# Patient Record
Sex: MALE | Race: WHITE | NOT HISPANIC OR LATINO | Employment: OTHER | URBAN - METROPOLITAN AREA
[De-identification: names, ages, dates, MRNs, and addresses within clinical notes are randomized per-mention and may not be internally consistent; named-entity substitution may affect disease eponyms.]

---

## 2020-08-07 ENCOUNTER — LAB REQUISITION (OUTPATIENT)
Dept: LAB | Facility: HOSPITAL | Age: 70
End: 2020-08-07

## 2020-08-07 DIAGNOSIS — Z00.00 ENCOUNTER FOR GENERAL ADULT MEDICAL EXAMINATION WITHOUT ABNORMAL FINDINGS: ICD-10-CM

## 2020-08-07 LAB
ANION GAP SERPL CALCULATED.3IONS-SCNC: 12 MMOL/L (ref 5–15)
BUN SERPL-MCNC: 10 MG/DL (ref 8–23)
BUN SERPL-MCNC: NORMAL MG/DL
BUN/CREAT SERPL: NORMAL
CALCIUM SPEC-SCNC: 8.7 MG/DL (ref 8.6–10.5)
CHLORIDE SERPL-SCNC: 103 MMOL/L (ref 98–107)
CO2 SERPL-SCNC: 23 MMOL/L (ref 22–29)
CREAT SERPL-MCNC: 0.95 MG/DL (ref 0.76–1.27)
DEPRECATED RDW RBC AUTO: 45.1 FL (ref 37–54)
ERYTHROCYTE [DISTWIDTH] IN BLOOD BY AUTOMATED COUNT: 14.2 % (ref 12.3–15.4)
GFR SERPL CREATININE-BSD FRML MDRD: 78 ML/MIN/1.73
GLUCOSE SERPL-MCNC: 86 MG/DL (ref 65–99)
HCT VFR BLD AUTO: 35 % (ref 37.5–51)
HGB BLD-MCNC: 11.6 G/DL (ref 13–17.7)
MAGNESIUM SERPL-MCNC: 1.9 MG/DL (ref 1.6–2.4)
MCH RBC QN AUTO: 30.5 PG (ref 26.6–33)
MCHC RBC AUTO-ENTMCNC: 33.1 G/DL (ref 31.5–35.7)
MCV RBC AUTO: 92 FL (ref 79–97)
PHOSPHATE SERPL-MCNC: 3 MG/DL (ref 2.5–4.5)
PLATELET # BLD AUTO: 243 10*3/MM3 (ref 140–450)
PMV BLD AUTO: 9.6 FL (ref 6–12)
POTASSIUM SERPL-SCNC: 3.7 MMOL/L (ref 3.5–5.2)
RBC # BLD AUTO: 3.8 10*6/MM3 (ref 4.14–5.8)
SODIUM SERPL-SCNC: 138 MMOL/L (ref 136–145)
WBC # BLD AUTO: 6.7 10*3/MM3 (ref 3.4–10.8)

## 2020-08-07 PROCEDURE — 83735 ASSAY OF MAGNESIUM: CPT

## 2020-08-07 PROCEDURE — 84100 ASSAY OF PHOSPHORUS: CPT

## 2020-08-07 PROCEDURE — 80048 BASIC METABOLIC PNL TOTAL CA: CPT

## 2020-08-07 PROCEDURE — 85027 COMPLETE CBC AUTOMATED: CPT

## 2020-08-10 ENCOUNTER — LAB REQUISITION (OUTPATIENT)
Dept: LAB | Facility: HOSPITAL | Age: 70
End: 2020-08-10

## 2020-08-10 DIAGNOSIS — Z00.00 ENCOUNTER FOR GENERAL ADULT MEDICAL EXAMINATION WITHOUT ABNORMAL FINDINGS: ICD-10-CM

## 2020-08-10 LAB
ANION GAP SERPL CALCULATED.3IONS-SCNC: 10 MMOL/L (ref 5–15)
BUN SERPL-MCNC: 10 MG/DL (ref 8–23)
BUN SERPL-MCNC: NORMAL MG/DL
BUN/CREAT SERPL: NORMAL
CALCIUM SPEC-SCNC: 9.5 MG/DL (ref 8.6–10.5)
CHLORIDE SERPL-SCNC: 103 MMOL/L (ref 98–107)
CK SERPL-CCNC: 91 U/L (ref 20–200)
CO2 SERPL-SCNC: 25 MMOL/L (ref 22–29)
CREAT SERPL-MCNC: 1.04 MG/DL (ref 0.76–1.27)
DEPRECATED RDW RBC AUTO: 46.4 FL (ref 37–54)
ERYTHROCYTE [DISTWIDTH] IN BLOOD BY AUTOMATED COUNT: 14.4 % (ref 12.3–15.4)
GFR SERPL CREATININE-BSD FRML MDRD: 70 ML/MIN/1.73
GLUCOSE SERPL-MCNC: 81 MG/DL (ref 65–99)
HCT VFR BLD AUTO: 33.8 % (ref 37.5–51)
HGB BLD-MCNC: 11.4 G/DL (ref 13–17.7)
MCH RBC QN AUTO: 30.7 PG (ref 26.6–33)
MCHC RBC AUTO-ENTMCNC: 33.6 G/DL (ref 31.5–35.7)
MCV RBC AUTO: 91.5 FL (ref 79–97)
PLATELET # BLD AUTO: 257 10*3/MM3 (ref 140–450)
PMV BLD AUTO: 9.4 FL (ref 6–12)
POTASSIUM SERPL-SCNC: 4.3 MMOL/L (ref 3.5–5.2)
RBC # BLD AUTO: 3.7 10*6/MM3 (ref 4.14–5.8)
SODIUM SERPL-SCNC: 138 MMOL/L (ref 136–145)
WBC # BLD AUTO: 7.1 10*3/MM3 (ref 3.4–10.8)

## 2020-08-10 PROCEDURE — 80048 BASIC METABOLIC PNL TOTAL CA: CPT | Performed by: PHYSICAL MEDICINE & REHABILITATION

## 2020-08-10 PROCEDURE — 85027 COMPLETE CBC AUTOMATED: CPT | Performed by: PHYSICAL MEDICINE & REHABILITATION

## 2020-08-10 PROCEDURE — 82550 ASSAY OF CK (CPK): CPT | Performed by: PHYSICAL MEDICINE & REHABILITATION

## 2020-08-14 ENCOUNTER — LAB REQUISITION (OUTPATIENT)
Dept: LAB | Facility: HOSPITAL | Age: 70
End: 2020-08-14

## 2020-08-14 ENCOUNTER — HOSPITAL ENCOUNTER (OUTPATIENT)
Dept: GENERAL RADIOLOGY | Facility: HOSPITAL | Age: 70
Discharge: HOME | End: 2020-08-14

## 2020-08-14 DIAGNOSIS — Z00.00 ENCOUNTER FOR GENERAL ADULT MEDICAL EXAMINATION WITHOUT ABNORMAL FINDINGS: ICD-10-CM

## 2020-08-14 DIAGNOSIS — U07.1 COVID-19 VIRUS INFECTION: ICD-10-CM

## 2020-08-14 DIAGNOSIS — M62.82 RHABDOMYOLYSIS: ICD-10-CM

## 2020-08-14 LAB
ANION GAP SERPL CALCULATED.3IONS-SCNC: 12 MMOL/L (ref 5–15)
BACTERIA UR QL AUTO: ABNORMAL /HPF
BASOPHILS # BLD AUTO: 0.1 10*3/MM3 (ref 0–0.2)
BASOPHILS NFR BLD AUTO: 0.7 % (ref 0–1.5)
BILIRUB UR QL STRIP: NEGATIVE
BUN SERPL-MCNC: 13 MG/DL (ref 8–23)
BUN SERPL-MCNC: ABNORMAL MG/DL
BUN/CREAT SERPL: ABNORMAL
CALCIUM SPEC-SCNC: 9.3 MG/DL (ref 8.6–10.5)
CHLORIDE SERPL-SCNC: 101 MMOL/L (ref 98–107)
CLARITY UR: CLEAR
CO2 SERPL-SCNC: 21 MMOL/L (ref 22–29)
COLOR UR: YELLOW
CREAT SERPL-MCNC: 1.27 MG/DL (ref 0.76–1.27)
DEPRECATED RDW RBC AUTO: 46.4 FL (ref 37–54)
EOSINOPHIL # BLD AUTO: 0 10*3/MM3 (ref 0–0.4)
EOSINOPHIL NFR BLD AUTO: 0.2 % (ref 0.3–6.2)
ERYTHROCYTE [DISTWIDTH] IN BLOOD BY AUTOMATED COUNT: 14.6 % (ref 12.3–15.4)
GFR SERPL CREATININE-BSD FRML MDRD: 56 ML/MIN/1.73
GLUCOSE SERPL-MCNC: 83 MG/DL (ref 65–99)
GLUCOSE UR STRIP-MCNC: NEGATIVE MG/DL
HCT VFR BLD AUTO: 31.9 % (ref 37.5–51)
HGB BLD-MCNC: 10.9 G/DL (ref 13–17.7)
HGB UR QL STRIP.AUTO: ABNORMAL
HYALINE CASTS UR QL AUTO: ABNORMAL /LPF
KETONES UR QL STRIP: ABNORMAL
LEUKOCYTE ESTERASE UR QL STRIP.AUTO: ABNORMAL
LYMPHOCYTES # BLD AUTO: 2 10*3/MM3 (ref 0.7–3.1)
LYMPHOCYTES NFR BLD AUTO: 23.2 % (ref 19.6–45.3)
MCH RBC QN AUTO: 31.3 PG (ref 26.6–33)
MCHC RBC AUTO-ENTMCNC: 34.2 G/DL (ref 31.5–35.7)
MCV RBC AUTO: 91.7 FL (ref 79–97)
MONOCYTES # BLD AUTO: 0.9 10*3/MM3 (ref 0.1–0.9)
MONOCYTES NFR BLD AUTO: 10.6 % (ref 5–12)
NEUTROPHILS NFR BLD AUTO: 5.7 10*3/MM3 (ref 1.7–7)
NEUTROPHILS NFR BLD AUTO: 65.3 % (ref 42.7–76)
NITRITE UR QL STRIP: POSITIVE
NRBC BLD AUTO-RTO: 0.1 /100 WBC (ref 0–0.2)
PH UR STRIP.AUTO: 5.5 [PH] (ref 5–8)
PLATELET # BLD AUTO: 256 10*3/MM3 (ref 140–450)
PMV BLD AUTO: 9.6 FL (ref 6–12)
POTASSIUM SERPL-SCNC: 3.9 MMOL/L (ref 3.5–5.2)
PROT UR QL STRIP: ABNORMAL
RBC # BLD AUTO: 3.48 10*6/MM3 (ref 4.14–5.8)
RBC # UR: ABNORMAL /HPF
REF LAB TEST METHOD: ABNORMAL
SARS-COV-2 RNA PNL SPEC NAA+PROBE: DETECTED
SODIUM SERPL-SCNC: 134 MMOL/L (ref 136–145)
SP GR UR STRIP: 1.02 (ref 1–1.03)
SQUAMOUS #/AREA URNS HPF: ABNORMAL /HPF
UROBILINOGEN UR QL STRIP: ABNORMAL
WBC # BLD AUTO: 8.7 10*3/MM3 (ref 3.4–10.8)
WBC UR QL AUTO: ABNORMAL /HPF

## 2020-08-14 PROCEDURE — 85025 COMPLETE CBC W/AUTO DIFF WBC: CPT | Performed by: PHYSICAL MEDICINE & REHABILITATION

## 2020-08-14 PROCEDURE — 80048 BASIC METABOLIC PNL TOTAL CA: CPT | Performed by: PHYSICAL MEDICINE & REHABILITATION

## 2020-08-14 PROCEDURE — 71046 X-RAY EXAM CHEST 2 VIEWS: CPT

## 2020-08-14 PROCEDURE — 87635 SARS-COV-2 COVID-19 AMP PRB: CPT | Performed by: PHYSICAL MEDICINE & REHABILITATION

## 2020-08-14 PROCEDURE — 87086 URINE CULTURE/COLONY COUNT: CPT | Performed by: PHYSICAL MEDICINE & REHABILITATION

## 2020-08-14 PROCEDURE — 87147 CULTURE TYPE IMMUNOLOGIC: CPT | Performed by: PHYSICAL MEDICINE & REHABILITATION

## 2020-08-14 PROCEDURE — 81001 URINALYSIS AUTO W/SCOPE: CPT | Performed by: PHYSICAL MEDICINE & REHABILITATION

## 2020-08-14 PROCEDURE — 87186 SC STD MICRODIL/AGAR DIL: CPT | Performed by: PHYSICAL MEDICINE & REHABILITATION

## 2020-08-14 PROCEDURE — U0004 COV-19 TEST NON-CDC HGH THRU: HCPCS | Performed by: PHYSICAL MEDICINE & REHABILITATION

## 2020-08-14 PROCEDURE — U0002 COVID-19 LAB TEST NON-CDC: HCPCS | Performed by: PHYSICAL MEDICINE & REHABILITATION

## 2020-08-15 ENCOUNTER — LAB REQUISITION (OUTPATIENT)
Dept: LAB | Facility: HOSPITAL | Age: 70
End: 2020-08-15

## 2020-08-15 DIAGNOSIS — M62.82 RHABDOMYOLYSIS: ICD-10-CM

## 2020-08-15 DIAGNOSIS — Z00.00 ENCOUNTER FOR GENERAL ADULT MEDICAL EXAMINATION WITHOUT ABNORMAL FINDINGS: ICD-10-CM

## 2020-08-15 DIAGNOSIS — I69.354 HEMIPLEGIA AND HEMIPARESIS FOLLOWING CEREBRAL INFARCTION AFFECTING LEFT NON-DOMINANT SIDE (HCC): ICD-10-CM

## 2020-08-15 DIAGNOSIS — E78.5 HYPERLIPIDEMIA, UNSPECIFIED: ICD-10-CM

## 2020-08-15 DIAGNOSIS — I10 ESSENTIAL (PRIMARY) HYPERTENSION: ICD-10-CM

## 2020-08-15 LAB
ANION GAP SERPL CALCULATED.3IONS-SCNC: 13 MMOL/L (ref 5–15)
BUN SERPL-MCNC: 13 MG/DL (ref 8–23)
BUN SERPL-MCNC: ABNORMAL MG/DL
BUN/CREAT SERPL: ABNORMAL
CALCIUM SPEC-SCNC: 9.2 MG/DL (ref 8.6–10.5)
CHLORIDE SERPL-SCNC: 104 MMOL/L (ref 98–107)
CO2 SERPL-SCNC: 21 MMOL/L (ref 22–29)
CREAT SERPL-MCNC: 1.06 MG/DL (ref 0.76–1.27)
DEPRECATED RDW RBC AUTO: 47.7 FL (ref 37–54)
ERYTHROCYTE [DISTWIDTH] IN BLOOD BY AUTOMATED COUNT: 14.7 % (ref 12.3–15.4)
GFR SERPL CREATININE-BSD FRML MDRD: 69 ML/MIN/1.73
GLUCOSE SERPL-MCNC: 83 MG/DL (ref 65–99)
HCT VFR BLD AUTO: 33.3 % (ref 37.5–51)
HGB BLD-MCNC: 11.1 G/DL (ref 13–17.7)
MCH RBC QN AUTO: 30.8 PG (ref 26.6–33)
MCHC RBC AUTO-ENTMCNC: 33.4 G/DL (ref 31.5–35.7)
MCV RBC AUTO: 92.1 FL (ref 79–97)
PLATELET # BLD AUTO: 261 10*3/MM3 (ref 140–450)
PMV BLD AUTO: 9.3 FL (ref 6–12)
POTASSIUM SERPL-SCNC: 4.2 MMOL/L (ref 3.5–5.2)
RBC # BLD AUTO: 3.61 10*6/MM3 (ref 4.14–5.8)
REF LAB TEST METHOD: NORMAL
SARS-COV-2 RNA RESP QL NAA+PROBE: NOT DETECTED
SODIUM SERPL-SCNC: 138 MMOL/L (ref 136–145)
WBC # BLD AUTO: 5.9 10*3/MM3 (ref 3.4–10.8)

## 2020-08-15 PROCEDURE — U0003 INFECTIOUS AGENT DETECTION BY NUCLEIC ACID (DNA OR RNA); SEVERE ACUTE RESPIRATORY SYNDROME CORONAVIRUS 2 (SARS-COV-2) (CORONAVIRUS DISEASE [COVID-19]), AMPLIFIED PROBE TECHNIQUE, MAKING USE OF HIGH THROUGHPUT TECHNOLOGIES AS DESCRIBED BY CMS-2020-01-R: HCPCS | Performed by: PHYSICAL MEDICINE & REHABILITATION

## 2020-08-15 PROCEDURE — 85027 COMPLETE CBC AUTOMATED: CPT | Performed by: PHYSICAL MEDICINE & REHABILITATION

## 2020-08-15 PROCEDURE — 80048 BASIC METABOLIC PNL TOTAL CA: CPT | Performed by: PHYSICAL MEDICINE & REHABILITATION

## 2020-08-17 ENCOUNTER — LAB REQUISITION (OUTPATIENT)
Dept: LAB | Facility: HOSPITAL | Age: 70
End: 2020-08-17

## 2020-08-17 DIAGNOSIS — Z00.00 ENCOUNTER FOR GENERAL ADULT MEDICAL EXAMINATION WITHOUT ABNORMAL FINDINGS: ICD-10-CM

## 2020-08-17 LAB
ANION GAP SERPL CALCULATED.3IONS-SCNC: 12 MMOL/L (ref 5–15)
BACTERIA SPEC AEROBE CULT: ABNORMAL
BUN SERPL-MCNC: 11 MG/DL (ref 8–23)
BUN SERPL-MCNC: NORMAL MG/DL
BUN/CREAT SERPL: NORMAL
CALCIUM SPEC-SCNC: 9.8 MG/DL (ref 8.6–10.5)
CHLORIDE SERPL-SCNC: 101 MMOL/L (ref 98–107)
CO2 SERPL-SCNC: 23 MMOL/L (ref 22–29)
CREAT SERPL-MCNC: 0.99 MG/DL (ref 0.76–1.27)
DEPRECATED RDW RBC AUTO: 46.8 FL (ref 37–54)
ERYTHROCYTE [DISTWIDTH] IN BLOOD BY AUTOMATED COUNT: 14.5 % (ref 12.3–15.4)
GFR SERPL CREATININE-BSD FRML MDRD: 75 ML/MIN/1.73
GLUCOSE SERPL-MCNC: 76 MG/DL (ref 65–99)
HCT VFR BLD AUTO: 37.4 % (ref 37.5–51)
HGB BLD-MCNC: 12.6 G/DL (ref 13–17.7)
MCH RBC QN AUTO: 31.5 PG (ref 26.6–33)
MCHC RBC AUTO-ENTMCNC: 33.8 G/DL (ref 31.5–35.7)
MCV RBC AUTO: 93.1 FL (ref 79–97)
PLATELET # BLD AUTO: 311 10*3/MM3 (ref 140–450)
PMV BLD AUTO: 9.7 FL (ref 6–12)
POTASSIUM SERPL-SCNC: 4.5 MMOL/L (ref 3.5–5.2)
RBC # BLD AUTO: 4.02 10*6/MM3 (ref 4.14–5.8)
SODIUM SERPL-SCNC: 136 MMOL/L (ref 136–145)
WBC # BLD AUTO: 4.8 10*3/MM3 (ref 3.4–10.8)

## 2020-08-17 PROCEDURE — 85027 COMPLETE CBC AUTOMATED: CPT | Performed by: PHYSICAL MEDICINE & REHABILITATION

## 2020-08-17 PROCEDURE — 80048 BASIC METABOLIC PNL TOTAL CA: CPT | Performed by: PHYSICAL MEDICINE & REHABILITATION

## 2020-08-18 LAB — SARS-COV-2 RNA RESP QL NAA+PROBE: NOT DETECTED

## 2020-08-21 ENCOUNTER — LAB REQUISITION (OUTPATIENT)
Dept: LAB | Facility: HOSPITAL | Age: 70
End: 2020-08-21

## 2020-08-21 DIAGNOSIS — Z00.00 ENCOUNTER FOR GENERAL ADULT MEDICAL EXAMINATION WITHOUT ABNORMAL FINDINGS: ICD-10-CM

## 2020-08-21 LAB
ANION GAP SERPL CALCULATED.3IONS-SCNC: 11 MMOL/L (ref 5–15)
BASOPHILS # BLD AUTO: 0 10*3/MM3 (ref 0–0.2)
BASOPHILS NFR BLD AUTO: 0.3 % (ref 0–1.5)
BUN SERPL-MCNC: 23 MG/DL (ref 8–23)
BUN SERPL-MCNC: ABNORMAL MG/DL
BUN/CREAT SERPL: ABNORMAL
CALCIUM SPEC-SCNC: 9.2 MG/DL (ref 8.6–10.5)
CHLORIDE SERPL-SCNC: 104 MMOL/L (ref 98–107)
CO2 SERPL-SCNC: 21 MMOL/L (ref 22–29)
CREAT SERPL-MCNC: 1.23 MG/DL (ref 0.76–1.27)
DEPRECATED RDW RBC AUTO: 46.4 FL (ref 37–54)
EOSINOPHIL # BLD AUTO: 0 10*3/MM3 (ref 0–0.4)
EOSINOPHIL NFR BLD AUTO: 0.3 % (ref 0.3–6.2)
ERYTHROCYTE [DISTWIDTH] IN BLOOD BY AUTOMATED COUNT: 14.5 % (ref 12.3–15.4)
GFR SERPL CREATININE-BSD FRML MDRD: 58 ML/MIN/1.73
GLUCOSE SERPL-MCNC: 87 MG/DL (ref 65–99)
HCT VFR BLD AUTO: 33.5 % (ref 37.5–51)
HGB BLD-MCNC: 11.3 G/DL (ref 13–17.7)
LYMPHOCYTES # BLD AUTO: 1.4 10*3/MM3 (ref 0.7–3.1)
LYMPHOCYTES NFR BLD AUTO: 30 % (ref 19.6–45.3)
MCH RBC QN AUTO: 31 PG (ref 26.6–33)
MCHC RBC AUTO-ENTMCNC: 33.7 G/DL (ref 31.5–35.7)
MCV RBC AUTO: 91.7 FL (ref 79–97)
MONOCYTES # BLD AUTO: 0.6 10*3/MM3 (ref 0.1–0.9)
MONOCYTES NFR BLD AUTO: 12.7 % (ref 5–12)
NEUTROPHILS NFR BLD AUTO: 2.6 10*3/MM3 (ref 1.7–7)
NEUTROPHILS NFR BLD AUTO: 56.7 % (ref 42.7–76)
NRBC BLD AUTO-RTO: 0.2 /100 WBC (ref 0–0.2)
PLATELET # BLD AUTO: 260 10*3/MM3 (ref 140–450)
PMV BLD AUTO: 9.5 FL (ref 6–12)
POTASSIUM SERPL-SCNC: 4.6 MMOL/L (ref 3.5–5.2)
RBC # BLD AUTO: 3.65 10*6/MM3 (ref 4.14–5.8)
SODIUM SERPL-SCNC: 136 MMOL/L (ref 136–145)
WBC # BLD AUTO: 4.7 10*3/MM3 (ref 3.4–10.8)

## 2020-08-21 PROCEDURE — 85025 COMPLETE CBC W/AUTO DIFF WBC: CPT

## 2020-08-21 PROCEDURE — 80048 BASIC METABOLIC PNL TOTAL CA: CPT

## 2020-08-23 ENCOUNTER — APPOINTMENT (OUTPATIENT)
Dept: GENERAL RADIOLOGY | Facility: HOSPITAL | Age: 70
End: 2020-08-23

## 2020-08-23 ENCOUNTER — HOSPITAL ENCOUNTER (INPATIENT)
Facility: HOSPITAL | Age: 70
LOS: 18 days | Discharge: ANOTHER HOSPITAL | End: 2020-09-11
Attending: EMERGENCY MEDICINE | Admitting: INTERNAL MEDICINE

## 2020-08-23 DIAGNOSIS — R53.1 WEAKNESS: ICD-10-CM

## 2020-08-23 DIAGNOSIS — R41.0 DELIRIUM: Primary | ICD-10-CM

## 2020-08-23 DIAGNOSIS — J12.82 PNEUMONIA DUE TO COVID-19 VIRUS: ICD-10-CM

## 2020-08-23 DIAGNOSIS — U07.1 PNEUMONIA DUE TO COVID-19 VIRUS: ICD-10-CM

## 2020-08-23 LAB
ALBUMIN SERPL-MCNC: 3.3 G/DL (ref 3.5–5.2)
ALBUMIN/GLOB SERPL: 0.8 G/DL
ALP SERPL-CCNC: 93 U/L (ref 39–117)
ALT SERPL W P-5'-P-CCNC: 45 U/L (ref 1–41)
ANION GAP SERPL CALCULATED.3IONS-SCNC: 17 MMOL/L (ref 5–15)
AST SERPL-CCNC: 52 U/L (ref 1–40)
BACTERIA UR QL AUTO: ABNORMAL /HPF
BASOPHILS # BLD AUTO: 0 10*3/MM3 (ref 0–0.2)
BASOPHILS NFR BLD AUTO: 0.3 % (ref 0–1.5)
BILIRUB SERPL-MCNC: 0.4 MG/DL (ref 0–1.2)
BILIRUB UR QL STRIP: NEGATIVE
BUN SERPL-MCNC: 27 MG/DL (ref 8–23)
BUN SERPL-MCNC: ABNORMAL MG/DL
BUN/CREAT SERPL: ABNORMAL
CALCIUM SPEC-SCNC: 8.9 MG/DL (ref 8.6–10.5)
CHLORIDE SERPL-SCNC: 103 MMOL/L (ref 98–107)
CLARITY UR: CLEAR
CO2 SERPL-SCNC: 21 MMOL/L (ref 22–29)
COLOR UR: YELLOW
CREAT SERPL-MCNC: 1.26 MG/DL (ref 0.76–1.27)
D-LACTATE SERPL-SCNC: 1.1 MMOL/L (ref 0.5–2)
DEPRECATED RDW RBC AUTO: 46.4 FL (ref 37–54)
EOSINOPHIL # BLD AUTO: 0 10*3/MM3 (ref 0–0.4)
EOSINOPHIL NFR BLD AUTO: 0 % (ref 0.3–6.2)
ERYTHROCYTE [DISTWIDTH] IN BLOOD BY AUTOMATED COUNT: 14.6 % (ref 12.3–15.4)
GFR SERPL CREATININE-BSD FRML MDRD: 56 ML/MIN/1.73
GLOBULIN UR ELPH-MCNC: 3.9 GM/DL
GLUCOSE SERPL-MCNC: 102 MG/DL (ref 65–99)
GLUCOSE UR STRIP-MCNC: NEGATIVE MG/DL
GRAN CASTS URNS QL MICRO: ABNORMAL /LPF
HCT VFR BLD AUTO: 34.9 % (ref 37.5–51)
HGB BLD-MCNC: 11.8 G/DL (ref 13–17.7)
HGB UR QL STRIP.AUTO: NEGATIVE
HYALINE CASTS UR QL AUTO: ABNORMAL /LPF
KETONES UR QL STRIP: ABNORMAL
LEUKOCYTE ESTERASE UR QL STRIP.AUTO: ABNORMAL
LYMPHOCYTES # BLD AUTO: 1.2 10*3/MM3 (ref 0.7–3.1)
LYMPHOCYTES NFR BLD AUTO: 19.6 % (ref 19.6–45.3)
MCH RBC QN AUTO: 31 PG (ref 26.6–33)
MCHC RBC AUTO-ENTMCNC: 33.7 G/DL (ref 31.5–35.7)
MCV RBC AUTO: 92.2 FL (ref 79–97)
MONOCYTES # BLD AUTO: 0.6 10*3/MM3 (ref 0.1–0.9)
MONOCYTES NFR BLD AUTO: 10.1 % (ref 5–12)
NEUTROPHILS NFR BLD AUTO: 4.4 10*3/MM3 (ref 1.7–7)
NEUTROPHILS NFR BLD AUTO: 70 % (ref 42.7–76)
NITRITE UR QL STRIP: NEGATIVE
NRBC BLD AUTO-RTO: 0.1 /100 WBC (ref 0–0.2)
PH UR STRIP.AUTO: 5.5 [PH] (ref 5–8)
PLATELET # BLD AUTO: 268 10*3/MM3 (ref 140–450)
PMV BLD AUTO: 8.6 FL (ref 6–12)
POTASSIUM SERPL-SCNC: 4.6 MMOL/L (ref 3.5–5.2)
PROT SERPL-MCNC: 7.2 G/DL (ref 6–8.5)
PROT UR QL STRIP: ABNORMAL
RBC # BLD AUTO: 3.79 10*6/MM3 (ref 4.14–5.8)
RBC # UR: ABNORMAL /HPF
REF LAB TEST METHOD: ABNORMAL
SODIUM SERPL-SCNC: 141 MMOL/L (ref 136–145)
SP GR UR STRIP: 1.02 (ref 1–1.03)
SQUAMOUS #/AREA URNS HPF: ABNORMAL /HPF
TROPONIN T SERPL-MCNC: 0.04 NG/ML (ref 0–0.03)
UROBILINOGEN UR QL STRIP: ABNORMAL
WBC # BLD AUTO: 6.3 10*3/MM3 (ref 3.4–10.8)
WBC UR QL AUTO: ABNORMAL /HPF

## 2020-08-23 PROCEDURE — 87086 URINE CULTURE/COLONY COUNT: CPT | Performed by: EMERGENCY MEDICINE

## 2020-08-23 PROCEDURE — P9612 CATHETERIZE FOR URINE SPEC: HCPCS

## 2020-08-23 PROCEDURE — 87088 URINE BACTERIA CULTURE: CPT | Performed by: EMERGENCY MEDICINE

## 2020-08-23 PROCEDURE — 0202U NFCT DS 22 TRGT SARS-COV-2: CPT | Performed by: INTERNAL MEDICINE

## 2020-08-23 PROCEDURE — 85025 COMPLETE CBC W/AUTO DIFF WBC: CPT | Performed by: EMERGENCY MEDICINE

## 2020-08-23 PROCEDURE — 84484 ASSAY OF TROPONIN QUANT: CPT | Performed by: EMERGENCY MEDICINE

## 2020-08-23 PROCEDURE — 99285 EMERGENCY DEPT VISIT HI MDM: CPT

## 2020-08-23 PROCEDURE — 93005 ELECTROCARDIOGRAM TRACING: CPT | Performed by: EMERGENCY MEDICINE

## 2020-08-23 PROCEDURE — 71045 X-RAY EXAM CHEST 1 VIEW: CPT

## 2020-08-23 PROCEDURE — 25010000002 CEFEPIME PER 500 MG: Performed by: NURSE PRACTITIONER

## 2020-08-23 PROCEDURE — 87147 CULTURE TYPE IMMUNOLOGIC: CPT | Performed by: EMERGENCY MEDICINE

## 2020-08-23 PROCEDURE — 25010000002 HYDROCORTISONE SODIUM SUCCINATE 100 MG RECONSTITUTED SOLUTION: Performed by: EMERGENCY MEDICINE

## 2020-08-23 PROCEDURE — 81001 URINALYSIS AUTO W/SCOPE: CPT | Performed by: EMERGENCY MEDICINE

## 2020-08-23 PROCEDURE — 25010000002 CEFEPIME PER 500 MG: Performed by: EMERGENCY MEDICINE

## 2020-08-23 PROCEDURE — 80053 COMPREHEN METABOLIC PANEL: CPT | Performed by: EMERGENCY MEDICINE

## 2020-08-23 PROCEDURE — 25010000002 ENOXAPARIN PER 10 MG: Performed by: NURSE PRACTITIONER

## 2020-08-23 PROCEDURE — 87040 BLOOD CULTURE FOR BACTERIA: CPT | Performed by: EMERGENCY MEDICINE

## 2020-08-23 PROCEDURE — 99220 PR INITIAL OBSERVATION CARE/DAY 70 MINUTES: CPT | Performed by: INTERNAL MEDICINE

## 2020-08-23 PROCEDURE — G0378 HOSPITAL OBSERVATION PER HR: HCPCS

## 2020-08-23 PROCEDURE — 83605 ASSAY OF LACTIC ACID: CPT

## 2020-08-23 RX ORDER — MULTIPLE VITAMINS W/ MINERALS TAB 9MG-400MCG
1 TAB ORAL DAILY
COMMUNITY

## 2020-08-23 RX ORDER — TAMSULOSIN HYDROCHLORIDE 0.4 MG/1
1 CAPSULE ORAL DAILY
COMMUNITY

## 2020-08-23 RX ORDER — CETIRIZINE HYDROCHLORIDE 5 MG/1
5 TABLET ORAL DAILY
Status: ON HOLD | COMMUNITY
End: 2022-01-01

## 2020-08-23 RX ORDER — ONDANSETRON 2 MG/ML
4 INJECTION INTRAMUSCULAR; INTRAVENOUS EVERY 6 HOURS PRN
Status: DISCONTINUED | OUTPATIENT
Start: 2020-08-23 | End: 2020-09-11 | Stop reason: HOSPADM

## 2020-08-23 RX ORDER — ACETAMINOPHEN 650 MG/1
650 SUPPOSITORY RECTAL EVERY 4 HOURS PRN
Status: DISCONTINUED | OUTPATIENT
Start: 2020-08-23 | End: 2020-09-11 | Stop reason: HOSPADM

## 2020-08-23 RX ORDER — ROSUVASTATIN CALCIUM 10 MG/1
20 TABLET, COATED ORAL DAILY
Status: DISCONTINUED | OUTPATIENT
Start: 2020-08-23 | End: 2020-09-11 | Stop reason: HOSPADM

## 2020-08-23 RX ORDER — NYSTATIN 100000 U/G
100000 CREAM TOPICAL 2 TIMES DAILY
Status: ON HOLD | COMMUNITY
End: 2022-01-01

## 2020-08-23 RX ORDER — BENZONATATE 100 MG/1
100 CAPSULE ORAL 3 TIMES DAILY PRN
Status: DISCONTINUED | OUTPATIENT
Start: 2020-08-23 | End: 2020-09-11 | Stop reason: HOSPADM

## 2020-08-23 RX ORDER — SODIUM CHLORIDE 0.9 % (FLUSH) 0.9 %
10 SYRINGE (ML) INJECTION AS NEEDED
Status: DISCONTINUED | OUTPATIENT
Start: 2020-08-23 | End: 2020-09-11 | Stop reason: HOSPADM

## 2020-08-23 RX ORDER — SODIUM CHLORIDE 0.9 % (FLUSH) 0.9 %
10 SYRINGE (ML) INJECTION EVERY 12 HOURS SCHEDULED
Status: DISCONTINUED | OUTPATIENT
Start: 2020-08-23 | End: 2020-09-11 | Stop reason: HOSPADM

## 2020-08-23 RX ORDER — SODIUM CHLORIDE 9 MG/ML
100 INJECTION, SOLUTION INTRAVENOUS CONTINUOUS
Status: DISCONTINUED | OUTPATIENT
Start: 2020-08-23 | End: 2020-09-07

## 2020-08-23 RX ORDER — TAMSULOSIN HYDROCHLORIDE 0.4 MG/1
0.8 CAPSULE ORAL DAILY
Status: DISCONTINUED | OUTPATIENT
Start: 2020-08-23 | End: 2020-09-11 | Stop reason: HOSPADM

## 2020-08-23 RX ORDER — LEVETIRACETAM 500 MG/1
500 TABLET ORAL 2 TIMES DAILY
Status: DISCONTINUED | OUTPATIENT
Start: 2020-08-23 | End: 2020-09-11 | Stop reason: HOSPADM

## 2020-08-23 RX ORDER — CETIRIZINE HYDROCHLORIDE 10 MG/1
5 TABLET ORAL DAILY
Status: DISCONTINUED | OUTPATIENT
Start: 2020-08-23 | End: 2020-09-11 | Stop reason: HOSPADM

## 2020-08-23 RX ORDER — LISINOPRIL 40 MG/1
40 TABLET ORAL DAILY
Status: ON HOLD | COMMUNITY
End: 2022-01-01

## 2020-08-23 RX ORDER — UREA 10 %
LOTION (ML) TOPICAL
COMMUNITY
End: 2020-09-11 | Stop reason: HOSPADM

## 2020-08-23 RX ORDER — ACETAMINOPHEN 325 MG/1
650 TABLET ORAL EVERY 4 HOURS PRN
Status: DISCONTINUED | OUTPATIENT
Start: 2020-08-23 | End: 2020-09-11 | Stop reason: HOSPADM

## 2020-08-23 RX ORDER — CALCIUM CARBONATE 200(500)MG
2 TABLET,CHEWABLE ORAL 3 TIMES DAILY PRN
COMMUNITY

## 2020-08-23 RX ORDER — FLUTICASONE PROPIONATE 50 MCG
2 SPRAY, SUSPENSION (ML) NASAL DAILY
Status: ON HOLD | COMMUNITY
End: 2022-01-01

## 2020-08-23 RX ORDER — FINASTERIDE 5 MG/1
5 TABLET, FILM COATED ORAL DAILY
COMMUNITY

## 2020-08-23 RX ORDER — ACETAMINOPHEN 325 MG/1
650 TABLET ORAL EVERY 4 HOURS PRN
COMMUNITY

## 2020-08-23 RX ORDER — ZINC OXIDE 20 %
1 OINTMENT (GRAM) TOPICAL 3 TIMES DAILY PRN
COMMUNITY

## 2020-08-23 RX ORDER — POTASSIUM CHLORIDE 20 MEQ/1
40 TABLET, EXTENDED RELEASE ORAL EVERY MORNING
COMMUNITY

## 2020-08-23 RX ORDER — CLOPIDOGREL BISULFATE 75 MG/1
75 TABLET ORAL DAILY
COMMUNITY

## 2020-08-23 RX ORDER — HYDRALAZINE HYDROCHLORIDE 50 MG/1
50 TABLET, FILM COATED ORAL 2 TIMES DAILY
COMMUNITY

## 2020-08-23 RX ORDER — ROSUVASTATIN CALCIUM 20 MG/1
20 TABLET, COATED ORAL DAILY
Status: ON HOLD | COMMUNITY
End: 2022-01-01

## 2020-08-23 RX ORDER — FINASTERIDE 5 MG/1
5 TABLET, FILM COATED ORAL DAILY
Status: DISCONTINUED | OUTPATIENT
Start: 2020-08-23 | End: 2020-09-11 | Stop reason: HOSPADM

## 2020-08-23 RX ORDER — HYDRALAZINE HYDROCHLORIDE 25 MG/1
50 TABLET, FILM COATED ORAL 2 TIMES DAILY
Status: DISCONTINUED | OUTPATIENT
Start: 2020-08-23 | End: 2020-09-11 | Stop reason: HOSPADM

## 2020-08-23 RX ORDER — TRAZODONE HYDROCHLORIDE 50 MG/1
50 TABLET ORAL NIGHTLY
Status: ON HOLD | COMMUNITY
End: 2022-01-01

## 2020-08-23 RX ORDER — AZITHROMYCIN 250 MG/1
500 TABLET, FILM COATED ORAL
Status: COMPLETED | OUTPATIENT
Start: 2020-08-23 | End: 2020-08-25

## 2020-08-23 RX ORDER — DIAPER,BRIEF,INFANT-TODD,DISP
EACH MISCELLANEOUS 2 TIMES DAILY
Status: ON HOLD | COMMUNITY
End: 2022-01-01

## 2020-08-23 RX ORDER — LEVETIRACETAM 500 MG/1
500 TABLET ORAL 2 TIMES DAILY
COMMUNITY

## 2020-08-23 RX ORDER — ACETAMINOPHEN 160 MG/5ML
650 SOLUTION ORAL EVERY 4 HOURS PRN
Status: DISCONTINUED | OUTPATIENT
Start: 2020-08-23 | End: 2020-09-11 | Stop reason: HOSPADM

## 2020-08-23 RX ORDER — OXYBUTYNIN CHLORIDE 5 MG/1
15 TABLET ORAL DAILY
Status: ON HOLD | COMMUNITY
End: 2022-01-01

## 2020-08-23 RX ORDER — BACLOFEN 20 MG/1
20 TABLET ORAL 2 TIMES DAILY
Status: ON HOLD | COMMUNITY
End: 2022-01-01

## 2020-08-23 RX ORDER — TRAZODONE HYDROCHLORIDE 50 MG/1
50 TABLET ORAL NIGHTLY
Status: DISCONTINUED | OUTPATIENT
Start: 2020-08-23 | End: 2020-09-11 | Stop reason: HOSPADM

## 2020-08-23 RX ORDER — AMLODIPINE BESYLATE 5 MG/1
10 TABLET ORAL DAILY
Status: DISCONTINUED | OUTPATIENT
Start: 2020-08-23 | End: 2020-09-11 | Stop reason: HOSPADM

## 2020-08-23 RX ORDER — SALIVA STIMULANT COMB. NO.7
GEL (GRAM) MUCOUS MEMBRANE 4 TIMES DAILY PRN
Status: ON HOLD | COMMUNITY
End: 2022-01-01

## 2020-08-23 RX ORDER — ACETAMINOPHEN 500 MG
1000 TABLET ORAL ONCE
Status: COMPLETED | OUTPATIENT
Start: 2020-08-23 | End: 2020-08-23

## 2020-08-23 RX ORDER — ECHINACEA PURPUREA EXTRACT 125 MG
2 TABLET ORAL AS NEEDED
Status: ON HOLD | COMMUNITY
End: 2022-01-01

## 2020-08-23 RX ORDER — ONDANSETRON 4 MG/1
4 TABLET, FILM COATED ORAL EVERY 6 HOURS PRN
Status: DISCONTINUED | OUTPATIENT
Start: 2020-08-23 | End: 2020-09-11 | Stop reason: HOSPADM

## 2020-08-23 RX ORDER — AMLODIPINE BESYLATE 10 MG/1
10 TABLET ORAL DAILY
COMMUNITY

## 2020-08-23 RX ORDER — GUAIFENESIN 600 MG/1
600 TABLET, EXTENDED RELEASE ORAL 2 TIMES DAILY
COMMUNITY

## 2020-08-23 RX ORDER — CLOPIDOGREL BISULFATE 75 MG/1
75 TABLET ORAL DAILY
Status: DISCONTINUED | OUTPATIENT
Start: 2020-08-23 | End: 2020-09-11 | Stop reason: HOSPADM

## 2020-08-23 RX ORDER — CALCIUM CARBONATE 200(500)MG
2 TABLET,CHEWABLE ORAL 3 TIMES DAILY PRN
Status: DISCONTINUED | OUTPATIENT
Start: 2020-08-23 | End: 2020-09-11 | Stop reason: HOSPADM

## 2020-08-23 RX ADMIN — TAMSULOSIN HYDROCHLORIDE 0.8 MG: 0.4 CAPSULE ORAL at 16:08

## 2020-08-23 RX ADMIN — CETIRIZINE HYDROCHLORIDE 5 MG: 10 TABLET, FILM COATED ORAL at 16:09

## 2020-08-23 RX ADMIN — TRAZODONE HYDROCHLORIDE 50 MG: 50 TABLET ORAL at 19:32

## 2020-08-23 RX ADMIN — Medication 10 ML: at 11:41

## 2020-08-23 RX ADMIN — CEFEPIME HYDROCHLORIDE 2 G: 2 INJECTION, POWDER, FOR SOLUTION INTRAVENOUS at 19:29

## 2020-08-23 RX ADMIN — AMLODIPINE BESYLATE 10 MG: 5 TABLET ORAL at 16:08

## 2020-08-23 RX ADMIN — ACETAMINOPHEN 1000 MG: 500 TABLET, FILM COATED ORAL at 04:07

## 2020-08-23 RX ADMIN — HYDROCORTISONE SODIUM SUCCINATE 100 MG: 100 INJECTION, POWDER, FOR SOLUTION INTRAMUSCULAR; INTRAVENOUS at 04:00

## 2020-08-23 RX ADMIN — FINASTERIDE 5 MG: 5 TABLET, FILM COATED ORAL at 16:09

## 2020-08-23 RX ADMIN — HYDRALAZINE HYDROCHLORIDE 50 MG: 25 TABLET, FILM COATED ORAL at 19:32

## 2020-08-23 RX ADMIN — CEFEPIME HYDROCHLORIDE 2 G: 2 INJECTION, POWDER, FOR SOLUTION INTRAVENOUS at 04:06

## 2020-08-23 RX ADMIN — CLOPIDOGREL BISULFATE 75 MG: 75 TABLET ORAL at 16:08

## 2020-08-23 RX ADMIN — ENOXAPARIN SODIUM 40 MG: 40 INJECTION SUBCUTANEOUS at 16:07

## 2020-08-23 RX ADMIN — ROSUVASTATIN CALCIUM 20 MG: 10 TABLET, FILM COATED ORAL at 16:08

## 2020-08-23 RX ADMIN — Medication 10 ML: at 19:33

## 2020-08-23 RX ADMIN — CALCIUM CARBONATE (ANTACID) CHEW TAB 500 MG 2 TABLET: 500 CHEW TAB at 22:12

## 2020-08-23 RX ADMIN — CEFEPIME HYDROCHLORIDE 2 G: 2 INJECTION, POWDER, FOR SOLUTION INTRAVENOUS at 11:41

## 2020-08-23 RX ADMIN — SODIUM CHLORIDE 100 ML/HR: 900 INJECTION, SOLUTION INTRAVENOUS at 11:41

## 2020-08-23 RX ADMIN — BENZONATATE 100 MG: 100 CAPSULE ORAL at 22:12

## 2020-08-23 RX ADMIN — SODIUM CHLORIDE, SODIUM LACTATE, POTASSIUM CHLORIDE, AND CALCIUM CHLORIDE 2340 ML: 600; 310; 30; 20 INJECTION, SOLUTION INTRAVENOUS at 04:00

## 2020-08-23 RX ADMIN — AZITHROMYCIN MONOHYDRATE 500 MG: 250 TABLET ORAL at 04:07

## 2020-08-23 RX ADMIN — LEVETIRACETAM 500 MG: 500 TABLET ORAL at 19:32

## 2020-08-24 LAB
ALBUMIN SERPL-MCNC: 2.9 G/DL (ref 3.5–5.2)
ALBUMIN/GLOB SERPL: 0.9 G/DL
ALP SERPL-CCNC: 78 U/L (ref 39–117)
ALT SERPL W P-5'-P-CCNC: 46 U/L (ref 1–41)
ANION GAP SERPL CALCULATED.3IONS-SCNC: 10 MMOL/L (ref 5–15)
AST SERPL-CCNC: 52 U/L (ref 1–40)
B PARAPERT DNA SPEC QL NAA+PROBE: NOT DETECTED
B PERT DNA SPEC QL NAA+PROBE: NOT DETECTED
BACTERIA SPEC AEROBE CULT: ABNORMAL
BASOPHILS # BLD AUTO: 0 10*3/MM3 (ref 0–0.2)
BASOPHILS NFR BLD AUTO: 0.2 % (ref 0–1.5)
BILIRUB SERPL-MCNC: 0.3 MG/DL (ref 0–1.2)
BUN SERPL-MCNC: 27 MG/DL (ref 8–23)
BUN SERPL-MCNC: ABNORMAL MG/DL
BUN/CREAT SERPL: ABNORMAL
C PNEUM DNA NPH QL NAA+NON-PROBE: NOT DETECTED
CALCIUM SPEC-SCNC: 8.8 MG/DL (ref 8.6–10.5)
CHLORIDE SERPL-SCNC: 107 MMOL/L (ref 98–107)
CO2 SERPL-SCNC: 23 MMOL/L (ref 22–29)
CREAT SERPL-MCNC: 1.04 MG/DL (ref 0.76–1.27)
D DIMER PPP FEU-MCNC: 0.9 MG/L (FEU) (ref 0–0.59)
DEPRECATED RDW RBC AUTO: 46.8 FL (ref 37–54)
EOSINOPHIL # BLD AUTO: 0 10*3/MM3 (ref 0–0.4)
EOSINOPHIL NFR BLD AUTO: 0.2 % (ref 0.3–6.2)
ERYTHROCYTE [DISTWIDTH] IN BLOOD BY AUTOMATED COUNT: 14.5 % (ref 12.3–15.4)
FLUAV H1 2009 PAND RNA NPH QL NAA+PROBE: NOT DETECTED
FLUAV H1 HA GENE NPH QL NAA+PROBE: NOT DETECTED
FLUAV H3 RNA NPH QL NAA+PROBE: NOT DETECTED
FLUAV SUBTYP SPEC NAA+PROBE: NOT DETECTED
FLUBV RNA ISLT QL NAA+PROBE: NOT DETECTED
GFR SERPL CREATININE-BSD FRML MDRD: 70 ML/MIN/1.73
GLOBULIN UR ELPH-MCNC: 3.3 GM/DL
GLUCOSE SERPL-MCNC: 97 MG/DL (ref 65–99)
HADV DNA SPEC NAA+PROBE: NOT DETECTED
HCOV 229E RNA SPEC QL NAA+PROBE: NOT DETECTED
HCOV HKU1 RNA SPEC QL NAA+PROBE: NOT DETECTED
HCOV NL63 RNA SPEC QL NAA+PROBE: NOT DETECTED
HCOV OC43 RNA SPEC QL NAA+PROBE: NOT DETECTED
HCT VFR BLD AUTO: 30.6 % (ref 37.5–51)
HGB BLD-MCNC: 10.5 G/DL (ref 13–17.7)
HMPV RNA NPH QL NAA+NON-PROBE: NOT DETECTED
HPIV1 RNA SPEC QL NAA+PROBE: NOT DETECTED
HPIV2 RNA SPEC QL NAA+PROBE: NOT DETECTED
HPIV3 RNA NPH QL NAA+PROBE: NOT DETECTED
HPIV4 P GENE NPH QL NAA+PROBE: NOT DETECTED
LYMPHOCYTES # BLD AUTO: 1.2 10*3/MM3 (ref 0.7–3.1)
LYMPHOCYTES NFR BLD AUTO: 16.5 % (ref 19.6–45.3)
M PNEUMO IGG SER IA-ACNC: NOT DETECTED
MAGNESIUM SERPL-MCNC: 1.8 MG/DL (ref 1.6–2.4)
MCH RBC QN AUTO: 31.4 PG (ref 26.6–33)
MCHC RBC AUTO-ENTMCNC: 34.3 G/DL (ref 31.5–35.7)
MCV RBC AUTO: 91.6 FL (ref 79–97)
MONOCYTES # BLD AUTO: 0.5 10*3/MM3 (ref 0.1–0.9)
MONOCYTES NFR BLD AUTO: 7.4 % (ref 5–12)
NEUTROPHILS NFR BLD AUTO: 5.4 10*3/MM3 (ref 1.7–7)
NEUTROPHILS NFR BLD AUTO: 75.7 % (ref 42.7–76)
NRBC BLD AUTO-RTO: 0 /100 WBC (ref 0–0.2)
PLATELET # BLD AUTO: 237 10*3/MM3 (ref 140–450)
PMV BLD AUTO: 8.9 FL (ref 6–12)
POTASSIUM SERPL-SCNC: 3.4 MMOL/L (ref 3.5–5.2)
PROT SERPL-MCNC: 6.2 G/DL (ref 6–8.5)
RBC # BLD AUTO: 3.34 10*6/MM3 (ref 4.14–5.8)
RHINOVIRUS RNA SPEC NAA+PROBE: NOT DETECTED
RSV RNA NPH QL NAA+NON-PROBE: NOT DETECTED
SARS-COV-2 RNA NPH QL NAA+NON-PROBE: DETECTED
SODIUM SERPL-SCNC: 140 MMOL/L (ref 136–145)
TROPONIN T SERPL-MCNC: 0.03 NG/ML (ref 0–0.03)
TROPONIN T SERPL-MCNC: 0.04 NG/ML (ref 0–0.03)
WBC # BLD AUTO: 7.1 10*3/MM3 (ref 3.4–10.8)

## 2020-08-24 PROCEDURE — 25010000002 CEFEPIME PER 500 MG: Performed by: NURSE PRACTITIONER

## 2020-08-24 PROCEDURE — 99233 SBSQ HOSP IP/OBS HIGH 50: CPT | Performed by: INTERNAL MEDICINE

## 2020-08-24 PROCEDURE — 80053 COMPREHEN METABOLIC PANEL: CPT | Performed by: INTERNAL MEDICINE

## 2020-08-24 PROCEDURE — 25010000002 ENOXAPARIN PER 10 MG: Performed by: NURSE PRACTITIONER

## 2020-08-24 PROCEDURE — 85025 COMPLETE CBC W/AUTO DIFF WBC: CPT | Performed by: INTERNAL MEDICINE

## 2020-08-24 PROCEDURE — 83735 ASSAY OF MAGNESIUM: CPT | Performed by: NURSE PRACTITIONER

## 2020-08-24 PROCEDURE — 0202U NFCT DS 22 TRGT SARS-COV-2: CPT | Performed by: INTERNAL MEDICINE

## 2020-08-24 PROCEDURE — 97530 THERAPEUTIC ACTIVITIES: CPT

## 2020-08-24 PROCEDURE — 25010000002 PIPERACILLIN SOD-TAZOBACTAM PER 1 G: Performed by: INTERNAL MEDICINE

## 2020-08-24 PROCEDURE — 84484 ASSAY OF TROPONIN QUANT: CPT | Performed by: INTERNAL MEDICINE

## 2020-08-24 PROCEDURE — 85379 FIBRIN DEGRADATION QUANT: CPT | Performed by: INTERNAL MEDICINE

## 2020-08-24 PROCEDURE — 99222 1ST HOSP IP/OBS MODERATE 55: CPT | Performed by: INTERNAL MEDICINE

## 2020-08-24 PROCEDURE — 97163 PT EVAL HIGH COMPLEX 45 MIN: CPT

## 2020-08-24 RX ORDER — CHOLECALCIFEROL (VITAMIN D3) 125 MCG
5 CAPSULE ORAL NIGHTLY PRN
Status: DISCONTINUED | OUTPATIENT
Start: 2020-08-24 | End: 2020-09-11 | Stop reason: HOSPADM

## 2020-08-24 RX ORDER — POTASSIUM CHLORIDE 20 MEQ/1
40 TABLET, EXTENDED RELEASE ORAL AS NEEDED
Status: DISCONTINUED | OUTPATIENT
Start: 2020-08-24 | End: 2020-09-11 | Stop reason: HOSPADM

## 2020-08-24 RX ORDER — POTASSIUM CHLORIDE 1.5 G/1.77G
40 POWDER, FOR SOLUTION ORAL AS NEEDED
Status: DISCONTINUED | OUTPATIENT
Start: 2020-08-24 | End: 2020-09-11 | Stop reason: HOSPADM

## 2020-08-24 RX ORDER — DEXAMETHASONE 6 MG/1
6 TABLET ORAL
Status: COMPLETED | OUTPATIENT
Start: 2020-08-24 | End: 2020-09-02

## 2020-08-24 RX ADMIN — DEXAMETHASONE 6 MG: 6 TABLET ORAL at 11:26

## 2020-08-24 RX ADMIN — FINASTERIDE 5 MG: 5 TABLET, FILM COATED ORAL at 08:29

## 2020-08-24 RX ADMIN — LEVETIRACETAM 500 MG: 500 TABLET ORAL at 08:29

## 2020-08-24 RX ADMIN — ROSUVASTATIN CALCIUM 20 MG: 10 TABLET, FILM COATED ORAL at 08:28

## 2020-08-24 RX ADMIN — AZITHROMYCIN MONOHYDRATE 500 MG: 250 TABLET ORAL at 08:29

## 2020-08-24 RX ADMIN — TRAZODONE HYDROCHLORIDE 50 MG: 50 TABLET ORAL at 20:15

## 2020-08-24 RX ADMIN — HYDRALAZINE HYDROCHLORIDE 50 MG: 25 TABLET, FILM COATED ORAL at 08:28

## 2020-08-24 RX ADMIN — CETIRIZINE HYDROCHLORIDE 5 MG: 10 TABLET, FILM COATED ORAL at 08:28

## 2020-08-24 RX ADMIN — Medication 5 MG: at 20:15

## 2020-08-24 RX ADMIN — CLOPIDOGREL BISULFATE 75 MG: 75 TABLET ORAL at 08:28

## 2020-08-24 RX ADMIN — Medication 10 ML: at 20:16

## 2020-08-24 RX ADMIN — LEVETIRACETAM 500 MG: 500 TABLET ORAL at 20:15

## 2020-08-24 RX ADMIN — HYDRALAZINE HYDROCHLORIDE 50 MG: 25 TABLET, FILM COATED ORAL at 20:15

## 2020-08-24 RX ADMIN — ACETAMINOPHEN 650 MG: 325 TABLET, FILM COATED ORAL at 03:09

## 2020-08-24 RX ADMIN — AMLODIPINE BESYLATE 10 MG: 5 TABLET ORAL at 08:27

## 2020-08-24 RX ADMIN — ENOXAPARIN SODIUM 40 MG: 40 INJECTION SUBCUTANEOUS at 17:32

## 2020-08-24 RX ADMIN — PIPERACILLIN AND TAZOBACTAM 3.38 G: 3; .375 INJECTION, POWDER, FOR SOLUTION INTRAVENOUS at 11:26

## 2020-08-24 RX ADMIN — PIPERACILLIN AND TAZOBACTAM 3.38 G: 3; .375 INJECTION, POWDER, FOR SOLUTION INTRAVENOUS at 17:32

## 2020-08-24 RX ADMIN — Medication 10 ML: at 08:27

## 2020-08-24 RX ADMIN — TAMSULOSIN HYDROCHLORIDE 0.8 MG: 0.4 CAPSULE ORAL at 08:28

## 2020-08-24 RX ADMIN — CEFEPIME HYDROCHLORIDE 2 G: 2 INJECTION, POWDER, FOR SOLUTION INTRAVENOUS at 03:09

## 2020-08-25 ENCOUNTER — APPOINTMENT (OUTPATIENT)
Dept: GENERAL RADIOLOGY | Facility: HOSPITAL | Age: 70
End: 2020-08-25

## 2020-08-25 LAB
ANION GAP SERPL CALCULATED.3IONS-SCNC: 12 MMOL/L (ref 5–15)
B PARAPERT DNA SPEC QL NAA+PROBE: NOT DETECTED
B PERT DNA SPEC QL NAA+PROBE: NOT DETECTED
BASOPHILS # BLD AUTO: 0 10*3/MM3 (ref 0–0.2)
BASOPHILS NFR BLD AUTO: 0.1 % (ref 0–1.5)
BUN SERPL-MCNC: 25 MG/DL (ref 8–23)
BUN SERPL-MCNC: ABNORMAL MG/DL
BUN/CREAT SERPL: ABNORMAL
C PNEUM DNA NPH QL NAA+NON-PROBE: NOT DETECTED
CALCIUM SPEC-SCNC: 8.5 MG/DL (ref 8.6–10.5)
CHLORIDE SERPL-SCNC: 106 MMOL/L (ref 98–107)
CO2 SERPL-SCNC: 21 MMOL/L (ref 22–29)
CREAT SERPL-MCNC: 1.04 MG/DL (ref 0.76–1.27)
D DIMER PPP FEU-MCNC: 0.73 MG/L (FEU) (ref 0–0.59)
DEPRECATED RDW RBC AUTO: 45.5 FL (ref 37–54)
EOSINOPHIL # BLD AUTO: 0 10*3/MM3 (ref 0–0.4)
EOSINOPHIL NFR BLD AUTO: 0 % (ref 0.3–6.2)
ERYTHROCYTE [DISTWIDTH] IN BLOOD BY AUTOMATED COUNT: 14.3 % (ref 12.3–15.4)
FLUAV H1 2009 PAND RNA NPH QL NAA+PROBE: NOT DETECTED
FLUAV H1 HA GENE NPH QL NAA+PROBE: NOT DETECTED
FLUAV H3 RNA NPH QL NAA+PROBE: NOT DETECTED
FLUAV SUBTYP SPEC NAA+PROBE: NOT DETECTED
FLUBV RNA ISLT QL NAA+PROBE: NOT DETECTED
GFR SERPL CREATININE-BSD FRML MDRD: 70 ML/MIN/1.73
GLUCOSE SERPL-MCNC: 136 MG/DL (ref 65–99)
HADV DNA SPEC NAA+PROBE: NOT DETECTED
HCOV 229E RNA SPEC QL NAA+PROBE: NOT DETECTED
HCOV HKU1 RNA SPEC QL NAA+PROBE: NOT DETECTED
HCOV NL63 RNA SPEC QL NAA+PROBE: NOT DETECTED
HCOV OC43 RNA SPEC QL NAA+PROBE: NOT DETECTED
HCT VFR BLD AUTO: 29.3 % (ref 37.5–51)
HGB BLD-MCNC: 9.9 G/DL (ref 13–17.7)
HMPV RNA NPH QL NAA+NON-PROBE: NOT DETECTED
HPIV1 RNA SPEC QL NAA+PROBE: NOT DETECTED
HPIV2 RNA SPEC QL NAA+PROBE: NOT DETECTED
HPIV3 RNA NPH QL NAA+PROBE: NOT DETECTED
HPIV4 P GENE NPH QL NAA+PROBE: NOT DETECTED
LYMPHOCYTES # BLD AUTO: 0.8 10*3/MM3 (ref 0.7–3.1)
LYMPHOCYTES NFR BLD AUTO: 17.2 % (ref 19.6–45.3)
M PNEUMO IGG SER IA-ACNC: NOT DETECTED
MCH RBC QN AUTO: 30.9 PG (ref 26.6–33)
MCHC RBC AUTO-ENTMCNC: 33.8 G/DL (ref 31.5–35.7)
MCV RBC AUTO: 91.5 FL (ref 79–97)
MONOCYTES # BLD AUTO: 0.4 10*3/MM3 (ref 0.1–0.9)
MONOCYTES NFR BLD AUTO: 8 % (ref 5–12)
NEUTROPHILS NFR BLD AUTO: 3.4 10*3/MM3 (ref 1.7–7)
NEUTROPHILS NFR BLD AUTO: 74.7 % (ref 42.7–76)
NRBC BLD AUTO-RTO: 0 /100 WBC (ref 0–0.2)
PLATELET # BLD AUTO: 231 10*3/MM3 (ref 140–450)
PMV BLD AUTO: 9.2 FL (ref 6–12)
POTASSIUM SERPL-SCNC: 3.9 MMOL/L (ref 3.5–5.2)
RBC # BLD AUTO: 3.2 10*6/MM3 (ref 4.14–5.8)
RHINOVIRUS RNA SPEC NAA+PROBE: NOT DETECTED
RSV RNA NPH QL NAA+NON-PROBE: NOT DETECTED
SARS-COV-2 RNA NPH QL NAA+NON-PROBE: DETECTED
SODIUM SERPL-SCNC: 139 MMOL/L (ref 136–145)
WBC # BLD AUTO: 4.5 10*3/MM3 (ref 3.4–10.8)

## 2020-08-25 PROCEDURE — 99232 SBSQ HOSP IP/OBS MODERATE 35: CPT | Performed by: NURSE PRACTITIONER

## 2020-08-25 PROCEDURE — 97166 OT EVAL MOD COMPLEX 45 MIN: CPT

## 2020-08-25 PROCEDURE — 99232 SBSQ HOSP IP/OBS MODERATE 35: CPT | Performed by: INTERNAL MEDICINE

## 2020-08-25 PROCEDURE — 25010000002 PIPERACILLIN SOD-TAZOBACTAM PER 1 G: Performed by: INTERNAL MEDICINE

## 2020-08-25 PROCEDURE — 97535 SELF CARE MNGMENT TRAINING: CPT

## 2020-08-25 PROCEDURE — 97530 THERAPEUTIC ACTIVITIES: CPT

## 2020-08-25 PROCEDURE — 97112 NEUROMUSCULAR REEDUCATION: CPT

## 2020-08-25 PROCEDURE — 71045 X-RAY EXAM CHEST 1 VIEW: CPT

## 2020-08-25 PROCEDURE — 85025 COMPLETE CBC W/AUTO DIFF WBC: CPT | Performed by: INTERNAL MEDICINE

## 2020-08-25 PROCEDURE — 85379 FIBRIN DEGRADATION QUANT: CPT | Performed by: INTERNAL MEDICINE

## 2020-08-25 PROCEDURE — 25010000002 ENOXAPARIN PER 10 MG: Performed by: NURSE PRACTITIONER

## 2020-08-25 PROCEDURE — 80048 BASIC METABOLIC PNL TOTAL CA: CPT | Performed by: INTERNAL MEDICINE

## 2020-08-25 RX ADMIN — CETIRIZINE HYDROCHLORIDE 5 MG: 10 TABLET, FILM COATED ORAL at 08:06

## 2020-08-25 RX ADMIN — LEVETIRACETAM 500 MG: 500 TABLET ORAL at 08:06

## 2020-08-25 RX ADMIN — TRAZODONE HYDROCHLORIDE 50 MG: 50 TABLET ORAL at 20:04

## 2020-08-25 RX ADMIN — CLOPIDOGREL BISULFATE 75 MG: 75 TABLET ORAL at 08:06

## 2020-08-25 RX ADMIN — PIPERACILLIN AND TAZOBACTAM 3.38 G: 3; .375 INJECTION, POWDER, FOR SOLUTION INTRAVENOUS at 16:16

## 2020-08-25 RX ADMIN — PIPERACILLIN AND TAZOBACTAM 3.38 G: 3; .375 INJECTION, POWDER, FOR SOLUTION INTRAVENOUS at 08:06

## 2020-08-25 RX ADMIN — TAMSULOSIN HYDROCHLORIDE 0.8 MG: 0.4 CAPSULE ORAL at 08:06

## 2020-08-25 RX ADMIN — Medication 10 ML: at 08:06

## 2020-08-25 RX ADMIN — ENOXAPARIN SODIUM 40 MG: 40 INJECTION SUBCUTANEOUS at 16:16

## 2020-08-25 RX ADMIN — AZITHROMYCIN MONOHYDRATE 500 MG: 250 TABLET ORAL at 08:06

## 2020-08-25 RX ADMIN — LEVETIRACETAM 500 MG: 500 TABLET ORAL at 20:04

## 2020-08-25 RX ADMIN — DEXAMETHASONE 6 MG: 6 TABLET ORAL at 08:06

## 2020-08-25 RX ADMIN — PIPERACILLIN AND TAZOBACTAM 3.38 G: 3; .375 INJECTION, POWDER, FOR SOLUTION INTRAVENOUS at 00:27

## 2020-08-25 RX ADMIN — AMLODIPINE BESYLATE 10 MG: 5 TABLET ORAL at 08:06

## 2020-08-25 RX ADMIN — SODIUM CHLORIDE 100 ML/HR: 900 INJECTION, SOLUTION INTRAVENOUS at 08:08

## 2020-08-25 RX ADMIN — CALCIUM CARBONATE (ANTACID) CHEW TAB 500 MG 2 TABLET: 500 CHEW TAB at 16:16

## 2020-08-25 RX ADMIN — HYDRALAZINE HYDROCHLORIDE 50 MG: 25 TABLET, FILM COATED ORAL at 08:06

## 2020-08-25 RX ADMIN — Medication 10 ML: at 20:04

## 2020-08-25 RX ADMIN — Medication 5 MG: at 20:04

## 2020-08-25 RX ADMIN — BENZONATATE 100 MG: 100 CAPSULE ORAL at 17:27

## 2020-08-25 RX ADMIN — FINASTERIDE 5 MG: 5 TABLET, FILM COATED ORAL at 08:06

## 2020-08-25 RX ADMIN — ROSUVASTATIN CALCIUM 20 MG: 10 TABLET, FILM COATED ORAL at 08:06

## 2020-08-25 RX ADMIN — HYDRALAZINE HYDROCHLORIDE 50 MG: 25 TABLET, FILM COATED ORAL at 20:04

## 2020-08-26 LAB
ANION GAP SERPL CALCULATED.3IONS-SCNC: 12 MMOL/L (ref 5–15)
BASOPHILS # BLD AUTO: 0 10*3/MM3 (ref 0–0.2)
BASOPHILS NFR BLD AUTO: 0.1 % (ref 0–1.5)
BUN SERPL-MCNC: 26 MG/DL (ref 8–23)
BUN SERPL-MCNC: ABNORMAL MG/DL
BUN/CREAT SERPL: ABNORMAL
CALCIUM SPEC-SCNC: 8.5 MG/DL (ref 8.6–10.5)
CHLORIDE SERPL-SCNC: 108 MMOL/L (ref 98–107)
CO2 SERPL-SCNC: 18 MMOL/L (ref 22–29)
CREAT SERPL-MCNC: 0.95 MG/DL (ref 0.76–1.27)
DEPRECATED RDW RBC AUTO: 45.9 FL (ref 37–54)
EOSINOPHIL # BLD AUTO: 0 10*3/MM3 (ref 0–0.4)
EOSINOPHIL NFR BLD AUTO: 0 % (ref 0.3–6.2)
ERYTHROCYTE [DISTWIDTH] IN BLOOD BY AUTOMATED COUNT: 14.3 % (ref 12.3–15.4)
GFR SERPL CREATININE-BSD FRML MDRD: 78 ML/MIN/1.73
GLUCOSE SERPL-MCNC: 105 MG/DL (ref 65–99)
HCT VFR BLD AUTO: 30.2 % (ref 37.5–51)
HGB BLD-MCNC: 10.3 G/DL (ref 13–17.7)
LYMPHOCYTES # BLD AUTO: 1.4 10*3/MM3 (ref 0.7–3.1)
LYMPHOCYTES NFR BLD AUTO: 16.1 % (ref 19.6–45.3)
MCH RBC QN AUTO: 31.1 PG (ref 26.6–33)
MCHC RBC AUTO-ENTMCNC: 34 G/DL (ref 31.5–35.7)
MCV RBC AUTO: 91.5 FL (ref 79–97)
MONOCYTES # BLD AUTO: 0.7 10*3/MM3 (ref 0.1–0.9)
MONOCYTES NFR BLD AUTO: 7.8 % (ref 5–12)
NEUTROPHILS NFR BLD AUTO: 6.5 10*3/MM3 (ref 1.7–7)
NEUTROPHILS NFR BLD AUTO: 76 % (ref 42.7–76)
NRBC BLD AUTO-RTO: 0.1 /100 WBC (ref 0–0.2)
PLATELET # BLD AUTO: 248 10*3/MM3 (ref 140–450)
PMV BLD AUTO: 9.3 FL (ref 6–12)
POTASSIUM SERPL-SCNC: 3.9 MMOL/L (ref 3.5–5.2)
RBC # BLD AUTO: 3.31 10*6/MM3 (ref 4.14–5.8)
SODIUM SERPL-SCNC: 138 MMOL/L (ref 136–145)
WBC # BLD AUTO: 8.5 10*3/MM3 (ref 3.4–10.8)

## 2020-08-26 PROCEDURE — 80048 BASIC METABOLIC PNL TOTAL CA: CPT | Performed by: INTERNAL MEDICINE

## 2020-08-26 PROCEDURE — 25010000002 PIPERACILLIN SOD-TAZOBACTAM PER 1 G: Performed by: INTERNAL MEDICINE

## 2020-08-26 PROCEDURE — 93005 ELECTROCARDIOGRAM TRACING: CPT | Performed by: INTERNAL MEDICINE

## 2020-08-26 PROCEDURE — 99231 SBSQ HOSP IP/OBS SF/LOW 25: CPT | Performed by: INTERNAL MEDICINE

## 2020-08-26 PROCEDURE — 99232 SBSQ HOSP IP/OBS MODERATE 35: CPT | Performed by: INTERNAL MEDICINE

## 2020-08-26 PROCEDURE — 25010000002 ENOXAPARIN PER 10 MG: Performed by: NURSE PRACTITIONER

## 2020-08-26 PROCEDURE — 85025 COMPLETE CBC W/AUTO DIFF WBC: CPT | Performed by: INTERNAL MEDICINE

## 2020-08-26 RX ADMIN — ENOXAPARIN SODIUM 40 MG: 40 INJECTION SUBCUTANEOUS at 16:08

## 2020-08-26 RX ADMIN — DEXAMETHASONE 6 MG: 6 TABLET ORAL at 07:46

## 2020-08-26 RX ADMIN — Medication 10 ML: at 07:48

## 2020-08-26 RX ADMIN — SODIUM CHLORIDE 100 ML/HR: 900 INJECTION, SOLUTION INTRAVENOUS at 01:05

## 2020-08-26 RX ADMIN — LEVETIRACETAM 500 MG: 500 TABLET ORAL at 20:42

## 2020-08-26 RX ADMIN — CALCIUM CARBONATE (ANTACID) CHEW TAB 500 MG 2 TABLET: 500 CHEW TAB at 20:39

## 2020-08-26 RX ADMIN — HYDRALAZINE HYDROCHLORIDE 50 MG: 25 TABLET, FILM COATED ORAL at 07:46

## 2020-08-26 RX ADMIN — PIPERACILLIN AND TAZOBACTAM 3.38 G: 3; .375 INJECTION, POWDER, FOR SOLUTION INTRAVENOUS at 16:08

## 2020-08-26 RX ADMIN — PIPERACILLIN AND TAZOBACTAM 3.38 G: 3; .375 INJECTION, POWDER, FOR SOLUTION INTRAVENOUS at 07:48

## 2020-08-26 RX ADMIN — Medication 10 ML: at 20:41

## 2020-08-26 RX ADMIN — LEVETIRACETAM 500 MG: 500 TABLET ORAL at 07:47

## 2020-08-26 RX ADMIN — CETIRIZINE HYDROCHLORIDE 5 MG: 10 TABLET, FILM COATED ORAL at 07:47

## 2020-08-26 RX ADMIN — TAMSULOSIN HYDROCHLORIDE 0.8 MG: 0.4 CAPSULE ORAL at 07:47

## 2020-08-26 RX ADMIN — FINASTERIDE 5 MG: 5 TABLET, FILM COATED ORAL at 07:48

## 2020-08-26 RX ADMIN — TRAZODONE HYDROCHLORIDE 50 MG: 50 TABLET ORAL at 20:40

## 2020-08-26 RX ADMIN — PIPERACILLIN AND TAZOBACTAM 3.38 G: 3; .375 INJECTION, POWDER, FOR SOLUTION INTRAVENOUS at 01:00

## 2020-08-26 RX ADMIN — Medication 5 MG: at 20:42

## 2020-08-26 RX ADMIN — HYDRALAZINE HYDROCHLORIDE 50 MG: 25 TABLET, FILM COATED ORAL at 20:40

## 2020-08-26 RX ADMIN — CLOPIDOGREL BISULFATE 75 MG: 75 TABLET ORAL at 07:46

## 2020-08-26 RX ADMIN — ROSUVASTATIN CALCIUM 20 MG: 10 TABLET, FILM COATED ORAL at 07:46

## 2020-08-26 RX ADMIN — AMLODIPINE BESYLATE 10 MG: 5 TABLET ORAL at 07:47

## 2020-08-27 PROCEDURE — 99232 SBSQ HOSP IP/OBS MODERATE 35: CPT | Performed by: NURSE PRACTITIONER

## 2020-08-27 PROCEDURE — 25010000002 PIPERACILLIN SOD-TAZOBACTAM PER 1 G: Performed by: INTERNAL MEDICINE

## 2020-08-27 PROCEDURE — 97530 THERAPEUTIC ACTIVITIES: CPT

## 2020-08-27 PROCEDURE — 25010000002 ENOXAPARIN PER 10 MG: Performed by: NURSE PRACTITIONER

## 2020-08-27 PROCEDURE — 99232 SBSQ HOSP IP/OBS MODERATE 35: CPT | Performed by: INTERNAL MEDICINE

## 2020-08-27 RX ORDER — AMOXICILLIN AND CLAVULANATE POTASSIUM 875; 125 MG/1; MG/1
1 TABLET, FILM COATED ORAL 2 TIMES DAILY WITH MEALS
Status: COMPLETED | OUTPATIENT
Start: 2020-08-27 | End: 2020-08-29

## 2020-08-27 RX ADMIN — LEVETIRACETAM 500 MG: 500 TABLET ORAL at 09:10

## 2020-08-27 RX ADMIN — PIPERACILLIN AND TAZOBACTAM 3.38 G: 3; .375 INJECTION, POWDER, FOR SOLUTION INTRAVENOUS at 02:05

## 2020-08-27 RX ADMIN — BENZONATATE 100 MG: 100 CAPSULE ORAL at 09:11

## 2020-08-27 RX ADMIN — CALCIUM CARBONATE (ANTACID) CHEW TAB 500 MG 2 TABLET: 500 CHEW TAB at 11:57

## 2020-08-27 RX ADMIN — Medication 10 ML: at 09:11

## 2020-08-27 RX ADMIN — CETIRIZINE HYDROCHLORIDE 5 MG: 10 TABLET, FILM COATED ORAL at 09:10

## 2020-08-27 RX ADMIN — SODIUM CHLORIDE 100 ML/HR: 900 INJECTION, SOLUTION INTRAVENOUS at 02:05

## 2020-08-27 RX ADMIN — AMLODIPINE BESYLATE 10 MG: 5 TABLET ORAL at 09:10

## 2020-08-27 RX ADMIN — TAMSULOSIN HYDROCHLORIDE 0.8 MG: 0.4 CAPSULE ORAL at 09:10

## 2020-08-27 RX ADMIN — FINASTERIDE 5 MG: 5 TABLET, FILM COATED ORAL at 09:11

## 2020-08-27 RX ADMIN — CLOPIDOGREL BISULFATE 75 MG: 75 TABLET ORAL at 09:10

## 2020-08-27 RX ADMIN — TRAZODONE HYDROCHLORIDE 50 MG: 50 TABLET ORAL at 21:04

## 2020-08-27 RX ADMIN — Medication 10 ML: at 21:04

## 2020-08-27 RX ADMIN — DEXAMETHASONE 6 MG: 6 TABLET ORAL at 09:10

## 2020-08-27 RX ADMIN — HYDRALAZINE HYDROCHLORIDE 50 MG: 25 TABLET, FILM COATED ORAL at 09:10

## 2020-08-27 RX ADMIN — ENOXAPARIN SODIUM 40 MG: 40 INJECTION SUBCUTANEOUS at 17:10

## 2020-08-27 RX ADMIN — HYDRALAZINE HYDROCHLORIDE 50 MG: 25 TABLET, FILM COATED ORAL at 21:04

## 2020-08-27 RX ADMIN — LEVETIRACETAM 500 MG: 500 TABLET ORAL at 21:04

## 2020-08-27 RX ADMIN — ROSUVASTATIN CALCIUM 20 MG: 10 TABLET, FILM COATED ORAL at 21:04

## 2020-08-27 RX ADMIN — AMOXICILLIN AND CLAVULANATE POTASSIUM 1 TABLET: 875; 125 TABLET, FILM COATED ORAL at 09:10

## 2020-08-27 RX ADMIN — AMOXICILLIN AND CLAVULANATE POTASSIUM 1 TABLET: 875; 125 TABLET, FILM COATED ORAL at 17:10

## 2020-08-27 RX ADMIN — SODIUM CHLORIDE 100 ML/HR: 900 INJECTION, SOLUTION INTRAVENOUS at 17:16

## 2020-08-28 ENCOUNTER — APPOINTMENT (OUTPATIENT)
Dept: GENERAL RADIOLOGY | Facility: HOSPITAL | Age: 70
End: 2020-08-28

## 2020-08-28 LAB
BACTERIA SPEC AEROBE CULT: NORMAL
BACTERIA SPEC AEROBE CULT: NORMAL

## 2020-08-28 PROCEDURE — 97530 THERAPEUTIC ACTIVITIES: CPT

## 2020-08-28 PROCEDURE — 99233 SBSQ HOSP IP/OBS HIGH 50: CPT | Performed by: INTERNAL MEDICINE

## 2020-08-28 PROCEDURE — 99231 SBSQ HOSP IP/OBS SF/LOW 25: CPT | Performed by: INTERNAL MEDICINE

## 2020-08-28 PROCEDURE — 71045 X-RAY EXAM CHEST 1 VIEW: CPT

## 2020-08-28 PROCEDURE — 25010000002 ENOXAPARIN PER 10 MG: Performed by: NURSE PRACTITIONER

## 2020-08-28 PROCEDURE — 97110 THERAPEUTIC EXERCISES: CPT

## 2020-08-28 RX ORDER — AMOXICILLIN AND CLAVULANATE POTASSIUM 875; 125 MG/1; MG/1
1 TABLET, FILM COATED ORAL 2 TIMES DAILY WITH MEALS
Qty: 8 TABLET | Refills: 0 | Status: SHIPPED | OUTPATIENT
Start: 2020-08-28 | End: 2020-09-11 | Stop reason: HOSPADM

## 2020-08-28 RX ADMIN — Medication 10 ML: at 10:49

## 2020-08-28 RX ADMIN — AMOXICILLIN AND CLAVULANATE POTASSIUM 1 TABLET: 875; 125 TABLET, FILM COATED ORAL at 17:15

## 2020-08-28 RX ADMIN — BENZONATATE 100 MG: 100 CAPSULE ORAL at 10:47

## 2020-08-28 RX ADMIN — AMOXICILLIN AND CLAVULANATE POTASSIUM 1 TABLET: 875; 125 TABLET, FILM COATED ORAL at 10:47

## 2020-08-28 RX ADMIN — HYDRALAZINE HYDROCHLORIDE 50 MG: 25 TABLET, FILM COATED ORAL at 10:46

## 2020-08-28 RX ADMIN — ENOXAPARIN SODIUM 40 MG: 40 INJECTION SUBCUTANEOUS at 17:15

## 2020-08-28 RX ADMIN — ROSUVASTATIN CALCIUM 20 MG: 10 TABLET, FILM COATED ORAL at 20:40

## 2020-08-28 RX ADMIN — DEXAMETHASONE 6 MG: 6 TABLET ORAL at 10:47

## 2020-08-28 RX ADMIN — TAMSULOSIN HYDROCHLORIDE 0.8 MG: 0.4 CAPSULE ORAL at 10:47

## 2020-08-28 RX ADMIN — LEVETIRACETAM 500 MG: 500 TABLET ORAL at 10:47

## 2020-08-28 RX ADMIN — LEVETIRACETAM 500 MG: 500 TABLET ORAL at 20:42

## 2020-08-28 RX ADMIN — FINASTERIDE 5 MG: 5 TABLET, FILM COATED ORAL at 10:48

## 2020-08-28 RX ADMIN — CALCIUM CARBONATE (ANTACID) CHEW TAB 500 MG 2 TABLET: 500 CHEW TAB at 10:47

## 2020-08-28 RX ADMIN — CLOPIDOGREL BISULFATE 75 MG: 75 TABLET ORAL at 10:48

## 2020-08-28 RX ADMIN — CETIRIZINE HYDROCHLORIDE 5 MG: 10 TABLET, FILM COATED ORAL at 10:46

## 2020-08-28 RX ADMIN — AMLODIPINE BESYLATE 10 MG: 5 TABLET ORAL at 10:47

## 2020-08-28 RX ADMIN — HYDRALAZINE HYDROCHLORIDE 50 MG: 25 TABLET, FILM COATED ORAL at 20:40

## 2020-08-28 RX ADMIN — TRAZODONE HYDROCHLORIDE 50 MG: 50 TABLET ORAL at 20:42

## 2020-08-28 RX ADMIN — Medication 10 ML: at 20:42

## 2020-08-28 RX ADMIN — SODIUM CHLORIDE 100 ML/HR: 900 INJECTION, SOLUTION INTRAVENOUS at 03:24

## 2020-08-29 LAB
ANION GAP SERPL CALCULATED.3IONS-SCNC: 11 MMOL/L (ref 5–15)
BUN SERPL-MCNC: 15 MG/DL (ref 8–23)
BUN SERPL-MCNC: ABNORMAL MG/DL
BUN/CREAT SERPL: ABNORMAL
CALCIUM SPEC-SCNC: 8.3 MG/DL (ref 8.6–10.5)
CHLORIDE SERPL-SCNC: 106 MMOL/L (ref 98–107)
CO2 SERPL-SCNC: 21 MMOL/L (ref 22–29)
CREAT SERPL-MCNC: 0.82 MG/DL (ref 0.76–1.27)
DEPRECATED RDW RBC AUTO: 43.8 FL (ref 37–54)
ERYTHROCYTE [DISTWIDTH] IN BLOOD BY AUTOMATED COUNT: 13.9 % (ref 12.3–15.4)
GFR SERPL CREATININE-BSD FRML MDRD: 93 ML/MIN/1.73
GLUCOSE SERPL-MCNC: 98 MG/DL (ref 65–99)
HCT VFR BLD AUTO: 32.1 % (ref 37.5–51)
HGB BLD-MCNC: 11.2 G/DL (ref 13–17.7)
LYMPHOCYTES # BLD MANUAL: 1.15 10*3/MM3 (ref 0.7–3.1)
LYMPHOCYTES NFR BLD MANUAL: 18 % (ref 19.6–45.3)
LYMPHOCYTES NFR BLD MANUAL: 8 % (ref 5–12)
MCH RBC QN AUTO: 31.4 PG (ref 26.6–33)
MCHC RBC AUTO-ENTMCNC: 34.8 G/DL (ref 31.5–35.7)
MCV RBC AUTO: 90.2 FL (ref 79–97)
METAMYELOCYTES NFR BLD MANUAL: 1 % (ref 0–0)
MONOCYTES # BLD AUTO: 0.51 10*3/MM3 (ref 0.1–0.9)
NEUTROPHILS # BLD AUTO: 4.67 10*3/MM3 (ref 1.7–7)
NEUTROPHILS NFR BLD MANUAL: 73 % (ref 42.7–76)
PLAT MORPH BLD: NORMAL
PLATELET # BLD AUTO: 323 10*3/MM3 (ref 140–450)
PMV BLD AUTO: 8.8 FL (ref 6–12)
POTASSIUM SERPL-SCNC: 3.4 MMOL/L (ref 3.5–5.2)
POTASSIUM SERPL-SCNC: 3.9 MMOL/L (ref 3.5–5.2)
RBC # BLD AUTO: 3.56 10*6/MM3 (ref 4.14–5.8)
RBC MORPH BLD: NORMAL
SCAN SLIDE: NORMAL
SODIUM SERPL-SCNC: 138 MMOL/L (ref 136–145)
WBC # BLD AUTO: 6.4 10*3/MM3 (ref 3.4–10.8)
WBC MORPH BLD: NORMAL

## 2020-08-29 PROCEDURE — 85025 COMPLETE CBC W/AUTO DIFF WBC: CPT | Performed by: INTERNAL MEDICINE

## 2020-08-29 PROCEDURE — 85007 BL SMEAR W/DIFF WBC COUNT: CPT | Performed by: INTERNAL MEDICINE

## 2020-08-29 PROCEDURE — 25010000002 ENOXAPARIN PER 10 MG: Performed by: NURSE PRACTITIONER

## 2020-08-29 PROCEDURE — 99232 SBSQ HOSP IP/OBS MODERATE 35: CPT | Performed by: INTERNAL MEDICINE

## 2020-08-29 PROCEDURE — 84132 ASSAY OF SERUM POTASSIUM: CPT | Performed by: INTERNAL MEDICINE

## 2020-08-29 PROCEDURE — 80048 BASIC METABOLIC PNL TOTAL CA: CPT | Performed by: INTERNAL MEDICINE

## 2020-08-29 RX ADMIN — POTASSIUM CHLORIDE 40 MEQ: 1500 TABLET, EXTENDED RELEASE ORAL at 08:20

## 2020-08-29 RX ADMIN — CETIRIZINE HYDROCHLORIDE 5 MG: 10 TABLET, FILM COATED ORAL at 08:20

## 2020-08-29 RX ADMIN — AMOXICILLIN AND CLAVULANATE POTASSIUM 1 TABLET: 875; 125 TABLET, FILM COATED ORAL at 08:20

## 2020-08-29 RX ADMIN — AMLODIPINE BESYLATE 10 MG: 5 TABLET ORAL at 08:20

## 2020-08-29 RX ADMIN — TAMSULOSIN HYDROCHLORIDE 0.8 MG: 0.4 CAPSULE ORAL at 08:20

## 2020-08-29 RX ADMIN — CLOPIDOGREL BISULFATE 75 MG: 75 TABLET ORAL at 08:20

## 2020-08-29 RX ADMIN — ENOXAPARIN SODIUM 40 MG: 40 INJECTION SUBCUTANEOUS at 16:07

## 2020-08-29 RX ADMIN — DEXAMETHASONE 6 MG: 6 TABLET ORAL at 08:20

## 2020-08-29 RX ADMIN — LEVETIRACETAM 500 MG: 500 TABLET ORAL at 20:18

## 2020-08-29 RX ADMIN — TRAZODONE HYDROCHLORIDE 50 MG: 50 TABLET ORAL at 20:18

## 2020-08-29 RX ADMIN — SODIUM CHLORIDE 100 ML/HR: 900 INJECTION, SOLUTION INTRAVENOUS at 03:56

## 2020-08-29 RX ADMIN — ROSUVASTATIN CALCIUM 20 MG: 10 TABLET, FILM COATED ORAL at 20:18

## 2020-08-29 RX ADMIN — HYDRALAZINE HYDROCHLORIDE 50 MG: 25 TABLET, FILM COATED ORAL at 08:20

## 2020-08-29 RX ADMIN — Medication 10 ML: at 20:18

## 2020-08-29 RX ADMIN — LEVETIRACETAM 500 MG: 500 TABLET ORAL at 08:20

## 2020-08-29 RX ADMIN — HYDRALAZINE HYDROCHLORIDE 50 MG: 25 TABLET, FILM COATED ORAL at 20:18

## 2020-08-29 RX ADMIN — Medication 10 ML: at 08:20

## 2020-08-29 RX ADMIN — SODIUM CHLORIDE 100 ML/HR: 900 INJECTION, SOLUTION INTRAVENOUS at 20:21

## 2020-08-29 RX ADMIN — FINASTERIDE 5 MG: 5 TABLET, FILM COATED ORAL at 08:20

## 2020-08-30 PROCEDURE — 25010000002 ENOXAPARIN PER 10 MG: Performed by: NURSE PRACTITIONER

## 2020-08-30 RX ADMIN — AMLODIPINE BESYLATE 10 MG: 5 TABLET ORAL at 08:33

## 2020-08-30 RX ADMIN — Medication 10 ML: at 21:29

## 2020-08-30 RX ADMIN — ENOXAPARIN SODIUM 40 MG: 40 INJECTION SUBCUTANEOUS at 15:43

## 2020-08-30 RX ADMIN — Medication 5 MG: at 21:29

## 2020-08-30 RX ADMIN — CETIRIZINE HYDROCHLORIDE 5 MG: 10 TABLET, FILM COATED ORAL at 08:33

## 2020-08-30 RX ADMIN — DEXAMETHASONE 6 MG: 6 TABLET ORAL at 08:33

## 2020-08-30 RX ADMIN — Medication 10 ML: at 08:33

## 2020-08-30 RX ADMIN — ROSUVASTATIN CALCIUM 20 MG: 10 TABLET, FILM COATED ORAL at 21:29

## 2020-08-30 RX ADMIN — CLOPIDOGREL BISULFATE 75 MG: 75 TABLET ORAL at 08:33

## 2020-08-30 RX ADMIN — FINASTERIDE 5 MG: 5 TABLET, FILM COATED ORAL at 08:33

## 2020-08-30 RX ADMIN — LEVETIRACETAM 500 MG: 500 TABLET ORAL at 08:33

## 2020-08-30 RX ADMIN — HYDRALAZINE HYDROCHLORIDE 50 MG: 25 TABLET, FILM COATED ORAL at 21:29

## 2020-08-30 RX ADMIN — HYDRALAZINE HYDROCHLORIDE 50 MG: 25 TABLET, FILM COATED ORAL at 08:33

## 2020-08-30 RX ADMIN — SODIUM CHLORIDE 100 ML/HR: 900 INJECTION, SOLUTION INTRAVENOUS at 06:31

## 2020-08-30 RX ADMIN — LEVETIRACETAM 500 MG: 500 TABLET ORAL at 21:29

## 2020-08-30 RX ADMIN — TAMSULOSIN HYDROCHLORIDE 0.8 MG: 0.4 CAPSULE ORAL at 08:33

## 2020-08-30 RX ADMIN — TRAZODONE HYDROCHLORIDE 50 MG: 50 TABLET ORAL at 21:29

## 2020-08-31 PROCEDURE — 97112 NEUROMUSCULAR REEDUCATION: CPT

## 2020-08-31 PROCEDURE — 25010000002 ENOXAPARIN PER 10 MG: Performed by: NURSE PRACTITIONER

## 2020-08-31 PROCEDURE — 93010 ELECTROCARDIOGRAM REPORT: CPT | Performed by: INTERNAL MEDICINE

## 2020-08-31 PROCEDURE — 99232 SBSQ HOSP IP/OBS MODERATE 35: CPT | Performed by: HOSPITALIST

## 2020-08-31 PROCEDURE — 97530 THERAPEUTIC ACTIVITIES: CPT

## 2020-08-31 RX ADMIN — Medication 10 ML: at 21:46

## 2020-08-31 RX ADMIN — TRAZODONE HYDROCHLORIDE 50 MG: 50 TABLET ORAL at 21:45

## 2020-08-31 RX ADMIN — TAMSULOSIN HYDROCHLORIDE 0.8 MG: 0.4 CAPSULE ORAL at 08:44

## 2020-08-31 RX ADMIN — HYDRALAZINE HYDROCHLORIDE 50 MG: 25 TABLET, FILM COATED ORAL at 21:45

## 2020-08-31 RX ADMIN — LEVETIRACETAM 500 MG: 500 TABLET ORAL at 08:44

## 2020-08-31 RX ADMIN — CALCIUM CARBONATE (ANTACID) CHEW TAB 500 MG 2 TABLET: 500 CHEW TAB at 12:37

## 2020-08-31 RX ADMIN — CLOPIDOGREL BISULFATE 75 MG: 75 TABLET ORAL at 08:44

## 2020-08-31 RX ADMIN — FINASTERIDE 5 MG: 5 TABLET, FILM COATED ORAL at 08:45

## 2020-08-31 RX ADMIN — AMLODIPINE BESYLATE 10 MG: 5 TABLET ORAL at 08:44

## 2020-08-31 RX ADMIN — CETIRIZINE HYDROCHLORIDE 5 MG: 10 TABLET, FILM COATED ORAL at 08:45

## 2020-08-31 RX ADMIN — Medication 5 MG: at 21:45

## 2020-08-31 RX ADMIN — ENOXAPARIN SODIUM 40 MG: 40 INJECTION SUBCUTANEOUS at 17:33

## 2020-08-31 RX ADMIN — LEVETIRACETAM 500 MG: 500 TABLET ORAL at 21:45

## 2020-08-31 RX ADMIN — ROSUVASTATIN CALCIUM 20 MG: 10 TABLET, FILM COATED ORAL at 21:45

## 2020-08-31 RX ADMIN — DEXAMETHASONE 6 MG: 6 TABLET ORAL at 08:45

## 2020-08-31 RX ADMIN — HYDRALAZINE HYDROCHLORIDE 50 MG: 25 TABLET, FILM COATED ORAL at 08:45

## 2020-08-31 RX ADMIN — Medication 10 ML: at 08:45

## 2020-09-01 LAB

## 2020-09-01 PROCEDURE — 99231 SBSQ HOSP IP/OBS SF/LOW 25: CPT | Performed by: HOSPITALIST

## 2020-09-01 PROCEDURE — 97535 SELF CARE MNGMENT TRAINING: CPT

## 2020-09-01 PROCEDURE — 97530 THERAPEUTIC ACTIVITIES: CPT

## 2020-09-01 PROCEDURE — 0202U NFCT DS 22 TRGT SARS-COV-2: CPT | Performed by: HOSPITALIST

## 2020-09-01 PROCEDURE — 25010000002 ENOXAPARIN PER 10 MG: Performed by: NURSE PRACTITIONER

## 2020-09-01 RX ADMIN — LEVETIRACETAM 500 MG: 500 TABLET ORAL at 08:05

## 2020-09-01 RX ADMIN — TAMSULOSIN HYDROCHLORIDE 0.8 MG: 0.4 CAPSULE ORAL at 08:05

## 2020-09-01 RX ADMIN — FINASTERIDE 5 MG: 5 TABLET, FILM COATED ORAL at 08:05

## 2020-09-01 RX ADMIN — ENOXAPARIN SODIUM 40 MG: 40 INJECTION SUBCUTANEOUS at 15:53

## 2020-09-01 RX ADMIN — ROSUVASTATIN CALCIUM 20 MG: 10 TABLET, FILM COATED ORAL at 20:59

## 2020-09-01 RX ADMIN — DEXAMETHASONE 6 MG: 6 TABLET ORAL at 08:05

## 2020-09-01 RX ADMIN — HYDRALAZINE HYDROCHLORIDE 50 MG: 25 TABLET, FILM COATED ORAL at 20:59

## 2020-09-01 RX ADMIN — Medication 10 ML: at 20:59

## 2020-09-01 RX ADMIN — Medication 10 ML: at 08:06

## 2020-09-01 RX ADMIN — AMLODIPINE BESYLATE 10 MG: 5 TABLET ORAL at 08:05

## 2020-09-01 RX ADMIN — CLOPIDOGREL BISULFATE 75 MG: 75 TABLET ORAL at 08:05

## 2020-09-01 RX ADMIN — LEVETIRACETAM 500 MG: 500 TABLET ORAL at 20:59

## 2020-09-01 RX ADMIN — HYDRALAZINE HYDROCHLORIDE 50 MG: 25 TABLET, FILM COATED ORAL at 08:05

## 2020-09-01 RX ADMIN — TRAZODONE HYDROCHLORIDE 50 MG: 50 TABLET ORAL at 20:59

## 2020-09-01 RX ADMIN — CETIRIZINE HYDROCHLORIDE 5 MG: 10 TABLET, FILM COATED ORAL at 08:05

## 2020-09-01 RX ADMIN — Medication 5 MG: at 20:59

## 2020-09-01 NOTE — PLAN OF CARE
Problem: Patient Care Overview  Goal: Plan of Care Review  Outcome: Ongoing (interventions implemented as appropriate)  Flowsheets (Taken 9/1/2020 1100)  Outcome Summary: Patient is 69 yo male with PMHx including CVA being followed for delirium, PNA, COVID19. Patient appears irritated with lack of discharge, anasognostic regarding functional status, and is perserverative about his time in [place].  Pt demos some learned helplessness, but appears dependent on idea that his electric scooter will facilitate independence at home. Pt max A for attempted sit>stand, but completes squat-pivot with Mod-Max A. Mod A for LB ADLs. cont to recomment IP rehab at discharge. PPE: GLoves, N95, surgical mask, bonnet, gown, shoe covers, face shield

## 2020-09-01 NOTE — PROGRESS NOTES
Continued Stay Note  [facility]     Patient Name: Ivan Maradiaga  MRN: [id number]  Today's Date: 9/1/2020    Admit Date: 8/23/2020    Discharge Plan     Row Name 09/01/20 1535       Plan    Plan  [facility] informed.    Row Name 09/01/20 1506       Plan    Plan  Brenna agreed with DC. Daughter has now requested a second level of APPEAL or reconsideration of DC. Pending response from Brenna.    Plan Comments  Requested name of PCP from daughter, Maureen Donald. She will call back around 3 pm with PCP name. She must look through some documents.               Expected Discharge Date and Time     Expected Discharge Date Expected Discharge Time    Aug 28, 2020             Karuna Garza RN

## 2020-09-01 NOTE — PROGRESS NOTES
[facility] Medicine Services Daily Progress Note      Hospitalist Team  LOS 8 days      Patient Care Team:  Kelly Gill DO as PCP - General (Physical Medicine and Rehabilitation)    Patient Location: 203/1      Subjective   Subjective   No complaints or events overnight.  Chief Complaint / Subjective  Chief Complaint   Patient presents with   • Weakness - Generalized         Brief Synopsis of Hospital Course/HPI  70-year-old man with history of CAD, dementia, CVA with left-sided residual weakness ,? CHF and hypertension.  According to admission note and talking to his daughter--> he was recently treated at [facility] for STEMI and rhabdomyolysis in the setting of acute kidney injury after a mechanical fall.  He was subsequently discharged to rehab facility for continued physical therapy. Apparently, he was discharged from rehab on 8/22/2020.  He presented to [facility] ED with  AMS/encephalopathy.  He endorses  mild chest discomfort  shortness of breath with scant  productive cough.  No reported fever, chills, no night sweats, and no abdominal pain nausea or vomiting.  On arrival to [facility], he was screened and tested positive for COVID-19 and subsequently placed on isolation.  Chest x-ray revealed patchy peripheral airspace opacity concerning for bilateral multifocal pneumonia.      Review of record here at [facility] showed multiple testing here on 8/14 and 8/15 which were negative.  However, he had another testing done on 8/23/2020 which was positive.   Otherwise, he remained clinically stable and oxygenating well on room air. Laboratory showed static, nonzero slight evaded troponin and EKG showed sinus rhythm with RBBB.       He was evaluated by cardiologist and ID.  Patient does not require Remdisivir with stable oxygenation on RA.  He was  maintained on isolation and treated with IV antibiotic with Zosyn, and later switched to  Augmentin for COVID  "related pneumonia possible secondary bacterial infection.  The patient was felt to be stable for discharge however his daughter appealed to Medicare per nursing staff reporting that the patient's daughter wanted the patient's COVID test to be negative prior to discharge.     8/31/2020: The patient denies current complaints.  He is anxious to go home and is asking when his COVID test will be repeated.    9/1/2020: Patient denies current complaints except for feeling cold and asking for his blankets.    I spoke with the patient's daughter Maureen.  She is requesting repeat COVID testing so that if the patient is negative he will have more options for inpatient rehab since only one facility locally is taking COVID positive patients.  The COVID test was ordered resulted as positive.  Patient's daughter was notified.    Review of systems:  12 point review of systems was reviewed and was negative except as above.    Objective      Objective      Vital Signs  Temp:  [95.2 °F (35.1 °C)-97.6 °F (36.4 °C)] 97.2 °F (36.2 °C)  Heart Rate:  [58-73] 73  Resp:  [16-19] 17  BP: (145-162)/(78-85) 145/80  Oxygen Therapy  SpO2: 93 %  Pulse Oximetry Type: Continuous  Device (Oxygen Therapy): room air  Device (Oxygen Therapy): room air  Flow (L/min): 2  Flowsheet Rows      First Filed Value   Admission Height  182.9 cm (72\") Documented at 08/23/2020 0314   Admission Weight  78 kg (171 lb 15.3 oz) Documented at 08/23/2020 0314        Intake & Output (last 3 days)       08/29 0701 - 08/30 0700 08/30 0701 - 08/31 0700 08/31 0701 - 09/01 0700 09/01 0701 - 09/02 0700    P.O. 660 1200 480 240    I.V. (mL/kg) 1947 (22)       Total Intake(mL/kg) 2607 (29.5) 1200 (13.6) 480 (5.4) 240 (2.7)    Urine (mL/kg/hr) 1070 (0.5) 600 (0.3) 500 (0.2)     Stool 0       Total Output 1070 600 500     Net +1537 +600 -20 +240            Urine Unmeasured Occurrence  1 x      Stool Unmeasured Occurrence 1 x           Lines, Drains & Airways    Active LDAs     " Name:   Placement date:   Placement time:   Site:   Days:    Peripheral IV 08/27/20 1717 Right Antecubital   08/27/20 1717    Antecubital   1              Physical Exam  Well-developed well-nourished in no acute distress sitting up in bed awake and alert; mucous membranes moist; sclerae anicteric; lungs clear to auscultation bilaterally; CV regular rate and rhythm; abdomen soft nontender nondistended with active bowel sounds; extremities with no edema, cyanosis or calf tenderness; palpable pedal pulses bilaterally; no Mancini catheter.        Procedures:    Results Review:     I reviewed the patient's new clinical results.      Lab Results (last 24 hours)     Procedure Component Value Units Date/Time    Respiratory Panel PCR w/COVID-19(SARS-CoV-2) MAIRA/NANDINI/ANA/PAD/COR/MAD In-House, NP Swab in UTM/VTM, 3-4 HR TAT - Swab, Nasopharynx [[id number]]  (Abnormal) Collected:  09/01/20 1424    Specimen:  Swab from Nasopharynx Updated:  09/01/20 1558     ADENOVIRUS, PCR Not Detected     Coronavirus 229E Not Detected     Coronavirus HKU1 Not Detected     Coronavirus NL63 Not Detected     Coronavirus OC43 Not Detected     COVID19 Detected     Human Metapneumovirus Not Detected     Human Rhinovirus/Enterovirus Not Detected     Influenza A PCR Not Detected     Influenza A H1 Not Detected     Influenza A H1 2009 PCR Not Detected     Influenza A H3 Not Detected     Influenza B PCR Not Detected     Parainfluenza Virus 1 Not Detected     Parainfluenza Virus 2 Not Detected     Parainfluenza Virus 3 Not Detected     Parainfluenza Virus 4 Not Detected     RSV, PCR Not Detected     Bordetella pertussis pcr Not Detected     Bordetella parapertussis PCR Not Detected     Chlamydophila pneumoniae PCR Not Detected     Mycoplasma pneumo by PCR Not Detected    Narrative:       Fact sheet for providers: https://docs.iContact/wp-content/uploads/JEC3119-9841-UO1.1-EUA-Provider-Fact-Sheet-3.pdf    Fact sheet for patients:  https://docs.AdWhirl/wp-content/uploads/OUZ8662-9632-KQ2.1-EUA-Patient-Fact-Sheet-1.pdf        No results found for: HGBA1C            No results found for: LIPASE  No results found for: CHOL, CHLPL, TRIG, HDL, LDL, LDLDIRECT    No results found for: INTRAOP, PREDX, FINALDX, COMDX    Microbiology Results (last 10 days)     Procedure Component Value - Date/Time    Respiratory Panel PCR w/COVID-19(SARS-CoV-2) MAIRA/NANDINI/ANA/PAD/COR/MAD In-House, NP Swab in UTM/VTM, 3-4 HR TAT - Swab, Nasopharynx [[id number]]  (Abnormal) Collected:  09/01/20 1424    Lab Status:  Final result Specimen:  Swab from Nasopharynx Updated:  09/01/20 1558     ADENOVIRUS, PCR Not Detected     Coronavirus 229E Not Detected     Coronavirus HKU1 Not Detected     Coronavirus NL63 Not Detected     Coronavirus OC43 Not Detected     COVID19 Detected     Human Metapneumovirus Not Detected     Human Rhinovirus/Enterovirus Not Detected     Influenza A PCR Not Detected     Influenza A H1 Not Detected     Influenza A H1 2009 PCR Not Detected     Influenza A H3 Not Detected     Influenza B PCR Not Detected     Parainfluenza Virus 1 Not Detected     Parainfluenza Virus 2 Not Detected     Parainfluenza Virus 3 Not Detected     Parainfluenza Virus 4 Not Detected     RSV, PCR Not Detected     Bordetella pertussis pcr Not Detected     Bordetella parapertussis PCR Not Detected     Chlamydophila pneumoniae PCR Not Detected     Mycoplasma pneumo by PCR Not Detected    Narrative:       Fact sheet for providers: https://docs.AdWhirl/wp-content/uploads/MDM9479-7629-KW4.1-EUA-Provider-Fact-Sheet-3.pdf    Fact sheet for patients: https://docs.AdWhirl/wp-content/uploads/JCX1703-6665-PT6.1-EUA-Patient-Fact-Sheet-1.pdf    Respiratory Panel PCR w/COVID-19(SARS-CoV-2) MAIRA/NANDINI/ANA/PAD In-House, NP Swab in UTM/VTM, 3-4 HR TAT - Swab, Nasopharynx [[id number]]  (Abnormal) Collected:  08/24/20 2054    Lab Status:  Final result Specimen:  Swab from Nasopharynx  Updated:  08/24/20 2329     ADENOVIRUS, PCR Not Detected     Coronavirus 229E Not Detected     Coronavirus HKU1 Not Detected     Coronavirus NL63 Not Detected     Coronavirus OC43 Not Detected     COVID19 Detected     Human Metapneumovirus Not Detected     Human Rhinovirus/Enterovirus Not Detected     Influenza A PCR Not Detected     Influenza A H1 Not Detected     Influenza A H1 2009 PCR Not Detected     Influenza A H3 Not Detected     Influenza B PCR Not Detected     Parainfluenza Virus 1 Not Detected     Parainfluenza Virus 2 Not Detected     Parainfluenza Virus 3 Not Detected     Parainfluenza Virus 4 Not Detected     RSV, PCR Not Detected     Bordetella pertussis pcr Not Detected     Bordetella parapertussis PCR Not Detected     Chlamydophila pneumoniae PCR Not Detected     Mycoplasma pneumo by PCR Not Detected    Narrative:       Fact sheet for providers: https://[employer]/wp-content/uploads/WFU5333-9282-CW0.1-EUA-Provider-Fact-Sheet-3.pdf    Fact sheet for patients: https://"[employer].CORP80/wp-content/uploads/FRZ7645-3470-EW5.1-EUA-Patient-Fact-Sheet-1.pdf    COVID PRE-OP / PRE-PROCEDURE SCREENING ORDER (NO ISOLATION) - Swab, Nasopharynx [[id number]] Collected:  08/23/20 1256    Lab Status:  Edited Result - FINAL Specimen:  Swab from Nasopharynx Updated:  08/25/20 1334    Narrative:       The following orders were created for panel order COVID PRE-OP / PRE-PROCEDURE SCREENING ORDER (NO ISOLATION) - Swab, Nasopharynx.  Procedure                               Abnormality         Status                     ---------                               -----------         ------                     Respiratory Panel PCR w/...[[id number]]  Abnormal            Edited Result - FINAL        Please view results for these tests on the individual orders.    Respiratory Panel PCR w/COVID-19(SARS-CoV-2) MAIRA/NANDINI/ANA/PAD In-House, NP Swab in UTM/VTM, 3-4 HR TAT - Swab, Nasopharynx [[id number]]  (Abnormal) Collected:   08/23/20 1256    Lab Status:  Edited Result - FINAL Specimen:  Swab from Nasopharynx Updated:  08/25/20 1334     ADENOVIRUS, PCR Not Detected     Coronavirus 229E Not Detected     Coronavirus HKU1 Not Detected     Coronavirus NL63 Not Detected     Coronavirus OC43 Not Detected     COVID19 Detected     Human Metapneumovirus Not Detected     Human Rhinovirus/Enterovirus Not Detected     Influenza A PCR Not Detected     Influenza A H1 Not Detected     Influenza A H1 2009 PCR Not Detected     Influenza A H3 Not Detected     Influenza B PCR Not Detected     Parainfluenza Virus 1 Not Detected     Parainfluenza Virus 2 Not Detected     Parainfluenza Virus 3 Not Detected     Parainfluenza Virus 4 Not Detected     RSV, PCR Not Detected     Bordetella pertussis pcr Not Detected     Bordetella parapertussis PCR Not Detected     Chlamydophila pneumoniae PCR Not Detected     Mycoplasma pneumo by PCR Not Detected    Narrative:       Fact sheet for providers: https://docs.[employer]/wp-content/uploads/CYD7065-1728-HT8.1-EUA-Provider-Fact-Sheet-3.pdf    Fact sheet for patients: https://docs.[employer]/wp-content/uploads/TZM7756-1146-DY9.1-EUA-Patient-Fact-Sheet-1.pdf    Urine Culture - Urine, Urine, Catheter In/Out [[id number]]  (Abnormal) Collected:  08/23/20 0344    Lab Status:  Final result Specimen:  Urine, Catheter In/Out Updated:  08/24/20 1702     Urine Culture >100,000 CFU/mL Staphylococcus, coagulase negative     Comment:   Call if work-up needed.       Blood Culture - Blood, Arm, Right [[id number]] Collected:  08/23/20 0343    Lab Status:  Final result Specimen:  Blood from Arm, Right Updated:  08/28/20 0400     Blood Culture No growth at 5 days    Blood Culture - Blood, Arm, Left [[id number]] Collected:  08/23/20 0343    Lab Status:  Final result Specimen:  Blood from Arm, Left Updated:  08/28/20 0400     Blood Culture No growth at 5 days          ECG/EMG Results (most recent)     Procedure Component Value Units  Date/Time    ECG 12 Lead [[id number]] Collected:  08/23/20 0619     Updated:  08/23/20 0621    Narrative:       HEART RATE= 91  bpm  RR Interval= 660  ms  NM Interval= 163  ms  P Horizontal Axis= 24  deg  P Front Axis= 64  deg  QRSD Interval= 152  ms  QT Interval= 382  ms  QRS Axis= -83  deg  T Wave Axis= -32  deg  - ABNORMAL ECG -  Sinus rhythm  RBBB and LAFB  Probable lateral infarct, age indeterminate  Electronically Signed By:   Date and Time of Study: 2020-08-23 06:19:41    ECG 12 Lead [[id number]] Collected:  08/26/20 1325     Updated:  08/31/20 2032    Narrative:       HEART RATE= 98  bpm  RR Interval= 612  ms  NM Interval= 163  ms  P Horizontal Axis= 40  deg  P Front Axis= 60  deg  QRSD Interval= 154  ms  QT Interval= 396  ms  QRS Axis= 258  deg  T Wave Axis= -53  deg  - ABNORMAL ECG -  Sinus rhythm  Ventricular premature complex  Inferior infarct, old  When compared with ECG of 23-Aug-2020 6:19:41,  No significant change  Electronically Signed By: Jorge Chacon (ANA) 31-Aug-2020 20:08:03  Date and Time of Study: 2020-08-26 13:25:06                    Xr Chest 1 View    Result Date: 8/28/2020  The lungs consistent with multifocal pneumonia, without significant change from 08/25/2020.  Electronically Signed By-Dr. Alexus Mejias MD On:8/28/2020 11:03 AM This report was finalized on [id number] by Dr. Alexus Mejias MD.    Xr Chest 1 View    Result Date: 8/25/2020   1. Persistent multifocal areas of interstitial pneumonia is noted without significant change from prior exam.   Electronically Signed By-Chago Mckeon On:8/25/2020 8:03 AM This report was finalized on [id number] by  Chago Mckeon, .          Xrays, labs reviewed personally by physician.    Medication Review:   I have reviewed the patient's current medication list      Scheduled Meds    amLODIPine 10 mg Oral Daily   cetirizine 5 mg Oral Daily   clopidogrel 75 mg Oral Daily   dexamethasone 6 mg Oral Daily With Breakfast   enoxaparin 40  mg Subcutaneous Q24H   finasteride 5 mg Oral Daily   hydrALAZINE 50 mg Oral BID   levETIRAcetam 500 mg Oral BID   rosuvastatin 20 mg Oral Daily   sodium chloride 10 mL Intravenous Q12H   tamsulosin 0.8 mg Oral Daily   traZODone 50 mg Oral Nightly       Meds Infusions    sodium chloride 100 mL/hr Last Rate: 100 mL/hr (08/30/20 0631)       Meds PRN  •  acetaminophen **OR** acetaminophen **OR** acetaminophen  •  benzonatate  •  calcium carbonate  •  melatonin  •  ondansetron **OR** ondansetron  •  potassium chloride  •  potassium chloride  •  sodium chloride        Assessment/Plan   Assessment/Plan     Active Hospital Problems    Diagnosis  POA   • **Pneumonia due to COVID-19 virus [U07.1, J12.89]  Unknown   • Delirium [R41.0]  Yes   • Weakness [R53.1]  Yes      Resolved Hospital Problems   No resolved problems to display.       MEDICAL DECISION MAKING COMPLEXITY BY PROBLEM:   Acute COVID-19 pneumonia with possible secondary bacterial infection        -oxygenating well on room air          -treated with IV Zosyn and followed by oral antibiotic with Augmentin.      Coronary disease, recent non-ST elevation MI         -on Amlodipine/Plavix/hydralazine/Crestor             Essential hypertension, chronic and controlled         -Continue amlodipine /hydralazine      BPH       -Continue Proscar/Flomax     History of CVA with residual left-sided hemiparesis        -Continue Plavix/Crestor, and bedside range of motion as tolerated     Disposition:  He was discharged 8/29/2020, however, daughter contested discharge           VTE Prophylaxis -   Mechanical Order History:     None      Pharmalogical Order History:     Ordered     Dose Route Frequency Stop    08/23/20 0559  enoxaparin (LOVENOX) syringe 40 mg      40 mg SC Every 24 Hours --            Code Status -   Code Status and Medical Interventions:   Ordered at: 08/23/20 0559     Code Status:    CPR     Medical Interventions (Level of Support Prior to Arrest):    Full        Discharge Planning  Pending discharge disposition plans.      Electronically signed by Tessie Brenner MD, 09/01/20, 16:28.  [facility] Hospitalist Team

## 2020-09-01 NOTE — PLAN OF CARE
Patient resting abed, no complaints voiced at this time. He has used his call light multiple times throughout the evening, at times allowing cluster care. Patient is stating that he was ready to go home. I talked with him about rehab and he stated that did not sound to bad. Patient stated that he needed to talk with his daughter before he could make a decision.

## 2020-09-01 NOTE — PROGRESS NOTES
Continued Stay Note  [facility]     Patient Name: Ivan Maradiaga  MRN: [id number]  Today's Date: 9/1/2020    Admit Date: 8/23/2020    Discharge Plan     Row Name 09/01/20 1506       Plan    Plan  Brenna agreed with DC. Daughter has now requested a second level of APPEAL or reconsideration of DC. Pending response from Brenna.    Plan Comments  Requested name of PCP from daughter, Maureen Donald. She will call back around 3 pm with PCP name. She must look through some documents.               Expected Discharge Date and Time     Expected Discharge Date Expected Discharge Time    Aug 28, 2020             Karuna Garza RN

## 2020-09-01 NOTE — PROGRESS NOTES
Spoke with daughter a few times today regarding issues around discharge planning and placement as well as concerns about repeated chest xray and Covid test. She provided history of her fathers recent health issues and how and why Mr. Maradiaga was transferred to [facility].  She stated she is POA and I reminded her if patient is decisional we have to go by his wishes, however, if she or we believe he is not able to make decisions we can order a psych consult if necessary.  Based on our conversation it sounds like he can be non-compliant and she is working with him to help him make better decisions as he has been on the decline in health since recent stroke.    I mentioned the CM team is following appropriate protocol regarding placement decisions and [facility] cannot keep patient against his will, however, we need to have a safe discharge plan. She stated she does not like [facility] or other facilities due to their condition.   We discussed many facilities are not taking Covid+ patients again and leaves us with limited options in this area.      She has spoken with [facility] in [place] for home health to subsidize [facility] since she believes her father needs more 24/7 supervision then what home health typically provides.    Pt has DC orders in since 8/28 and daughter has appealed the discharge.  [place] upheld the first appeal and daughter has resubmitted for reconsideration.    I let her know we cannot hold her father against his will but we may need to find a compromise regarding placement so he can go to a suitable rehab facility.  If patient test comes back negative there will be more options as long as patient continues to participate with rehab and she understands that.    Cher Rg DNP, ANH, RN  Director of Care Coordination  [facility]

## 2020-09-01 NOTE — PROGRESS NOTES
PATIENT LOST DISCHARGE APPEAL BUT HAS FILED REQUEST FOR RECONSIDERATION OF THE DISCHARGE APPEAL WITH NIYA FOY.     NO ADDITIONAL RECORDS ARE NEEDED AT THIS TIME.  CASE REF # REMAINS [id number].     PATIENT CANNOT BE DISCHARGED WHILE THIS APPEAL IS ACTIVE UNLESS THE PATIENT/FAMILY CALL TO RESCIND THE APPEAL.     NIYA PHONE:  [phone]          Melisa Jones  Utilization Review Coordinator  [address]  [address]  Ph: [phone]  Fx: [phone]

## 2020-09-01 NOTE — THERAPY TREATMENT NOTE
"Acute Care - Occupational Therapy Treatment Note   [facility]     Patient Name: Ivan Maradiaga  : 1950  MRN: [id number]  Today's Date: 2020             Admit Date: 2020       ICD-10-CM ICD-9-CM   1. Delirium R41.0 780.09   2. Pneumonia due to COVID-19 virus U07.1 480.8    J12.89    3. Weakness R53.1 780.79     Patient Active Problem List   Diagnosis   • Delirium   • Weakness   • Pneumonia due to COVID-19 virus     Past Medical History:   Diagnosis Date   • CAD (coronary artery disease)    • CHF (congestive heart failure) (CMS/[facility])    • Dementia (CMS/[facility])    • Hypertension      History reviewed. No pertinent surgical history.    Therapy Treatment    Rehabilitation Treatment Summary     Row Name 20 1100             Treatment Time/Intention    Discipline  occupational therapist  -ES      Document Type  therapy note (daily note)  -ES      Mode of Treatment  --  -ES      Therapy Frequency (PT Clinical Impression)  3 times/wk  -ES      Therapy Frequency (OT Eval)  5 times/wk  -ES      Patient Effort  fair  -ES      Comment  \"I wish my daughter would help me get home.\"  -ES      Existing Precautions/Restrictions  oxygen therapy device and L/min;fall left side weakness from prior CVA  -ES      Recorded by [ES] Irene Lara OT 20 1119      Row Name 20 1100             Vital Signs    Pretreatment Heart Rate (beats/min)  100  -ES      Intratreatment Heart Rate (beats/min)  127  -ES      Posttreatment Heart Rate (beats/min)  94  -ES      Pre SpO2 (%)  97  -ES      O2 Delivery Pre Treatment  room air  -ES      Intra SpO2 (%)  96  -ES      O2 Delivery Intra Treatment  room air  -ES      Post SpO2 (%)  98  -ES      O2 Delivery Post Treatment  room air  -ES      Pre Patient Position  Supine  -ES      Intra Patient Position  Sitting  -ES      Post Patient Position  Sitting  -ES      Recorded by [ES] Irene Lara OT 20 1119      Row Name 20 1100             Cognitive " Assessment/Intervention    Additional Documentation  Cognitive Assessment/Intervention (Group)  -ES      Recorded by [ES] Irene Lara, OT 09/01/20 1119      Row Name 09/01/20 1100             Cognitive Assessment/Intervention- PT/OT    Affect/Mental Status (Cognitive)  agitated  -ES      Behavioral Issues (Cognitive)  overwhelmed easily  -ES      Orientation Status (Cognition)  oriented x 4  -ES      Follows Commands (Cognition)  follows one step commands;50-74% accuracy  -ES      Cognitive Function (Cognitive)  attention deficit;executive function deficit;safety deficit;memory deficit  -ES      Attention Deficit (Cognitive)  mild deficit  -ES      Executive Function Deficit (Cognition)  mild deficit;organization/sequencing;planning/decision making;problem solving/reasoning  -ES      Memory Deficit (Cognitive)  moderate deficit  -ES      Safety Deficit (Cognitive)  moderate deficit  -ES      Cognitive Assessment/Intervention Comment  Short Blessed Test: 6/28, difficulty wth delayed recall  -ES      Recorded by [ES] Irene Lara OT 09/01/20 1119      Row Name 09/01/20 1100             Safety Issues, Functional Mobility    Safety Issues Affecting Function (Mobility)  safety precaution awareness;safety precautions follow-through/compliance;impulsivity;insight into deficits/self awareness;judgment;at risk behavior observed;awareness of need for assistance  -ES      Impairments Affecting Function (Mobility)  balance;cognition;postural/trunk control;endurance/activity tolerance;strength  -ES      Recorded by [ES] Irene Lara, OT 09/01/20 1119      Row Name 09/01/20 1100             Bed Mobility Assessment/Treatment    Bed Mobility Assessment/Treatment  supine-sit;sit-supine  -ES      Scooting/Bridging [place] (Bed Mobility)  moderate assist (50% patient effort)  -ES      Supine-Sit [place] (Bed Mobility)  moderate assist (50% patient effort)  -ES      Sit-Supine [place] (Bed Mobility)   moderate assist (50% patient effort)  -ES      Assistive Device (Bed Mobility)  bed rails;draw sheet  -ES      Recorded by [ES] Irene Lara, OT 09/01/20 1119      Row Name 09/01/20 1100             Transfer Assessment/Treatment    Transfer Assessment/Treatment  bed-chair transfer;chair-bed transfer  -ES      Recorded by [ES] Irene Lara, OT 09/01/20 1119      Row Name 09/01/20 1100             Bed-Chair Transfer    Bed-Chair [place] (Transfers)  not tested  -ES      Recorded by [ES] Irene Lara, OT 09/01/20 1119      Row Name 09/01/20 1100             Chair-Bed Transfer    Chair-Bed [place] (Transfers)  minimum assist (75% patient effort) squat pivot  -ES      Recorded by [ES] Irene Lara, OT 09/01/20 1119      Row Name 09/01/20 1100             Sit-Stand Transfer    Sit-Stand [place] (Transfers)  moderate assist (50% patient effort)  -ES      Assistive Device (Sit-Stand Transfers)  cane, quad  -ES      Recorded by [ES] Irene Lara, OT 09/01/20 1119      Row Name 09/01/20 1100             Gait/Stairs Assessment/Training    [place] Level (Gait)  unable to assess  -ES      Recorded by [ES] Irene Lara, OT 09/01/20 1119      Row Name 09/01/20 1100             Upper Body Dressing Assessment/Training    Upper Body Dressing [place] Level  minimum assist (75% patient effort)  -ES      Recorded by [ES] Irene Lara, OT 09/01/20 1119      Row Name 09/01/20 1100             Lower Body Dressing Assessment/Training    Lower Body Dressing [place] Level  moderate assist (50% patient effort) weight shift   -ES      Recorded by [ES] Irene Lara, OT 09/01/20 1327      Row Name 09/01/20 1100             Self-Feeding Assessment/Training    [place] Level (Feeding)  set up  -ES      Recorded by [ES] Irene Lara, OT 09/01/20 1119      Row Name 09/01/20 1100             BADL Safety/Performance    Impairments, BADL Safety/Performance   balance;cognition  -ES      Recorded by [ES] Irene Lara, OT 09/01/20 1327      Row Name 09/01/20 1100             Motor Skills Assessment/Interventions    Additional Documentation  Neuromuscular Re-education (Group)  -ES      Recorded by [ES] Irene Lara, OT 09/01/20 1119      Row Name 09/01/20 1100             Balance    Balance  static sitting balance;static standing balance;dynamic sitting balance  -ES      Recorded by [ES] Irene Lara, OT 09/01/20 1119      Row Name 09/01/20 1100             Static Sitting Balance    Level of [place] (Unsupported Sitting, Static Balance)  standby assist  -ES      Sitting Position (Unsupported Sitting, Static Balance)  sitting on edge of bed  -ES      Time Able to Maintain Position (Unsupported Sitting, Static Balance)  more than 5 minutes  -ES      Recorded by [ES] Irene Lara, OT 09/01/20 1119      Row Name 09/01/20 1100             Dynamic Sitting Balance    Level of [place], Reaches Outside Midline (Sitting, Dynamic Balance)  contact guard assist;minimal assist, 75% patient effort  -ES      Sitting Position, Reaches Outside Midline (Sitting, Dynamic Balance)  sitting on edge of bed  -ES      Recorded by [ES] Irene Lara, OT 09/01/20 1119      Row Name 09/01/20 1100             Static Standing Balance    Level of [place] (Supported Standing, Static Balance)  maximal assist, 25 to 49% patient effort  -ES      Time Able to Maintain Position (Supported Standing, Static Balance)  15 to 30 seconds  -ES      Assistive Device Utilized (Supported Standing, Static Balance)  cane, quad  -ES      Recorded by [ES] Irene Lara, OT 09/01/20 1119      Row Name 09/01/20 1100             Dynamic Standing Balance    Level of [place], Reaches Outside Midline (Standing, Dynamic Balance)  unable to perform activity  -ES      Recorded by [ES] Irene Lara, OT 09/01/20 1327      Row Name 09/01/20 1100             Neuromuscular  Re-education    Comment, Neuromuscular Re-education  1 finger sublux in LUE shoulder  -ES      Recorded by [ES] Irene Lara, OT 09/01/20 1119      Row Name 09/01/20 1100             Positioning and Restraints    Pre-Treatment Position  in bed  -ES      Post Treatment Position  bed  -ES      Recorded by [ES] Irene Lara, OT 09/01/20 1119      Row Name 09/01/20 1100             Pain Scale: Numbers Pre/Post-Treatment    Pain Scale: Numbers, Pretreatment  --  -ES      Pain Scale: Numbers, Post-Treatment  --  -ES      Recorded by [ES] Irene Lara, OT 09/01/20 1119      Row Name 09/01/20 1100             Coping    Observed Emotional State  attention-seeking behavior;frustrated  -ES      Verbalized Emotional State  frustration  -ES      Recorded by [ES] Irene Lara, OT 09/01/20 1327      Row Name 09/01/20 1100             Plan of Care Review    Plan of Care Reviewed With  patient  -ES      Outcome Summary  Patient is 71 yo male with PMHx including CVA being followed for delirium, PNA, COVID19. Patient appears irritated with lack of discharge, anasognostic regarding functional status, and is perserverative about his time in [place].  Pt demos some learned helplessness, but appears dependent on idea that his electric scooter will facilitate independence at home. Pt max A for attempted sit>stand, but completes squat-pivot with Mod-Max A. Mod A for LB ADLs. cont to recomment IP rehab at discharge. PPE: GLoves, N95, surgical mask, bonnet, gown, shoe covers, face shield  -ES2      Recorded by [ES] Irene Lara, OT 09/01/20 1119  [ES2] Irene Lara, OT 09/01/20 1327      Row Name 09/01/20 1100             Outcome Summary/Treatment Plan (OT)    Anticipated Discharge Disposition (OT)  inpatient rehabilitation facility  -ES      Recorded by [ES] Irene Lara, OT 09/01/20 1119      Row Name 09/01/20 1100             Outcome Summary/Treatment Plan (PT)    Anticipated Discharge Disposition  (PT)  inpatient rehabilitation facility  -      Recorded by [SHAQ] JaneSangitaIrenefemi GARCIA OT 09/01/20 1119        User Key  (r) = Recorded By, (t) = Taken By, (c) = Cosigned By    Initials Name Effective Dates Discipline    SHAQ Irene Lara DESTINEE GARCIA 03/01/19 -  OT             Occupational Therapy Education                 Title: PT OT SLP Therapies (In Progress)     Topic: Occupational Therapy (Done)     Point: ADL training (Done)     Description:   Instruct learner(s) on proper safety adaptation and remediation techniques during self care or transfers.   Instruct in proper use of assistive devices.              Learning Progress Summary           Patient Acceptance, E,TB, VU,NR by ES at 9/1/2020 1327    Acceptance, E, VU by JOSE E at 8/29/2020 1025    Acceptance, E, VU by KS at 8/26/2020 2332    Acceptance, E,TB, VU by SS at 8/26/2020 0312    Acceptance, E,TB, VU by ES at 8/25/2020 1702                   Point: Home exercise program (Done)     Description:   Instruct learner(s) on appropriate technique for monitoring, assisting and/or progressing therapeutic exercises/activities.              Learning Progress Summary           Patient Acceptance, E, VU by SW at 8/29/2020 1025    Acceptance, E,D,TB, VU,NR by  at 8/28/2020 1553    Acceptance, E, VU by KS at 8/26/2020 2332    Acceptance, E,TB, VU by SS at 8/26/2020 0312                   Point: Precautions (Done)     Description:   Instruct learner(s) on prescribed precautions during self-care and functional transfers.              Learning Progress Summary           Patient Acceptance, E,TB, VU,NR by ES at 9/1/2020 1327    Acceptance, E, VU by SW at 8/29/2020 1025    Acceptance, E, VU by KS at 8/26/2020 2332    Acceptance, E,TB, VU by SS at 8/26/2020 0312    Acceptance, E,TB, VU by ES at 8/25/2020 1702                   Point: Body mechanics (Done)     Description:   Instruct learner(s) on proper positioning and spine alignment during self-care, functional mobility  activities and/or exercises.              Learning Progress Summary           Patient Acceptance, E,TB, VU,NR by ES at 9/1/2020 1327    Acceptance, E, VU by  at 8/29/2020 1025    Acceptance, E,D,TB, VU,NR by  at 8/28/2020 1553    Acceptance, E, VU by KS at 8/26/2020 2332    Acceptance, E,TB, VU by  at 8/26/2020 0312    Acceptance, E,TB, VU by  at 8/25/2020 1702                               User Key     Initials Effective Dates Name Provider Type Discipline     03/01/19 -  Jessica Ansari OT Occupational Therapist OT    ES 03/01/19 -  Irene Lara OT Occupational Therapist OT     06/24/19 -  Rita Jones LPN Licensed Nurse Nurse     06/24/19 -  Manuela Dorsey LPN Licensed Nurse Nurse    [place] 03/01/19 -  Rizwana Yuan RN Registered Nurse Nurse                OT Recommendation and Plan  Outcome Summary/Treatment Plan (OT)  Anticipated Discharge Disposition (OT): inpatient rehabilitation facility  Planned Therapy Interventions (OT Eval): activity tolerance training, BADL retraining, adaptive equipment training, IADL retraining, functional balance retraining, manual therapy/joint mobilization, occupation/activity based interventions, patient/caregiver education/training, prosthetic fitting/training, ROM/therapeutic exercise, strengthening exercise  Therapy Frequency (OT Eval): 5 times/wk  Plan of Care Review  Plan of Care Reviewed With: patient  Plan of Care Reviewed With: patient  Outcome Summary: Patient is 71 yo male with PMHx including CVA being followed for delirium, PNA, COVID19. Patient appears irritated with lack of discharge, anasognostic regarding functional status, and is perserverative about his time in [place].  Pt demos some learned helplessness, but appears dependent on idea that his electric scooter will facilitate independence at home. Pt max A for attempted sit>stand, but completes squat-pivot with Mod-Max A. Mod A for LB ADLs. cont to recomment IP rehab at  discharge. PPE: GLoves, N95, surgical mask, bonnet, gown, shoe covers, face shield       Time Calculation:   Time Calculation- OT     Row Name 09/01/20 1327             Time Calculation- OT    OT Start Time  1055  -ES      OT Stop Time  1124  -ES      OT Time Calculation (min)  29 min  -ES      Total Timed Code Minutes- OT  29 minute(s)  -ES      OT Received On  09/01/20  -ES      OT - Next Appointment  09/02/20  -        User Key  (r) = Recorded By, (t) = Taken By, (c) = Cosigned By    Initials Name Provider Type     Irene Lara OT Occupational Therapist        Therapy Charges for Today     Code Description Service Date Service Provider Modifiers Qty    [id number]  OT SELF CARE/MGMT/TRAIN EA 15 MIN 9/1/2020 Irene Lara OT GO 1    [id number]  OT THERAPEUTIC ACT EA 15 MIN 9/1/2020 Irene Lara, OT GO 1               Irene Lara OT  9/1/2020

## 2020-09-02 PROCEDURE — 25010000002 ENOXAPARIN PER 10 MG: Performed by: NURSE PRACTITIONER

## 2020-09-02 PROCEDURE — 97112 NEUROMUSCULAR REEDUCATION: CPT

## 2020-09-02 PROCEDURE — 99231 SBSQ HOSP IP/OBS SF/LOW 25: CPT | Performed by: HOSPITALIST

## 2020-09-02 PROCEDURE — 97530 THERAPEUTIC ACTIVITIES: CPT

## 2020-09-02 RX ADMIN — CETIRIZINE HYDROCHLORIDE 5 MG: 10 TABLET, FILM COATED ORAL at 10:48

## 2020-09-02 RX ADMIN — TRAZODONE HYDROCHLORIDE 50 MG: 50 TABLET ORAL at 20:41

## 2020-09-02 RX ADMIN — Medication 10 ML: at 10:48

## 2020-09-02 RX ADMIN — Medication 5 MG: at 20:41

## 2020-09-02 RX ADMIN — LEVETIRACETAM 500 MG: 500 TABLET ORAL at 10:48

## 2020-09-02 RX ADMIN — ENOXAPARIN SODIUM 40 MG: 40 INJECTION SUBCUTANEOUS at 16:46

## 2020-09-02 RX ADMIN — HYDRALAZINE HYDROCHLORIDE 50 MG: 25 TABLET, FILM COATED ORAL at 20:41

## 2020-09-02 RX ADMIN — Medication 10 ML: at 20:40

## 2020-09-02 RX ADMIN — TAMSULOSIN HYDROCHLORIDE 0.8 MG: 0.4 CAPSULE ORAL at 10:48

## 2020-09-02 RX ADMIN — ROSUVASTATIN CALCIUM 20 MG: 10 TABLET, FILM COATED ORAL at 20:40

## 2020-09-02 RX ADMIN — FINASTERIDE 5 MG: 5 TABLET, FILM COATED ORAL at 10:49

## 2020-09-02 RX ADMIN — CLOPIDOGREL BISULFATE 75 MG: 75 TABLET ORAL at 10:50

## 2020-09-02 RX ADMIN — DEXAMETHASONE 6 MG: 6 TABLET ORAL at 10:51

## 2020-09-02 RX ADMIN — HYDRALAZINE HYDROCHLORIDE 50 MG: 25 TABLET, FILM COATED ORAL at 10:50

## 2020-09-02 RX ADMIN — LEVETIRACETAM 500 MG: 500 TABLET ORAL at 20:40

## 2020-09-02 RX ADMIN — CALCIUM CARBONATE (ANTACID) CHEW TAB 500 MG 2 TABLET: 500 CHEW TAB at 16:46

## 2020-09-02 RX ADMIN — AMLODIPINE BESYLATE 10 MG: 5 TABLET ORAL at 10:50

## 2020-09-02 NOTE — PROGRESS NOTES
Nutrition Services    Patient Name:  Ivan Maradiaga  YOB: 1950  MRN: [id number]  Admit Date:  8/23/2020      Progress note:   Chart reviewed for LOS.    Patient reports a good appetite, doing well with his meals.  Noted patient is frequently eating 2 meals/day- patient reports this is how he eats at home.  Eats a full breakfast and dinner and multiple snacks PTA. Denies taking a supplement PTA and is not interested in one at this time.  Pending discharge is noted.    Pt reports a stable weight of 180lb- current weight noted at 195lb.  Pt reports a BM on 9/1.  No skin breakdown is noted.  Labs reviewed- no indication for Consistent Carb diet restriction, removed by THOR.    RD to follow per protocol.        Electronically signed by:  Dalila Ramesh RD  09/02/20 16:27

## 2020-09-02 NOTE — PLAN OF CARE
Patient resting abed, no complaints voiced. During the begining of the shift patient using his call light mulitple times, unable to cluster care. I spoke with patient regarding call light use and us needing time to be able to put everything on to enter his room. Patient continues to refuse pulse ox, he stated he has no trouble breathing.

## 2020-09-02 NOTE — THERAPY TREATMENT NOTE
Patient Name: Ivan Maradiaga  : 1950    MRN: [id number]                              Today's Date: 2020       Admit Date: 2020    Visit Dx:     ICD-10-CM ICD-9-CM   1. Delirium R41.0 780.09   2. Pneumonia due to COVID-19 virus U07.1 480.8    J12.89    3. Weakness R53.1 780.79     Patient Active Problem List   Diagnosis   • Delirium   • Weakness   • Pneumonia due to COVID-19 virus     Past Medical History:   Diagnosis Date   • CAD (coronary artery disease)    • CHF (congestive heart failure) (CMS/[facility])    • Dementia (CMS/[facility])    • Hypertension      History reviewed. No pertinent surgical history.  General Information     Row Name 20 1612          PT Evaluation Time/Intention    Document Type  therapy note (daily note)  -     Mode of Treatment  physical therapy  -     Row Name 20 1612          General Information    Patient Profile Reviewed?  yes  -     Existing Precautions/Restrictions  fall left UE flaccid, left LE weakness from prior CVA  -     Row Name 20 1612          Cognitive Assessment/Intervention- PT/OT    Orientation Status (Cognition)  oriented x 3  -     Row Name 20 1612          Safety Issues, Functional Mobility    Safety Issues Affecting Function (Mobility)  insight into deficits/self awareness;safety precaution awareness;awareness of need for assistance;judgment;problem solving  -     Impairments Affecting Function (Mobility)  endurance/activity tolerance;balance;cognition;strength;postural/trunk control  -       User Key  (r) = Recorded By, (t) = Taken By, (c) = Cosigned By    Initials Name Provider Type     Corinne He, PT Physical Therapist        Mobility     Row Name 20 1614          Bed Mobility Assessment/Treatment    Bed Mobility Assessment/Treatment  supine-sit;sit-supine  -     Supine-Sit [place] (Bed Mobility)  maximum assist (25% patient effort)  -     Sit-Supine [place] (Bed Mobility)  moderate assist (50%  patient effort)  -     Assistive Device (Bed Mobility)  bed rails;draw sheet  -     Row Name 09/02/20 1614          Sit-Stand Transfer    Sit-Stand [place] (Transfers)  moderate assist (50% patient effort)  -     Assistive Device (Sit-Stand Transfers)  cane, quad  -     Row Name 09/02/20 1614          Gait/Stairs Assessment/Training    [place] Level (Gait)  unable to assess  -       User Key  (r) = Recorded By, (t) = Taken By, (c) = Cosigned By    Initials Name Provider Type     Corinne He, PT Physical Therapist        Obj/Interventions     Row Name 09/02/20 1614          Static Sitting Balance    Level of [place] (Unsupported Sitting, Static Balance)  standby assist  -     Sitting Position (Unsupported Sitting, Static Balance)  sitting on edge of bed  -     Time Able to Maintain Position (Unsupported Sitting, Static Balance)  more than 5 minutes 7 minutes  -     Row Name 09/02/20 1614          Dynamic Sitting Balance    Level of [place], Reaches Outside Midline (Sitting, Dynamic Balance)  contact guard assist  -     Sitting Position, Reaches Outside Midline (Sitting, Dynamic Balance)  sitting on edge of bed  -     Row Name 09/02/20 1614          Static Standing Balance    Level of [place] (Supported Standing, Static Balance)  moderate assist, 50 to 74% patient effort  -     Time Able to Maintain Position (Supported Standing, Static Balance)  15 to 30 seconds  -     Row Name 09/02/20 1614          Dynamic Standing Balance    Level of [place], Reaches Outside Midline (Standing, Dynamic Balance)  unable to perform activity  -       User Key  (r) = Recorded By, (t) = Taken By, (c) = Cosigned By    Initials Name Provider Type     Corinne He, PT Physical Therapist        Goals/Plan     Row Name 09/02/20 1616          Bed Mobility Goal 1 (PT)    Progress/Outcomes (Bed Mobility Goal 1, PT)  progress slower than expected  -[facility] Name 09/02/20  1616          Transfer Goal 1 (PT)    Progress/Outcome (Transfer Goal 1, PT)  progress slower than expected  -HC     Row Name 09/02/20 1616          Gait Training Goal 1 (PT)    Progress/Outcome (Gait Training Goal 1, PT)  unable to make needed progress  -HC       User Key  (r) = Recorded By, (t) = Taken By, (c) = Cosigned By    Initials Name Provider Type     Corinne He, PT Physical Therapist        Clinical Impression     Row Name 09/02/20 1616          Pain Scale: Numbers Pre/Post-Treatment    Pain Scale: Numbers, Pretreatment  0/10 - no pain  -HC     Pain Scale: Numbers, Post-Treatment  0/10 - no pain  -HC     Row Name 09/02/20 1616          Physical Therapy Clinical Impression    Rehab Potential (PT Clinical Summary)  fair, will monitor progress closely  -     Row Name 09/02/20 1616          Vital Signs    Pretreatment Heart Rate (beats/min)  108  -HC     Intratreatment Heart Rate (beats/min)  141  -HC     Posttreatment Heart Rate (beats/min)  113  -     Row Name 09/02/20 1616          Positioning and Restraints    Pre-Treatment Position  in bed  -HC     Post Treatment Position  bed  -HC     In Bed  notified nsg;call light within reach;encouraged to call for assist  -HC       User Key  (r) = Recorded By, (t) = Taken By, (c) = Cosigned By    Initials Name Provider Type     Corinne He, PT Physical Therapist        Outcome Measures     Row Name 09/02/20 1618          How much help from another person do you currently need...    Turning from your back to your side while in flat bed without using bedrails?  2  -HC     Moving from lying on back to sitting on the side of a flat bed without bedrails?  2  -HC     Moving to and from a bed to a chair (including a wheelchair)?  1  -HC     Standing up from a chair using your arms (e.g., wheelchair, bedside chair)?  2  -HC     Climbing 3-5 steps with a railing?  1  -HC     To walk in hospital room?  1  -HC     AM-PAC 6 Clicks Score (PT)  9  -HC     Sarika  Name 09/02/20 1618          Modified [place] Scale    Modified [place] Scale  5 - Severe disability.  Bedridden, incontinent, and requiring constant nursing care and attention.  -     Row Name 09/02/20 1618          Functional Assessment    Outcome Measure Options  AM-PAC 6 Clicks Basic Mobility (PT);Modified [place]  -HC       User Key  (r) = Recorded By, (t) = Taken By, (c) = Cosigned By    Initials Name Provider Type    HC Corinne He, PT Physical Therapist        Physical Therapy Education                 Title: PT OT SLP Therapies (In Progress)     Topic: Physical Therapy (In Progress)     Point: Mobility training (In Progress)     Description:   Instruct learner(s) on safety and technique for assisting patient out of bed, chair or wheelchair.  Instruct in the proper use of assistive devices, such as walker, crutches, cane or brace.              Patient Friendly Description:   It's important to get you on your feet again, but we need to do so in a way that is safe for you. Falling has serious consequences, and your personal safety is the most important thing of all.        When it's time to get out of bed, one of us or a family member will sit next to you on the bed to give you support.     If your doctor or nurse tells you to use a walker, crutches, a cane, or a brace, be sure you use it every time you get out of bed, even if you think you don't need it.    Learning Progress Summary           Patient Acceptance, E, NR by  at 9/2/2020 1618    Acceptance, E, NR by HC at 8/31/2020 1602    Acceptance, E, VU by SW at 8/29/2020 1025    Acceptance, E, NR by HC at 8/28/2020 1614    Acceptance, E,TB, VU by EL at 8/27/2020 1551    Acceptance, E, VU by KS at 8/26/2020 2332    Acceptance, E,TB, VU by SS at 8/26/2020 0312    Acceptance, E,TB, VU by EL at 8/25/2020 1536    Acceptance, E,TB, VU,NR by CM at 8/24/2020 1138                               User Key     Initials Effective Dates Name Provider Type Discipline      04/17/20 -  Corinne He, PT Physical Therapist PT    CM 03/01/19 -  Mirna Bear, PT Physical Therapist PT    EL 06/23/20 -  Bradley Daniel PT Physical Therapist PT    SW 06/24/19 -  Rita Jones, LPN Licensed Nurse Nurse    SS 06/24/19 -  Manuela Dorsey LPN Licensed Nurse Nurse    KS 03/01/19 -  Rizwana Yuan RN Registered Nurse Nurse              PT Recommendation and Plan     Outcome Summary/Treatment Plan (PT)  Anticipated Discharge Disposition (PT): inpatient rehabilitation facility  Plan of Care Reviewed With: patient  Progress: no change  Outcome Summary: Pt continues to exhibit significant weakness requiring modA for bed mobility and transfers.  Pt willing to attempt two sit <-> stand transfers with modA using quad cane x15-20 seconds before needing seated rest break.  Pivot transfer not attempted due to weakness for pt safety.  Pt unable to shift weight to initiate step using quad cane.  Pt remains far from mobility baseline and not safe to return home alone due to level of assist needed at this time for mobility.  PT continues to recommend IP rehab to address deficits.  PPE donned: N95 mask, surgical mask, faceshield, gown, gloves, hair bouffant, shoe booties.     Time Calculation:   PT Charges     Row Name 09/02/20 1622             Time Calculation    Start Time  1411  -HC      Stop Time  1435  -      Time Calculation (min)  24 min  -      PT Received On  09/02/20  -      PT - Next Appointment  09/04/20  -         Time Calculation- PT    Total Timed Code Minutes- PT  24 minute(s)  -        User Key  (r) = Recorded By, (t) = Taken By, (c) = Cosigned By    Initials Name Provider Type     Corinne He, PT Physical Therapist        Therapy Charges for Today     Code Description Service Date Service Provider Modifiers Qty    [id number]  PT NEUROMUSC RE EDUCATION EA 15 MIN 9/2/2020 Corinne He, PT GP 1    [id number]  PT THERAPEUTIC ACT EA 15 MIN  9/2/2020 Corinne He, PT GP 1          PT G-Codes  Outcome Measure Options: AM-PAC 6 Clicks Basic Mobility (PT), Modified [place]  AM-PAC 6 Clicks Score (PT): 9  Modified [place] Scale: 5 - Severe disability.  Bedridden, incontinent, and requiring constant nursing care and attention.    Corinne He, PT  9/2/2020

## 2020-09-02 NOTE — PROGRESS NOTES
PATIENT'S RECONSIDERATION WITH [facility] IS STILL PENDING AT THIS TIME.    PATIENT CANNOT BE DISCHARGED WHILE THIS APPEAL IS ACTIVE UNLESS THE PATIENT/FAMILY CALL TO RESCIND THE APPEAL.       NIYA PHONE:  [phone]    PARMINDER GRIFFIN  [address]  [address]  Ph: [phone]  Fx: [phone]

## 2020-09-02 NOTE — PLAN OF CARE
Problem: Patient Care Overview  Goal: Plan of Care Review  Outcome: Ongoing (interventions implemented as appropriate)  Flowsheets (Taken 9/2/2020 2046)  Progress: no change  Plan of Care Reviewed With: patient  Outcome Summary: Pt continues to exhibit significant weakness requiring modA for bed mobility and transfers.  Pt willing to attempt two sit <-> stand transfers with modA using quad cane x15-20 seconds before needing seated rest break.  Pivot transfer not attempted due to weakness for pt safety.  Pt unable to shift weight to initiate step using quad cane.  Pt remains far from mobility baseline and not safe to return home alone due to level of assist needed at this time for mobility.  PT continues to recommend IP rehab to address deficits.  PPE donned: N95 mask, surgical mask, faceshield, gown, gloves, hair bouffant, shoe booties.

## 2020-09-02 NOTE — PLAN OF CARE
Problem: Patient Care Overview  Goal: Plan of Care Review  Outcome: Ongoing (interventions implemented as appropriate)  Flowsheets (Taken 9/2/2020 1230)  Progress: improving  Outcome Summary: Patient is eager to discharge home. Waiting on CM to update on pt's home health situation. Pt is alert and oriented x 4 and able to make his needs known. Per pt he wishes to discharge to his apartment with home health.

## 2020-09-02 NOTE — PROGRESS NOTES
[facility] Medicine Services Daily Progress Note      Hospitalist Team  LOS 9 days      Patient Care Team:  Kelly Gill DO as PCP - General (Physical Medicine and Rehabilitation)    Patient Location: 203/1      Subjective   Subjective   No complaints or events overnight.  Chief Complaint / Subjective  Chief Complaint   Patient presents with   • Weakness - Generalized         Brief Synopsis of Hospital Course/HPI  70-year-old man with history of CAD, dementia, CVA with left-sided residual weakness ,? CHF and hypertension.  According to admission note and talking to his daughter--> he was recently treated at [facility] for STEMI and rhabdomyolysis in the setting of acute kidney injury after a mechanical fall.  He was subsequently discharged to rehab facility for continued physical therapy. Apparently, he was discharged from rehab on 8/22/2020.  He presented to [facility] ED with  AMS/encephalopathy.  He endorses  mild chest discomfort  shortness of breath with scant  productive cough.  No reported fever, chills, no night sweats, and no abdominal pain nausea or vomiting.  On arrival to [facility], he was screened and tested positive for COVID-19 and subsequently placed on isolation.  Chest x-ray revealed patchy peripheral airspace opacity concerning for bilateral multifocal pneumonia.      Review of record here at [facility] showed multiple testing here on 8/14 and 8/15 which were negative.  However, he had another testing done on 8/23/2020 which was positive.   Otherwise, he remained clinically stable and oxygenating well on room air. Laboratory showed static, nonzero slight evaded troponin and EKG showed sinus rhythm with RBBB.       He was evaluated by cardiologist and ID.  Patient does not require Remdisivir with stable oxygenation on RA.  He was  maintained on isolation and treated with IV antibiotic with Zosyn, and later switched to  Augmentin for COVID  "related pneumonia possible secondary bacterial infection.  The patient was felt to be stable for discharge however his daughter appealed to Medicare per nursing staff reporting that the patient's daughter wanted the patient's COVID test to be negative prior to discharge.     8/31/2020: The patient denies current complaints.  He is anxious to go home and is asking when his COVID test will be repeated.    9/1/2020: Patient denies current complaints except for feeling cold and asking for his blankets.  I spoke with the patient's daughter Maureen.  She is requesting repeat COVID testing so that if the patient is negative he will have more options for inpatient rehab since only one facility locally is taking COVID positive patients.  The COVID test was ordered resulted as positive.  Patient's daughter was notified.  9/2/2020: Patient denies c/o except that his bed is not comfortable.  He is anxious to go home.  He was counseled on the difficulties of sending him home when it is not safe for him to be there alone.     Review of systems:  12 point review of systems was reviewed and was negative except as above.    Objective      Objective      Vital Signs  Temp:  [96.2 °F (35.7 °C)-96.8 °F (36 °C)] 96.2 °F (35.7 °C)  Heart Rate:  [] 65  Resp:  [17-18] 17  BP: (130-150)/(84-90) 150/90  Oxygen Therapy  SpO2: 93 %  Pulse Oximetry Type: Continuous  Device (Oxygen Therapy): room air  Device (Oxygen Therapy): room air  Flow (L/min): 2  Flowsheet Rows      First Filed Value   Admission Height  182.9 cm (72\") Documented at 08/23/2020 0314   Admission Weight  78 kg (171 lb 15.3 oz) Documented at 08/23/2020 0314        Intake & Output (last 3 days)       08/30 0701 - 08/31 0700 08/31 0701 - 09/01 0700 09/01 0701 - 09/02 0700 09/02 0701 - 09/03 0700    P.O. 1200 480 480 240    I.V. (mL/kg)        Total Intake(mL/kg) 1200 (13.6) 480 (5.4) 480 (5.4) 240 (2.7)    Urine (mL/kg/hr) 600 (0.3) 500 (0.2) 100 (0)     Stool        Total " Output 600 500 100     Net +600 -20 +380 +240            Urine Unmeasured Occurrence 1 x  1 x         Lines, Drains & Airways    Active LDAs     Name:   Placement date:   Placement time:   Site:   Days:    Peripheral IV 08/27/20 1717 Right Antecubital   08/27/20 1717    Antecubital   1              Physical Exam exam unchanged from 9/1/2020  Well-developed well-nourished in no acute distress sitting up in bed awake and alert; mucous membranes moist; sclerae anicteric; lungs clear to auscultation bilaterally; CV regular rate and rhythm; abdomen soft nontender nondistended with active bowel sounds; extremities with no edema, cyanosis or calf tenderness; palpable pedal pulses bilaterally; no Mancini catheter.        Procedures:    Results Review:     I reviewed the patient's new clinical results.      Lab Results (last 24 hours)     ** No results found for the last 24 hours. **        No results found for: HGBA1C            No results found for: LIPASE  No results found for: CHOL, CHLPL, TRIG, HDL, LDL, LDLDIRECT    No results found for: INTRAOP, PREDX, FINALDX, COMDX    Microbiology Results (last 10 days)     Procedure Component Value - Date/Time    Respiratory Panel PCR w/COVID-19(SARS-CoV-2) MAIRA/NANDINI/ANA/PAD/COR/MAD In-House, NP Swab in UTM/VTM, 3-4 HR TAT - Swab, Nasopharynx [[id number]]  (Abnormal) Collected:  09/01/20 1424    Lab Status:  Final result Specimen:  Swab from Nasopharynx Updated:  09/01/20 1558     ADENOVIRUS, PCR Not Detected     Coronavirus 229E Not Detected     Coronavirus HKU1 Not Detected     Coronavirus NL63 Not Detected     Coronavirus OC43 Not Detected     COVID19 Detected     Human Metapneumovirus Not Detected     Human Rhinovirus/Enterovirus Not Detected     Influenza A PCR Not Detected     Influenza A H1 Not Detected     Influenza A H1 2009 PCR Not Detected     Influenza A H3 Not Detected     Influenza B PCR Not Detected     Parainfluenza Virus 1 Not Detected     Parainfluenza Virus 2 Not  Detected     Parainfluenza Virus 3 Not Detected     Parainfluenza Virus 4 Not Detected     RSV, PCR Not Detected     Bordetella pertussis pcr Not Detected     Bordetella parapertussis PCR Not Detected     Chlamydophila pneumoniae PCR Not Detected     Mycoplasma pneumo by PCR Not Detected    Narrative:       Fact sheet for providers: https://[employer]/wp-content/uploads/XAZ5905-3583-WD1.1-EUA-Provider-Fact-Sheet-3.pdf    Fact sheet for patients: https://[employer]/wp-content/uploads/ZGN5167-4051-QV2.1-EUA-Patient-Fact-Sheet-1.pdf    Respiratory Panel PCR w/COVID-19(SARS-CoV-2) MAIRA/NANDINI/ANA/PAD In-House, NP Swab in UTM/VTM, 3-4 HR TAT - Swab, Nasopharynx [[id number]]  (Abnormal) Collected:  08/24/20 2054    Lab Status:  Final result Specimen:  Swab from Nasopharynx Updated:  08/24/20 2329     ADENOVIRUS, PCR Not Detected     Coronavirus 229E Not Detected     Coronavirus HKU1 Not Detected     Coronavirus NL63 Not Detected     Coronavirus OC43 Not Detected     COVID19 Detected     Human Metapneumovirus Not Detected     Human Rhinovirus/Enterovirus Not Detected     Influenza A PCR Not Detected     Influenza A H1 Not Detected     Influenza A H1 2009 PCR Not Detected     Influenza A H3 Not Detected     Influenza B PCR Not Detected     Parainfluenza Virus 1 Not Detected     Parainfluenza Virus 2 Not Detected     Parainfluenza Virus 3 Not Detected     Parainfluenza Virus 4 Not Detected     RSV, PCR Not Detected     Bordetella pertussis pcr Not Detected     Bordetella parapertussis PCR Not Detected     Chlamydophila pneumoniae PCR Not Detected     Mycoplasma pneumo by PCR Not Detected    Narrative:       Fact sheet for providers: https://[employer])/wp-content/uploads/EVC8023-7479-OK6.1-EUA-Provider-Fact-Sheet-3.pdf    Fact sheet for patients: https://[employer]/wp-content/uploads/ZOH4241-5875-AV0.1-EUA-Patient-Fact-Sheet-1.pdf          ECG/EMG Results (most recent)     Procedure Component Value  Units Date/Time    ECG 12 Lead [[id number]] Collected:  08/23/20 0619     Updated:  08/23/20 0621    Narrative:       HEART RATE= 91  bpm  RR Interval= 660  ms  MD Interval= 163  ms  P Horizontal Axis= 24  deg  P Front Axis= 64  deg  QRSD Interval= 152  ms  QT Interval= 382  ms  QRS Axis= -83  deg  T Wave Axis= -32  deg  - ABNORMAL ECG -  Sinus rhythm  RBBB and LAFB  Probable lateral infarct, age indeterminate  Electronically Signed By:   Date and Time of Study: 2020-08-23 06:19:41    ECG 12 Lead [[id number]] Collected:  08/26/20 1325     Updated:  08/31/20 2032    Narrative:       HEART RATE= 98  bpm  RR Interval= 612  ms  MD Interval= 163  ms  P Horizontal Axis= 40  deg  P Front Axis= 60  deg  QRSD Interval= 154  ms  QT Interval= 396  ms  QRS Axis= 258  deg  T Wave Axis= -53  deg  - ABNORMAL ECG -  Sinus rhythm  Ventricular premature complex  Inferior infarct, old  When compared with ECG of 23-Aug-2020 6:19:41,  No significant change  Electronically Signed By: Jorge Chacon (ANA) 31-Aug-2020 20:08:03  Date and Time of Study: 2020-08-26 13:25:06                    Xr Chest 1 View    Result Date: 8/28/2020  The lungs consistent with multifocal pneumonia, without significant change from 08/25/2020.  Electronically Signed By-Dr. Alexus Mejias MD On:8/28/2020 11:03 AM This report was finalized on [id number] by Dr. Alexus Mejias MD.          Xrays, labs reviewed personally by physician.    Medication Review:   I have reviewed the patient's current medication list      Scheduled Meds    amLODIPine 10 mg Oral Daily   cetirizine 5 mg Oral Daily   clopidogrel 75 mg Oral Daily   enoxaparin 40 mg Subcutaneous Q24H   finasteride 5 mg Oral Daily   hydrALAZINE 50 mg Oral BID   levETIRAcetam 500 mg Oral BID   rosuvastatin 20 mg Oral Daily   sodium chloride 10 mL Intravenous Q12H   tamsulosin 0.8 mg Oral Daily   traZODone 50 mg Oral Nightly       Meds Infusions    sodium chloride 100 mL/hr Last Rate: 100 mL/hr (08/30/20  0631)       Meds PRN  •  acetaminophen **OR** acetaminophen **OR** acetaminophen  •  benzonatate  •  calcium carbonate  •  melatonin  •  ondansetron **OR** ondansetron  •  potassium chloride  •  potassium chloride  •  sodium chloride        Assessment/Plan   Assessment/Plan     Active Hospital Problems    Diagnosis  POA   • **Pneumonia due to COVID-19 virus [U07.1, J12.89]  Unknown   • Delirium [R41.0]  Yes   • Weakness [R53.1]  Yes      Resolved Hospital Problems   No resolved problems to display.       MEDICAL DECISION MAKING COMPLEXITY BY PROBLEM:   Acute COVID-19 pneumonia with possible secondary bacterial infection        -oxygenating well on room air          -completed IV Zosyn followed by Augmentin    Coronary disease, recent non-ST elevation MI         -on Amlodipine/Plavix/hydralazine/Crestor             Essential hypertension, chronic and controlled         -Continue amlodipine /hydralazine      BPH       -Continue Proscar/Flomax     History of CVA with residual left-sided hemiparesis        -Continue Plavix/Crestor, and bedside range of motion as tolerated     Disposition:  He was discharged 8/28/2020, however, daughter contested discharge.        VTE Prophylaxis -   Mechanical Order History:     None      Pharmalogical Order History:     Ordered     Dose Route Frequency Stop    08/23/20 0559  enoxaparin (LOVENOX) syringe 40 mg      40 mg SC Every 24 Hours --            Code Status -   Code Status and Medical Interventions:   Ordered at: 08/23/20 0559     Code Status:    CPR     Medical Interventions (Level of Support Prior to Arrest):    Full       Discharge Planning  Pending discharge disposition plans.      Electronically signed by Tessie Brenner MD, 09/02/20, 18:59.  [facility] Hospitalist Team

## 2020-09-02 NOTE — PROGRESS NOTES
Continued Stay Note  [facility]     Patient Name: Ivan Maradiaga  MRN: [id number]  Today's Date: 9/2/2020    Admit Date: 8/23/2020    Discharge Plan     Row Name 09/02/20 1537       Plan    Plan  Spoke with Maureen Donald per phone. NIYA Saleh [phone] ext [phone] had called and requested that I call Maureen because she was very angry regarding possible DC of her father.  Discussed the process that the 2nd appeal was still pending.  Pt was DCed from hospital on the 28th Friday. The appeal will determine if the DC is upheld or overturned. If upheld pt will need to DC.  Reviewed the COVID positive test dates this admission. Reviewed facilities that accept COVID positive pts in [facility] and [place]. There are 3. Maureen has seen them and rejected all, [facility], [facility] and [facility].  Maureen aware [facility]. [facility]  may need to be utilized as another choice if [facility] not accepted.               Expected Discharge Date and Time     Expected Discharge Date Expected Discharge Time    Aug 28, 2020             Karuna Garza RN

## 2020-09-03 PROCEDURE — 99232 SBSQ HOSP IP/OBS MODERATE 35: CPT | Performed by: NURSE PRACTITIONER

## 2020-09-03 PROCEDURE — 99231 SBSQ HOSP IP/OBS SF/LOW 25: CPT | Performed by: HOSPITALIST

## 2020-09-03 PROCEDURE — 97535 SELF CARE MNGMENT TRAINING: CPT

## 2020-09-03 PROCEDURE — 25010000002 ENOXAPARIN PER 10 MG: Performed by: NURSE PRACTITIONER

## 2020-09-03 PROCEDURE — 97530 THERAPEUTIC ACTIVITIES: CPT

## 2020-09-03 RX ADMIN — CLOPIDOGREL BISULFATE 75 MG: 75 TABLET ORAL at 08:14

## 2020-09-03 RX ADMIN — CETIRIZINE HYDROCHLORIDE 5 MG: 10 TABLET, FILM COATED ORAL at 08:14

## 2020-09-03 RX ADMIN — Medication 10 ML: at 08:15

## 2020-09-03 RX ADMIN — LEVETIRACETAM 500 MG: 500 TABLET ORAL at 08:14

## 2020-09-03 RX ADMIN — AMLODIPINE BESYLATE 10 MG: 5 TABLET ORAL at 08:14

## 2020-09-03 RX ADMIN — TRAZODONE HYDROCHLORIDE 50 MG: 50 TABLET ORAL at 21:12

## 2020-09-03 RX ADMIN — ROSUVASTATIN CALCIUM 20 MG: 10 TABLET, FILM COATED ORAL at 21:12

## 2020-09-03 RX ADMIN — HYDRALAZINE HYDROCHLORIDE 50 MG: 25 TABLET, FILM COATED ORAL at 21:11

## 2020-09-03 RX ADMIN — TAMSULOSIN HYDROCHLORIDE 0.8 MG: 0.4 CAPSULE ORAL at 08:13

## 2020-09-03 RX ADMIN — LEVETIRACETAM 500 MG: 500 TABLET ORAL at 21:12

## 2020-09-03 RX ADMIN — ENOXAPARIN SODIUM 40 MG: 40 INJECTION SUBCUTANEOUS at 15:04

## 2020-09-03 RX ADMIN — FINASTERIDE 5 MG: 5 TABLET, FILM COATED ORAL at 08:14

## 2020-09-03 RX ADMIN — Medication 5 MG: at 21:12

## 2020-09-03 RX ADMIN — HYDRALAZINE HYDROCHLORIDE 50 MG: 25 TABLET, FILM COATED ORAL at 08:15

## 2020-09-03 RX ADMIN — Medication 10 ML: at 21:12

## 2020-09-03 NOTE — PLAN OF CARE
Problem: Patient Care Overview  Goal: Plan of Care Review  Flowsheets  Taken 9/3/2020 1012  Progress: declining  Plan of Care Reviewed With: patient  Taken 9/3/2020 0900  Outcome Summary: Pt strength for transfers is decreasing as he deconditions. His vital signs are stable though he reports severe fatigue after attempts to stand EOB for transfer. Demonstrates signs of kinesiophobia. Lives alone @ baseline w/ CG 4 days/wk, but at baseline is able to walk 10' w/ quad cane daily to access his motor w/c. Pt has Hx  of demential. Is A&O X4, but demo signs of memory impairment & executive functioning deficits. Nathan not be safe at home. Needs IP rehab, [facility] for extended period of time. PPW worn per COVID protocol.

## 2020-09-03 NOTE — PLAN OF CARE
Problem: Patient Care Overview  Goal: Plan of Care Review  Outcome: Ongoing (interventions implemented as appropriate)  Flowsheets  Taken 9/3/2020 1435  Progress: no change  Outcome Summary: Pt is on RA, not requiring oxygen. Pt is very attention seeking and presses on call light constantly. Pt educated on call light use and clustering care. Will continue to monitor.  Taken 9/3/2020 1100  Plan of Care Reviewed With: patient

## 2020-09-03 NOTE — PROGRESS NOTES
[facility] Medicine Services Daily Progress Note      Hospitalist Team  LOS 10 days      Patient Care Team:  Kelly Gill DO as PCP - General (Physical Medicine and Rehabilitation)    Patient Location: 203/1      Subjective   Subjective     Chief Complaint / Subjective  Chief Complaint   Patient presents with   • Weakness - Generalized         Brief Synopsis of Hospital Course/HPI  70-year-old man with history of CAD, dementia, CVA with left-sided residual weakness ,? CHF and hypertension.  According to admission note and talking to his daughter--> he was recently treated at [facility] for STEMI and rhabdomyolysis in the setting of acute kidney injury after a mechanical fall.  He was subsequently discharged to rehab facility for continued physical therapy. Apparently, he was discharged from rehab on 8/22/2020.  He presented to [facility] ED with  AMS/encephalopathy.  He endorses  mild chest discomfort  shortness of breath with scant  productive cough.  No reported fever, chills, no night sweats, and no abdominal pain nausea or vomiting.  On arrival to [facility], he was screened and tested positive for COVID-19 and subsequently placed on isolation.  Chest x-ray revealed patchy peripheral airspace opacity concerning for bilateral multifocal pneumonia.      Review of record here at [facility] showed multiple testing here on 8/14 and 8/15 which were negative.  However, he had another testing done on 8/23/2020 which was positive.   Otherwise, he remained clinically stable and oxygenating well on room air. Laboratory showed static, nonzero slight evaded troponin and EKG showed sinus rhythm with RBBB.        He was evaluated by cardiologist and ID.  Patient does not require Remdisivir with stable oxygenation on RA.  He was  maintained on isolation and treated with IV antibiotic with Zosyn, and later switched to  Augmentin for COVID related pneumonia possible secondary  "bacterial infection.  The patient was felt to be stable for discharge however his daughter appealed to Medicare per nursing staff reporting that the patient's daughter wanted the patient's COVID test to be negative prior to discharge.     8/31/2020: The patient denies current complaints.  He is anxious to go home and is asking when his COVID test will be repeated.    9/1/2020: Patient denies current complaints except for feeling cold and asking for his blankets.  I spoke with the patient's daughter Maureen.  She is requesting repeat COVID testing so that if the patient is negative he will have more options for inpatient rehab since only one facility locally is taking COVID positive patients.  The COVID test was ordered resulted as positive.  Patient's daughter was notified.  9/2/2020: Patient denies c/o except that his bed is not comfortable.  He is anxious to go home.  He was counseled on the difficulties of sending him home when it is not safe for him to be there alone.   9/3/2020: Patient reports feeling cold but otherwise denies current complaints.     Review of systems:  12 point review of systems was reviewed and was negative except as above.    ROS  12 point review of systems was reviewed and was negative except as above.    Objective   Objective      Vital Signs  Temp:  [94.2 °F (34.6 °C)-96.4 °F (35.8 °C)] 96.1 °F (35.6 °C)  Heart Rate:  [52-68] 57  Resp:  [12-20] 12  BP: (126-164)/(76-90) 164/89  Oxygen Therapy  SpO2: 96 %  Pulse Oximetry Type: Continuous  Device (Oxygen Therapy): room air  Device (Oxygen Therapy): room air  Flow (L/min): 2  Flowsheet Rows      First Filed Value   Admission Height  182.9 cm (72\") Documented at 08/23/2020 0314   Admission Weight  78 kg (171 lb 15.3 oz) Documented at 08/23/2020 0314        Intake & Output (last 3 days)       08/31 0701 - 09/01 0700 09/01 0701 - 09/02 0700 09/02 0701 - 09/03 0700 09/03 0701 - 09/04 0700    P.O. 480 480 480     Total Intake(mL/kg) 480 (5.4) 480 " (5.4) 480 (5.4)     Urine (mL/kg/hr) 500 (0.2) 100 (0) 100 (0)     Total Output 500 100 100     Net -20 +380 +380             Urine Unmeasured Occurrence  1 x          Lines, Drains & Airways    Active LDAs     Name:   Placement date:   Placement time:   Site:   Days:    Peripheral IV 08/29/20 Posterior;Right Forearm   08/29/20    --    Forearm   5                  Physical Exam:    Physical Exam  Well-developed well-nourished elderly gentleman comfortable on room air in no acute distress; sclera anicteric, mucous membranes moist; extremities with no edema or calf tenderness.  No Mancini catheter.      Procedures:              Results Review:     I reviewed the patient's new clinical results.      Lab Results (last 24 hours)     ** No results found for the last 24 hours. **        No results found for: HGBA1C            No results found for: LIPASE  No results found for: CHOL, CHLPL, TRIG, HDL, LDL, LDLDIRECT    No results found for: INTRAOP, PREDX, FINALDX, COMDX    Microbiology Results (last 10 days)     Procedure Component Value - Date/Time    Respiratory Panel PCR w/COVID-19(SARS-CoV-2) MAIRA/NANDINI/ANA/PAD/COR/MAD In-House, NP Swab in UTM/VTM, 3-4 HR TAT - Swab, Nasopharynx [[id number]]  (Abnormal) Collected:  09/01/20 1424    Lab Status:  Final result Specimen:  Swab from Nasopharynx Updated:  09/01/20 1558     ADENOVIRUS, PCR Not Detected     Coronavirus 229E Not Detected     Coronavirus HKU1 Not Detected     Coronavirus NL63 Not Detected     Coronavirus OC43 Not Detected     COVID19 Detected     Human Metapneumovirus Not Detected     Human Rhinovirus/Enterovirus Not Detected     Influenza A PCR Not Detected     Influenza A H1 Not Detected     Influenza A H1 2009 PCR Not Detected     Influenza A H3 Not Detected     Influenza B PCR Not Detected     Parainfluenza Virus 1 Not Detected     Parainfluenza Virus 2 Not Detected     Parainfluenza Virus 3 Not Detected     Parainfluenza Virus 4 Not Detected     RSV, PCR Not  Detected     Bordetella pertussis pcr Not Detected     Bordetella parapertussis PCR Not Detected     Chlamydophila pneumoniae PCR Not Detected     Mycoplasma pneumo by PCR Not Detected    Narrative:       Fact sheet for providers: https://Famo.uss.Webjam/wp-content/uploads/IUS7278-6608-LM9.1-EUA-Provider-Fact-Sheet-3.pdf    Fact sheet for patients: https://LinkCycle/wp-content/uploads/UOU8516-1253-HD5.1-EUA-Patient-Fact-Sheet-1.pdf    Respiratory Panel PCR w/COVID-19(SARS-CoV-2) MAIRA/NANDINI/ANA/PAD In-House, NP Swab in UTM/VTM, 3-4 HR TAT - Swab, Nasopharynx [[id number]]  (Abnormal) Collected:  08/24/20 2054    Lab Status:  Final result Specimen:  Swab from Nasopharynx Updated:  08/24/20 2329     ADENOVIRUS, PCR Not Detected     Coronavirus 229E Not Detected     Coronavirus HKU1 Not Detected     Coronavirus NL63 Not Detected     Coronavirus OC43 Not Detected     COVID19 Detected     Human Metapneumovirus Not Detected     Human Rhinovirus/Enterovirus Not Detected     Influenza A PCR Not Detected     Influenza A H1 Not Detected     Influenza A H1 2009 PCR Not Detected     Influenza A H3 Not Detected     Influenza B PCR Not Detected     Parainfluenza Virus 1 Not Detected     Parainfluenza Virus 2 Not Detected     Parainfluenza Virus 3 Not Detected     Parainfluenza Virus 4 Not Detected     RSV, PCR Not Detected     Bordetella pertussis pcr Not Detected     Bordetella parapertussis PCR Not Detected     Chlamydophila pneumoniae PCR Not Detected     Mycoplasma pneumo by PCR Not Detected    Narrative:       Fact sheet for providers: https://docs.Webjam/wp-content/uploads/WJE0606-1296-CT8.1-EUA-Provider-Fact-Sheet-3.pdf    Fact sheet for patients: https://LinkCycle/wp-content/uploads/GYW1264-7043-SL1.1-EUA-Patient-Fact-Sheet-1.pdf          ECG/EMG Results (most recent)     Procedure Component Value Units Date/Time    ECG 12 Lead [[id number]] Collected:  08/23/20 0619     Updated:  08/23/20 0621     Narrative:       HEART RATE= 91  bpm  RR Interval= 660  ms  MT Interval= 163  ms  P Horizontal Axis= 24  deg  P Front Axis= 64  deg  QRSD Interval= 152  ms  QT Interval= 382  ms  QRS Axis= -83  deg  T Wave Axis= -32  deg  - ABNORMAL ECG -  Sinus rhythm  RBBB and LAFB  Probable lateral infarct, age indeterminate  Electronically Signed By:   Date and Time of Study: 2020-08-23 06:19:41    ECG 12 Lead [[id number]] Collected:  08/26/20 1325     Updated:  08/31/20 2032    Narrative:       HEART RATE= 98  bpm  RR Interval= 612  ms  MT Interval= 163  ms  P Horizontal Axis= 40  deg  P Front Axis= 60  deg  QRSD Interval= 154  ms  QT Interval= 396  ms  QRS Axis= 258  deg  T Wave Axis= -53  deg  - ABNORMAL ECG -  Sinus rhythm  Ventricular premature complex  Inferior infarct, old  When compared with ECG of 23-Aug-2020 6:19:41,  No significant change  Electronically Signed By: Jorge Chacon (ANA) 31-Aug-2020 20:08:03  Date and Time of Study: 2020-08-26 13:25:06                    Xr Chest 1 View    Result Date: 8/28/2020  The lungs consistent with multifocal pneumonia, without significant change from 08/25/2020.  Electronically Signed By-Dr. Alexus Mejias MD On:8/28/2020 11:03 AM This report was finalized on [id number] by Dr. Alexus Mejias MD.          Xrays, labs reviewed personally by physician.    Medication Review:   I have reviewed the patient's current medication list      Scheduled Meds    amLODIPine 10 mg Oral Daily   cetirizine 5 mg Oral Daily   clopidogrel 75 mg Oral Daily   enoxaparin 40 mg Subcutaneous Q24H   finasteride 5 mg Oral Daily   hydrALAZINE 50 mg Oral BID   levETIRAcetam 500 mg Oral BID   rosuvastatin 20 mg Oral Daily   sodium chloride 10 mL Intravenous Q12H   tamsulosin 0.8 mg Oral Daily   traZODone 50 mg Oral Nightly       Meds Infusions    sodium chloride 100 mL/hr Last Rate: 100 mL/hr (08/30/20 0631)       Meds PRN  •  acetaminophen **OR** acetaminophen **OR** acetaminophen  •  benzonatate  •   calcium carbonate  •  melatonin  •  ondansetron **OR** ondansetron  •  potassium chloride  •  potassium chloride  •  sodium chloride        Assessment/Plan   Assessment/Plan     Active Hospital Problems    Diagnosis  POA   • **Pneumonia due to COVID-19 virus [U07.1, J12.89]  Unknown   • Delirium [R41.0]  Yes   • Weakness [R53.1]  Yes      Resolved Hospital Problems   No resolved problems to display.       MEDICAL DECISION MAKING COMPLEXITY BY PROBLEM:     Assessment and plan:  Acute COVID-19 pneumonia with possible secondary bacterial infection        -oxygenating well on room air          -completed IV Zosyn followed by Augmentin     Coronary disease, recent non-ST elevation MI         -on Amlodipine/Plavix/hydralazine/Crestor             Essential hypertension, chronic and controlled         -Continue amlodipine /hydralazine      BPH       -Continue Proscar/Flomax     History of CVA with residual left-sided hemiparesis        -Continue Plavix/Crestor, and bedside range of motion as tolerated     Disposition:  He was discharged 8/28/2020, however, daughter contested discharge which was upheld and then she appealed the denial.        VTE Prophylaxis -   Mechanical Order History:     None      Pharmalogical Order History:     Ordered     Dose Route Frequency Stop    08/23/20 0559  enoxaparin (LOVENOX) syringe 40 mg      40 mg SC Every 24 Hours --            Code Status -   Code Status and Medical Interventions:   Ordered at: 08/23/20 0559     Code Status:    CPR     Medical Interventions (Level of Support Prior to Arrest):    Full       This patient has been examined wearing appropriate Personal Protective Equipment and discussed with Care coordinator Melisa Jones. 09/03/20        Discharge Planning  Pending discharge appeal outcome.          Electronically signed by Tessie Brenner MD, 09/03/20, 10:46.  [facility] Hospital[facility]

## 2020-09-03 NOTE — PLAN OF CARE
Patient resting abed, no complaints voiced. During the beginning of shift he used his call light multiple times. He talked about wanting to go home, he asked about rehab. We spoke about rehab and why he is still at the hospital. Will continue to monitor.

## 2020-09-03 NOTE — PROGRESS NOTES
Received call from Kathie at [facility] stating that patient won appeal on reconsideration and that services will continue without liability until further notice.  Did not receive any kind of indication on what needed to be reviewed or changed, and have not received any written notice with this information.  Left a message for clarification so we may get this information we usually receive on the determination call.  [facility] phone [phone].  Karuna Garza informed, and Manager Regina Thompson informed.    Melisa Jones  Utilization Review Coordinator  [address]  [address]  Ph: [phone]  Fx: [phone]

## 2020-09-03 NOTE — PROGRESS NOTES
Cardiology Progress Note    Patient Identification:  Name: Ivan Maradiaga  Age: 70 y.o.  Sex: male  :  1950  MRN: [id number]                 Follow Up / Chief Complaint:   Chief Complaint   Patient presents with   • Weakness - Generalized       Interval History:  COVID-19 +,  Mildly elevated troponins trended down Remains COVID + on 2020 awaiting discharge       Subjective:  No chest pain or shortness of breath      Objective:  Lying in bed no distress noted        History of Present Illness:       This is a 70 year old male with past medical history of      - CAD, recent non ST elevation MI  - ? CHF  - CVA with left sided weakness   - Hypertension, hyperlipidemia  - Dementia   - allergy to sulfa     Who presented to the ED via EMS.  Apparently [facility], PAUL, rhabdomyolysis and Non STEMI.  Patient was discharge to rehab and discharged home on 20, however he continues to be weak and had called EMS several times in which he finally came to the ED.  Patient is still COVID positive.   Patient is a poor historian and information was taken from chart review.  Patient labs reviewed and show minimally elevated troponin at 0.044 with elevated bun/cr 27/1.26.  EKG showed RBBB and non specific ST abnormalities.  K 3.4 today   Patient apparently had echocardiogram showing hypokinesis in the posterior and lateral walls [facility].   Patient denies chest pain to me.  He is mildy short of breath.  No other complaints      Assessment:    Elevated troponin, likely demand ischemia from renal dysfunction   COVID-19 infection   History of CAD  History of CVA  Hypertension  Hyperlipidemia   Renal insufficiency   hypokalemia     Recommendations / Plan:      Troponin 0.044 initially continues to trend down  Continue to monitor telemetry  EKG RBBB  Monitor renal function and electrolytes   Last K check on  3.4-  Will recheck K level tomorrow and replace as needed    Patient is already on medical management with plavix and statin   Will need outpatient follow up with cardiology   Encouraged patient to get up and move around as tolerated with assistance.     Discussed with patient and nursing staff  Will sign off and follow up as needed     Patient was apparently discharged and family appealed discharge.  Patient needs rehab vs home health         Past Medical History:  Past Medical History:   Diagnosis Date   • CAD (coronary artery disease)    • CHF (congestive heart failure) (CMS/HCC)    • Dementia (CMS/HCC)    • Hypertension      Past Surgical History:  History reviewed. No pertinent surgical history.     Social History:   Social History     Tobacco Use   • Smoking status: Former Smoker   Substance Use Topics   • Alcohol use: Never     Frequency: Never      Family History:  Family History   Problem Relation Age of Onset   • No Known Problems Mother    • No Known Problems Father           Allergies:  Allergies   Allergen Reactions   • Sulfa Antibiotics Unknown - High Severity     Scheduled Meds:    amLODIPine 10 mg Daily   cetirizine 5 mg Daily   clopidogrel 75 mg Daily   enoxaparin 40 mg Q24H   finasteride 5 mg Daily   hydrALAZINE 50 mg BID   levETIRAcetam 500 mg BID   rosuvastatin 20 mg Daily   sodium chloride 10 mL Q12H   tamsulosin 0.8 mg Daily   traZODone 50 mg Nightly           INTAKE AND OUTPUT:    Intake/Output Summary (Last 24 hours) at 9/3/2020 1139  Last data filed at 9/2/2020 2019  Gross per 24 hour   Intake 240 ml   Output 100 ml   Net 140 ml       Review of Systems:   Review of Systems   Cardiovascular: Negative for chest pain.   Respiratory: Negative for shortness of breath.          Constitutional:  Temp:  [94.2 °F (34.6 °C)-96.4 °F (35.8 °C)] 96.1 °F (35.6 °C)  Heart Rate:  [52-68] 57  Resp:  [12-20] 12  BP: (126-164)/(76-90) 164/89    Physical Exam   Exam by inspection only today     /89 (BP Location: Right arm, Patient Position: Lying)   Pulse 57    "Temp 96.1 °F (35.6 °C) (Oral)   Resp 12   Ht 188 cm (74\")   Wt 88.5 kg (195 lb 1.7 oz)   SpO2 96%   BMI 25.05 kg/m²   General:  Appears in no acute distress  On room air  Respiratory: Respirations regular and unlabored at rest.   Cardiovascular: Regular rate on monitor  Musculoskeletal:  No abnormal movements  Skin: Color pink.   Neuro: Alert and awake       Cardiographics  Telemetry: RBBB;  Sinus rhythm       ECG:   ECG 12 Lead   Final Result   HEART RATE= 98  bpm   RR Interval= 612  ms   VA Interval= 163  ms   P Horizontal Axis= 40  deg   P Front Axis= 60  deg   QRSD Interval= 154  ms   QT Interval= 396  ms   QRS Axis= 258  deg   T Wave Axis= -53  deg   - ABNORMAL ECG -   Sinus rhythm   Ventricular premature complex   Inferior infarct, old   When compared with ECG of 23-Aug-2020 6:19:41,   No significant change   Electronically Signed By: Jorge Chacon (ANA) 31-Aug-2020 20:08:03   Date and Time of Study: 2020-08-26 13:25:06      ECG 12 Lead   Preliminary Result   HEART RATE= 91  bpm   RR Interval= 660  ms   VA Interval= 163  ms   P Horizontal Axis= 24  deg   P Front Axis= 64  deg   QRSD Interval= 152  ms   QT Interval= 382  ms   QRS Axis= -83  deg   T Wave Axis= -32  deg   - ABNORMAL ECG -   Sinus rhythm   RBBB and LAFB   Probable lateral infarct, age indeterminate   Electronically Signed By:    Date and Time of Study: 2020-08-23 06:19:41        I have personally reviewed EKG    Echocardiogram:      Lab Review   I have reviewed the labs          Results from last 7 days   Lab Units 08/29/20  1604 08/29/20  0336   SODIUM mmol/L  --  138   POTASSIUM mmol/L 3.9 3.4*   BUN   --  15   CREATININE mg/dL  --  0.82   CALCIUM mg/dL  --  8.3*         Results from last 7 days   Lab Units 08/29/20  0336   WBC 10*3/mm3 6.40   HEMOGLOBIN g/dL 11.2*   HEMATOCRIT % 32.1*   PLATELETS 10*3/mm3 323           RADIOLOGY:  Imaging Results (Last 24 Hours)     ** No results found for the last 24 hours. **    " "            )9/3/2020  GAVIN Byrnes      Electronically signed by GAVIN Byrnes, 09/03/20, 1:33 PM.      EMR Dragon/Transcription:   \"Dictated utilizing Dragon dictation\".   "

## 2020-09-03 NOTE — THERAPY TREATMENT NOTE
Acute Care - Occupational Therapy Treatment Note   [facility]     Patient Name: Ivan Maradiaga  : 1950  MRN: [id number]  Today's Date: 9/3/2020             Admit Date: 2020       ICD-10-CM ICD-9-CM   1. Delirium R41.0 780.09   2. Pneumonia due to COVID-19 virus U07.1 480.8    J12.89    3. Weakness R53.1 780.79     Patient Active Problem List   Diagnosis   • Delirium   • Weakness   • Pneumonia due to COVID-19 virus     Past Medical History:   Diagnosis Date   • CAD (coronary artery disease)    • CHF (congestive heart failure) (CMS/[facility])    • Dementia (CMS/[facility])    • Hypertension      History reviewed. No pertinent surgical history.    Therapy Treatment    Rehabilitation Treatment Summary     Row Name 20             Treatment Time/Intention    Discipline  occupational therapist  -      Document Type  therapy note (daily note)  -      Subjective Information  complains of;weakness  -      Patient Effort  adequate  -      Comment  Pt agrees to attempt getting up to his chair.   -      Recorded by [] Jessica Ansari OT 20 1000      Row Name 20             Vital Signs    O2 Delivery Pre Treatment  room air  -      Intra SpO2 (%)  95  -MH      O2 Delivery Intra Treatment  room air  -MH      Post SpO2 (%)  97  -MH      O2 Delivery Post Treatment  room air  -MH      Pre Patient Position  Supine  -      Intra Patient Position  Sitting  -      Post Patient Position  Supine  -MH      Recorded by [] Jessica Ansari OT 20 1000      Row Name 20             Cognitive Assessment/Intervention- PT/OT    Affect/Mental Status (Cognitive)  anxious  -MH      Follows Commands (Cognition)  follows one step commands;WFL  -MH      Cognitive Function (Cognitive)  executive function deficit;memory deficit;safety deficit  -      Recorded by [] Jessica Ansari OT 20 1000      Row Name 20             Bed Mobility Assessment/Treatment    Bed Mobility  Assessment/Treatment  supine-sit;sit-supine;scooting/bridging;rolling right  -      Rolling Right [place] (Bed Mobility)  moderate assist (50% patient effort)  -      Scooting/Bridging [place] (Bed Mobility)  minimum assist (75% patient effort);verbal cues;set up using bedrails  -      Supine-Sit [place] (Bed Mobility)  moderate assist (50% patient effort)  -      Sit-Supine [place] (Bed Mobility)  minimum assist (75% patient effort) could not scoop Lt leg out or in w/ Rt leg  -      Comment (Bed Mobility)  attempt to simulate sup<>sit from his [facility] bed at home, which he does (I) @ baseline.  -MH2      Recorded by [] Jessica Ansari, OT 09/03/20 1000  [2] Jessica Ansari, OT 09/03/20 1012      Row Name 09/03/20 0900             Functional Mobility    Functional Mobility- Ind. Level  unable to perform  -      Functional Mobility- Comment  walks about 10' @ baseline OOB to his power cahir w/ quad cane, which is present, but pt is unable to come to stand.  -      Recorded by [] Jessica Ansari, OT 09/03/20 1012      Row Name 09/03/20 0900             Transfer Assessment/Treatment    Transfer Assessment/Treatment  bed-chair transfer;sit-stand transfer;stand-sit transfer  -      Recorded by [] Jessica Ansari, OT 09/03/20 1012      Row Name 09/03/20 0900             Bed-Chair Transfer    Bed-Chair [place] (Transfers)  dependent (less than 25% patient effort) unable, partly limited by fear  -      Recorded by [] Jessica Ansari, OT 09/03/20 1012      Row Name 09/03/20 0900             Sit-Stand Transfer    Sit-Stand [place] (Transfers)  maximum assist (25% patient effort)  -      Assistive Device (Sit-Stand Transfers)  cane, quad  -      Recorded by [] Jessica Ansari, OT 09/03/20 1012      Row Name 09/03/20 0900             Stand-Sit Transfer    Stand-Sit [place] (Transfers)  minimum assist (75% patient effort)  -      Recorded by [] Jessica Ansari,  OT 09/03/20 1012      Row Name 09/03/20 0900             ADL Assessment/Intervention    BADL Assessment/Intervention  lower body dressing;feeding  -      Recorded by [] Jessica Ansari, OT 09/03/20 1012      Row Name 09/03/20 0900             Lower Body Dressing Assessment/Training    Lower Body Dressing [place] Level  don;shoes/slippers;set up;supervision  -      Lower Body Dressing Position  edge of bed sitting  -      Recorded by [] Jessica Ansari OT 09/03/20 1012      Row Name 09/03/20 0900             Self-Feeding Assessment/Training    [place] Level (Feeding)  set up  -      Comment (Feeding)  hemiparetic w/ falcid LUE  -      Recorded by [] Jessica Ansari OT 09/03/20 1012      Row Name 09/03/20 0900             BADL Safety/Performance    Impairments, BADL Safety/Performance  balance;cognition;endurance/activity tolerance;grasp/prehension;coordination;strength;range of motion  -      Cognitive Impairments, BADL Safety/Performance  awareness, need for assistance;insight into deficits/self awareness;judgment;problem solving/reasoning;safety precaution awareness  -      Recorded by [] Jessica Ansari, OT 09/03/20 1012      Row Name 09/03/20 0900             Dynamic Sitting Balance    Level of [place], Reaches Outside Midline (Sitting, Dynamic Balance)  supervision  -      Recorded by [] Jessica Ansari, OT 09/03/20 1012      Row Name 09/03/20 0900             Static Standing Balance    Level of [place] (Supported Standing, Static Balance)  maximal assist, 25 to 49% patient effort  -      Time Able to Maintain Position (Supported Standing, Static Balance)  less than 15 seconds  -      Assistive Device Utilized (Supported Standing, Static Balance)  cane, quad  -      Recorded by [] Jessica Ansari, OT 09/03/20 1012      Row Name 09/03/20 0900             Dynamic Standing Balance    Level of [place], Reaches Outside Midline (Standing, Dynamic Balance)  unable  to perform activity  -      Recorded by [] Jessica Ansari, OT 09/03/20 1012      Row Name 09/03/20 0900             Positioning and Restraints    Pre-Treatment Position  in bed  -      Post Treatment Position  bed  -MH      In Bed  call light within reach;encouraged to call for assist;exit alarm on;sitting;notified nsg  -      Recorded by [] Jessica Ansari, OT 09/03/20 1012      Row Name 09/03/20 0900             Pain Scale: Numbers Pre/Post-Treatment    Pain Scale: Numbers, Pretreatment  0/10 - no pain  -MH      Pain Scale: Numbers, Post-Treatment  0/10 - no pain  -MH      Recorded by [] Jessica Ansari, OT 09/03/20 1012      Row Name 09/03/20 0900             Coping    Observed Emotional State  anxious;attention-seeking behavior;cooperative;sad  -      Verbalized Emotional State  anxiety;frustration;acceptance  -      Recorded by [] Jessica Ansari, OT 09/03/20 1012      Row Name 09/03/20 0900             Plan of Care Review    Plan of Care Reviewed With  patient  -      Progress  declining  -      Outcome Summary  Pt strength for transfers is decreasing as he deconditions. His vital signs are stable though he reports severe fatigue after attempts to stand EOB for transfer. Demonstrates signs of kinesiophobia. Lives alone @ baseline w/ CG 4 days/wk, but at baseline is able to walk 10' w/ quad cane daily to access his motor w/c. Pt has Hx  of demential. Is A&O X4, but demo signs of memory impairment & executive functioning deficits. Nathan not be safe at home. Needs IP rehab, [facility] for extended period of time. PPW worn per COVID protocol.  -MH      Recorded by [] Jessica Ansari, OT 09/03/20 1012      Row Name 09/03/20 0900             Outcome Summary/Treatment Plan (OT)    Anticipated Discharge Disposition (OT)  inpatient rehabilitation facility  -      Recorded by [] Jessica Ansari, OT 09/03/20 1012        User Key  (r) = Recorded By, (t) = Taken By, (c) = Cosigned By    Initials Name  Effective Dates Discipline     Coty JessicaDESTINEE hancock 03/01/19 -  OT             Occupational Therapy Education                 Title: PT OT SLP Therapies (In Progress)     Topic: Occupational Therapy (Done)     Point: ADL training (Done)     Description:   Instruct learner(s) on proper safety adaptation and remediation techniques during self care or transfers.   Instruct in proper use of assistive devices.              Learning Progress Summary           Patient Acceptance, E,TB, VU,NR by SHAQ at 9/1/2020 1327    Acceptance, E, VU by JOSE E at 8/29/2020 1025    Acceptance, E, VU by KS at 8/26/2020 2332    Acceptance, E,TB, VU by SS at 8/26/2020 0312    Acceptance, E,TB, VU by ES at 8/25/2020 1702                   Point: Home exercise program (Done)     Description:   Instruct learner(s) on appropriate technique for monitoring, assisting and/or progressing therapeutic exercises/activities.              Learning Progress Summary           Patient Acceptance, E, VU by JOSE E at 8/29/2020 1025    Acceptance, E,D,TB, VU,NR by  at 8/28/2020 1553    Acceptance, E, VU by KS at 8/26/2020 2332    Acceptance, E,TB, VU by SS at 8/26/2020 0312                   Point: Precautions (Done)     Description:   Instruct learner(s) on prescribed precautions during self-care and functional transfers.              Learning Progress Summary           Patient Acceptance, E,TB, VU,NR by ES at 9/1/2020 1327    Acceptance, E, VU by JOSE E at 8/29/2020 1025    Acceptance, E, VU by KS at 8/26/2020 2332    Acceptance, E,TB, VU by SS at 8/26/2020 0312    Acceptance, E,TB, VU by ES at 8/25/2020 1702                   Point: Body mechanics (Done)     Description:   Instruct learner(s) on proper positioning and spine alignment during self-care, functional mobility activities and/or exercises.              Learning Progress Summary           Patient Acceptance, E,TB, VU,NR by ES at 9/1/2020 1327    Acceptance, E, VU by JOSE E at 8/29/2020 1025    Acceptance, E,D,TB,  VU,NR by  at 8/28/2020 1553    Acceptance, E, VU by KS at 8/26/2020 2332    Acceptance, E,TB, VU by  at 8/26/2020 0312    Acceptance, E,TB, VU by ES at 8/25/2020 1702                               User Key     Initials Effective Dates Name Provider Type Discipline     03/01/19 -  Jessica Ansari OT Occupational Therapist OT    ES 03/01/19 -  Irene Lara OT Occupational Therapist OT     06/24/19 -  Rita Jones LPN Licensed Nurse Nurse     06/24/19 -  Manuela Dorsey LPN Licensed Nurse Nurse    [place] 03/01/19 -  Rizwana Yuan RN Registered Nurse Nurse                OT Recommendation and Plan  Outcome Summary/Treatment Plan (OT)  Anticipated Discharge Disposition (OT): inpatient rehabilitation facility  Plan of Care Review  Plan of Care Reviewed With: patient  Plan of Care Reviewed With: patient  Outcome Summary: Pt strength for transfers is decreasing as he deconditions. His vital signs are stable though he reports severe fatigue after attempts to stand EOB for transfer. Demonstrates signs of kinesiophobia. Lives alone @ baseline w/ CG 4 days/wk, but at baseline is able to walk 10' w/ quad cane daily to access his motor w/c. Pt has Hx  of demential. Is A&O X4, but demo signs of memory impairment & executive functioning deficits. Nathan not be safe at home. Needs IP rehab, [facility] for extended period of time. PPW worn per COVID protocol.  Outcome Measures     Row Name 09/03/20 1000             Modified [place] Scale    Modified [place] Scale  5 - Severe disability.  Bedridden, incontinent, and requiring constant nursing care and attention.  -        User Key  (r) = Recorded By, (t) = Taken By, (c) = Cosigned By    Initials Name Provider Type     Jessica Ansari, OT Occupational Therapist           Time Calculation:   Time Calculation- OT     Row Name 09/03/20 1013             Time Calculation- OT    OT Start Time  0852  -      OT Stop Time  0922  -      OT Time Calculation (min)  30  min  -      Total Timed Code Minutes- OT  30 minute(s)  -      OT Received On  09/03/20  -      OT - Next Appointment  09/04/20  -        User Key  (r) = Recorded By, (t) = Taken By, (c) = Cosigned By    Initials Name Provider Type     Jessica Ansari OT Occupational Therapist        Therapy Charges for Today     Code Description Service Date Service Provider Modifiers Qty    67609972500  OT SELF CARE/MGMT/TRAIN EA 15 MIN 9/3/2020 Jessica Ansari OT GO 1    [id number]  OT THERAPEUTIC ACT EA 15 MIN 9/3/2020 Jessica Ansari OT GO 1               Jessica Ansari OT  9/3/2020

## 2020-09-04 PROCEDURE — 99231 SBSQ HOSP IP/OBS SF/LOW 25: CPT | Performed by: HOSPITALIST

## 2020-09-04 PROCEDURE — 25010000002 ENOXAPARIN PER 10 MG: Performed by: NURSE PRACTITIONER

## 2020-09-04 RX ADMIN — CETIRIZINE HYDROCHLORIDE 5 MG: 10 TABLET, FILM COATED ORAL at 07:58

## 2020-09-04 RX ADMIN — ROSUVASTATIN CALCIUM 20 MG: 10 TABLET, FILM COATED ORAL at 20:13

## 2020-09-04 RX ADMIN — TAMSULOSIN HYDROCHLORIDE 0.8 MG: 0.4 CAPSULE ORAL at 07:58

## 2020-09-04 RX ADMIN — LEVETIRACETAM 500 MG: 500 TABLET ORAL at 20:14

## 2020-09-04 RX ADMIN — TRAZODONE HYDROCHLORIDE 50 MG: 50 TABLET ORAL at 20:13

## 2020-09-04 RX ADMIN — CALCIUM CARBONATE (ANTACID) CHEW TAB 500 MG 2 TABLET: 500 CHEW TAB at 18:16

## 2020-09-04 RX ADMIN — Medication 10 ML: at 20:13

## 2020-09-04 RX ADMIN — AMLODIPINE BESYLATE 10 MG: 5 TABLET ORAL at 07:57

## 2020-09-04 RX ADMIN — FINASTERIDE 5 MG: 5 TABLET, FILM COATED ORAL at 07:57

## 2020-09-04 RX ADMIN — Medication 5 MG: at 20:13

## 2020-09-04 RX ADMIN — HYDRALAZINE HYDROCHLORIDE 50 MG: 25 TABLET, FILM COATED ORAL at 07:57

## 2020-09-04 RX ADMIN — HYDRALAZINE HYDROCHLORIDE 50 MG: 25 TABLET, FILM COATED ORAL at 20:13

## 2020-09-04 RX ADMIN — CLOPIDOGREL BISULFATE 75 MG: 75 TABLET ORAL at 07:57

## 2020-09-04 RX ADMIN — Medication 10 ML: at 07:56

## 2020-09-04 RX ADMIN — ENOXAPARIN SODIUM 40 MG: 40 INJECTION SUBCUTANEOUS at 15:03

## 2020-09-04 RX ADMIN — LEVETIRACETAM 500 MG: 500 TABLET ORAL at 07:58

## 2020-09-04 NOTE — PROGRESS NOTES
[facility] Medicine Services Daily Progress Note      Hospitalist Team  LOS 11 days      Patient Care Team:  Kelly Gill DO as PCP - General (Physical Medicine and Rehabilitation)    Patient Location: 203/1      Subjective   Subjective     Chief Complaint / Subjective  Chief Complaint   Patient presents with   • Weakness - Generalized         Brief Synopsis of Hospital Course/HPI  70-year-old man with history of CAD, dementia, CVA with left-sided residual weakness ,? CHF and hypertension.  According to admission note and talking to his daughter--> he was recently treated at [facility] for STEMI and rhabdomyolysis in the setting of acute kidney injury after a mechanical fall.  He was subsequently discharged to rehab facility for continued physical therapy. Apparently, he was discharged from rehab on 8/22/2020.  He presented to [facility] ED with  AMS/encephalopathy.  He endorses  mild chest discomfort  shortness of breath with scant  productive cough.  No reported fever, chills, no night sweats, and no abdominal pain nausea or vomiting.  On arrival to [facility], he was screened and tested positive for COVID-19 and subsequently placed on isolation.  Chest x-ray revealed patchy peripheral airspace opacity concerning for bilateral multifocal pneumonia.      Review of record here at [facility] showed multiple testing here on 8/14 and 8/15 which were negative.  However, he had another testing done on 8/23/2020 which was positive.   Otherwise, he remained clinically stable and oxygenating well on room air. Laboratory showed static, nonzero slight evaded troponin and EKG showed sinus rhythm with RBBB.        He was evaluated by cardiologist and ID.  Patient does not require Remdisivir with stable oxygenation on RA.  He was  maintained on isolation and treated with IV antibiotic with Zosyn, and later switched to  Augmentin for COVID related pneumonia possible secondary  "bacterial infection.  The patient was felt to be stable for discharge however his daughter appealed to Medicare per nursing staff reporting that the patient's daughter wanted the patient's COVID test to be negative prior to discharge.     8/31/2020: The patient denies current complaints.  He is anxious to go home and is asking when his COVID test will be repeated.    9/1/2020: Patient denies current complaints except for feeling cold and asking for his blankets.  I spoke with the patient's daughter Maureen.  She is requesting repeat COVID testing so that if the patient is negative he will have more options for inpatient rehab since only one facility locally is taking COVID positive patients.  The COVID test was ordered resulted as positive.  Patient's daughter was notified.  9/2/2020: Patient denies c/o except that his bed is not comfortable.  He is anxious to go home.  He was counseled on the difficulties of sending him home when it is not safe for him to be there alone.   9/3/2020: Patient reports feeling cold but otherwise denies current complaints.  9/4/2020: The patient denies current complaints.    ROS  12 point review of systems was reviewed and was negative.    Objective   Objective      Vital Signs  Temp:  [93.9 °F (34.4 °C)-97.7 °F (36.5 °C)] 97.1 °F (36.2 °C)  Heart Rate:  [55-75] 62  Resp:  [16-20] 16  BP: (121-148)/(64-84) 124/73  Oxygen Therapy  SpO2: 95 %  Pulse Oximetry Type: Continuous  Device (Oxygen Therapy): room air  Device (Oxygen Therapy): nasal cannula  Flow (L/min): 2  Flowsheet Rows      First Filed Value   Admission Height  182.9 cm (72\") Documented at 08/23/2020 0314   Admission Weight  78 kg (171 lb 15.3 oz) Documented at 08/23/2020 0314        Intake & Output (last 3 days)       09/01 0701 - 09/02 0700 09/02 0701 - 09/03 0700 09/03 0701 - 09/04 0700 09/04 0701 - 09/05 0700    P.O. 480 480      Total Intake(mL/kg) 480 (5.4) 480 (5.4)      Urine (mL/kg/hr) 100 (0) 100 (0) 375 (0.2) 25 " (0.1)    Total Output 100 100 375 25    Net +380 +380 -375 -25            Urine Unmeasured Occurrence 1 x           Lines, Drains & Airways    Active LDAs     Name:   Placement date:   Placement time:   Site:   Days:    Peripheral IV 08/29/20 Posterior;Right Forearm   08/29/20    --    Forearm   5                  Physical Exam:    Physical Exam  Well-developed well-nourished gentleman lying in bed comfortable on room air awake and alert no acute distress; CV regular rate and rhythm; abdomen soft and nontender; extremities with no edema or calf tenderness; no cyanosis; no Manicni catheter.      Procedures:              Results Review:     I reviewed the patient's new clinical results.      Lab Results (last 24 hours)     ** No results found for the last 24 hours. **        No results found for: HGBA1C            No results found for: LIPASE  No results found for: CHOL, CHLPL, TRIG, HDL, LDL, LDLDIRECT    No results found for: INTRAOP, PREDX, FINALDX, COMDX    Microbiology Results (last 10 days)     Procedure Component Value - Date/Time    Respiratory Panel PCR w/COVID-19(SARS-CoV-2) MAIRA/NANDINI/ANA/PAD/COR/MAD In-House, NP Swab in UTM/VTM, 3-4 HR TAT - Swab, Nasopharynx [[id number]]  (Abnormal) Collected:  09/01/20 1424    Lab Status:  Final result Specimen:  Swab from Nasopharynx Updated:  09/01/20 1558     ADENOVIRUS, PCR Not Detected     Coronavirus 229E Not Detected     Coronavirus HKU1 Not Detected     Coronavirus NL63 Not Detected     Coronavirus OC43 Not Detected     COVID19 Detected     Human Metapneumovirus Not Detected     Human Rhinovirus/Enterovirus Not Detected     Influenza A PCR Not Detected     Influenza A H1 Not Detected     Influenza A H1 2009 PCR Not Detected     Influenza A H3 Not Detected     Influenza B PCR Not Detected     Parainfluenza Virus 1 Not Detected     Parainfluenza Virus 2 Not Detected     Parainfluenza Virus 3 Not Detected     Parainfluenza Virus 4 Not Detected     RSV, PCR Not Detected      Bordetella pertussis pcr Not Detected     Bordetella parapertussis PCR Not Detected     Chlamydophila pneumoniae PCR Not Detected     Mycoplasma pneumo by PCR Not Detected    Narrative:       Fact sheet for providers: https://docs.[employer]/wp-content/uploads/QSD3316-5560-YR7.1-EUA-Provider-Fact-Sheet-3.pdf    Fact sheet for patients: https://docs.[employer]/wp-content/uploads/GIJ9636-2078-HQ4.1-EUA-Patient-Fact-Sheet-1.pdf          ECG/EMG Results (most recent)     Procedure Component Value Units Date/Time    ECG 12 Lead [[id number]] Collected:  08/23/20 0619     Updated:  08/23/20 0621    Narrative:       HEART RATE= 91  bpm  RR Interval= 660  ms  MD Interval= 163  ms  P Horizontal Axis= 24  deg  P Front Axis= 64  deg  QRSD Interval= 152  ms  QT Interval= 382  ms  QRS Axis= -83  deg  T Wave Axis= -32  deg  - ABNORMAL ECG -  Sinus rhythm  RBBB and LAFB  Probable lateral infarct, age indeterminate  Electronically Signed By:   Date and Time of Study: 2020-08-23 06:19:41    ECG 12 Lead [[id number]] Collected:  08/26/20 1325     Updated:  08/31/20 2032    Narrative:       HEART RATE= 98  bpm  RR Interval= 612  ms  MD Interval= 163  ms  P Horizontal Axis= 40  deg  P Front Axis= 60  deg  QRSD Interval= 154  ms  QT Interval= 396  ms  QRS Axis= 258  deg  T Wave Axis= -53  deg  - ABNORMAL ECG -  Sinus rhythm  Ventricular premature complex  Inferior infarct, old  When compared with ECG of 23-Aug-2020 6:19:41,  No significant change  Electronically Signed By: Jorge Chacon (ANA) 31-Aug-2020 20:08:03  Date and Time of Study: 2020-08-26 13:25:06                    Xr Chest 1 View    Result Date: 8/28/2020  The lungs consistent with multifocal pneumonia, without significant change from 08/25/2020.  Electronically Signed By-Dr. Alexus Mejias MD On:8/28/2020 11:03 AM This report was finalized on [id number] by Dr. Alexus Mejias MD.          Xrays, labs reviewed personally by physician.    Medication Review:   I  have reviewed the patient's current medication list      Scheduled Meds    amLODIPine 10 mg Oral Daily   cetirizine 5 mg Oral Daily   clopidogrel 75 mg Oral Daily   enoxaparin 40 mg Subcutaneous Q24H   finasteride 5 mg Oral Daily   hydrALAZINE 50 mg Oral BID   levETIRAcetam 500 mg Oral BID   rosuvastatin 20 mg Oral Daily   sodium chloride 10 mL Intravenous Q12H   tamsulosin 0.8 mg Oral Daily   traZODone 50 mg Oral Nightly       Meds Infusions    sodium chloride 100 mL/hr Last Rate: 100 mL/hr (08/30/20 0631)       Meds PRN  •  acetaminophen **OR** acetaminophen **OR** acetaminophen  •  benzonatate  •  calcium carbonate  •  melatonin  •  ondansetron **OR** ondansetron  •  potassium chloride  •  potassium chloride  •  sodium chloride        Assessment/Plan   Assessment/Plan     Active Hospital Problems    Diagnosis  POA   • **Pneumonia due to COVID-19 virus [U07.1, J12.89]  Unknown   • Delirium [R41.0]  Yes   • Weakness [R53.1]  Yes      Resolved Hospital Problems   No resolved problems to display.       MEDICAL DECISION MAKING COMPLEXITY BY PROBLEM:     Assessment and plan:  Acute COVID-19 pneumonia   -Patient retested and was still positive for COVID-19 on 9/1/2020    Possible secondary bacterial pneumonia, resolved        -oxygenating well on room air          -completed IV Zosyn followed by Augmentin     Coronary disease, recent non-ST elevation MI         -on Amlodipine/Plavix/hydralazine/Crestor         -Cardiology saw the patient in consultation and the patient is felt to be stable; cardiology has signed off             Essential hypertension, chronic and controlled         -Continue amlodipine /hydralazine      BPH       -Continue Proscar/Flomax     History of CVA with residual left-sided hemiparesis        -Continue Plavix/Crestor, and bedside range of motion as tolerated     Disposition:  He was discharged 8/28/2020, however, daughter contested discharge which was upheld and then she appealed the denial.  Brenna reviewed the appeal and on reconsideration it was determined that the patient services should continue without liability until further notice.  I spoke with Melisa Jones utilization review coordinator with  at [facility] and she is going to be contacting [facility] for further clarity.        VTE Prophylaxis -   Mechanical Order History:     None      Pharmalogical Order History:     Ordered     Dose Route Frequency Stop    08/23/20 0559  enoxaparin (LOVENOX) syringe 40 mg      40 mg SC Every 24 Hours --            Code Status -   Code Status and Medical Interventions:   Ordered at: 08/23/20 0559     Code Status:    CPR     Medical Interventions (Level of Support Prior to Arrest):    Full       This patient has been examined wearing appropriate Personal Protective Equipment and discussed with Care coordinator Melisa Jones. 09/04/20        Discharge Planning  Pending discharge appeal outcome.          Electronically signed by Tessie Brenner MD, 09/04/20, 09:06.  [facility] Hospitalist Team

## 2020-09-04 NOTE — PROGRESS NOTES
Continued Stay Note   [facility]     Patient Name: Ivan Maradiaga  MRN: [id number]  Today's Date: 9/4/2020    Admit Date: 8/23/2020    Discharge Plan     Row Name 09/04/20 0744       Plan    Plan  DC Plan: [facility] has approved HH but will not sign order until [facility] submits DC Summary to them. Pending possible placement, COVID +. Daughter has reject all COVID + facilities in [facility] and [place].     Row Name 09/04/20 0743       Plan    Plan  DC Plan: Dicharge overturned per Brenna 9/3.             Expected Discharge Date and Time     Expected Discharge Date Expected Discharge Time    Aug 28, 2020             Karuna Garza RN

## 2020-09-04 NOTE — PLAN OF CARE
Problem: Patient Care Overview  Goal: Plan of Care Review  Outcome: Ongoing (interventions implemented as appropriate)  Flowsheets  Taken 9/3/2020 1435 by Deandra Miranda LPN  Progress: no change  Taken 9/3/2020 1912 by Juvencio Burton LPN  Plan of Care Reviewed With: patient  Taken 9/4/2020 0211 by Juvencio Burton LPN  Outcome Summary: pt resting in bed, c/o soa place 02 on , pt remove and uses, encouragement in doing things self , Proper PPE use , continue isolation , Pending discharge on home health arrangements and md orders cont to monitor     Problem: Fall Risk (Adult)  Goal: Identify Related Risk Factors and Signs and Symptoms  Flowsheets  Taken 9/2/2020 0356 by Mary Shoemaker LPN  Related Risk Factors (Fall Risk): gait/mobility problems;environment unfamiliar  Taken 9/4/2020 0211 by Juvencio Burton LPN  Signs and Symptoms (Fall Risk): presence of risk factors     Problem: Skin Injury Risk (Adult)  Goal: Identify Related Risk Factors and Signs and Symptoms  Flowsheets (Taken 9/2/2020 0356 by Mary Shoemaker LPN)  Related Risk Factors (Skin Injury Risk): mobility impaired;moisture  Goal: Skin Health and Integrity  Flowsheets (Taken 9/2/2020 0356 by Mary Shoemaker LPN)  Skin Health and Integrity: making progress toward outcome

## 2020-09-04 NOTE — PLAN OF CARE
Problem: Patient Care Overview  Goal: Plan of Care Review  Outcome: Ongoing (interventions implemented as appropriate)  Flowsheets  Taken 9/4/2020 1259  Progress: no change  Outcome Summary: 02 not needed this shift. Remains risk for falls & skin injury. needs encouragement to do things for self. D/C still pending with home health services. no new complaints or symptoms  Taken 9/4/2020 0730  Plan of Care Reviewed With: patient

## 2020-09-04 NOTE — PLAN OF CARE
PT attempted to see pt this PM but pt refused multiple times. Pt was educated that it may be days before he is seen again due to scheduling this weekend and pt verbalized understanding. PPE worn includes gloves, n95, surgical mask, face shield, gown, hair bonnet and shoe covers. F/U at later date.  Bradley Daniel PT, DPT

## 2020-09-05 PROCEDURE — 99231 SBSQ HOSP IP/OBS SF/LOW 25: CPT | Performed by: HOSPITALIST

## 2020-09-05 PROCEDURE — 25010000002 ENOXAPARIN PER 10 MG: Performed by: NURSE PRACTITIONER

## 2020-09-05 RX ADMIN — Medication 10 ML: at 20:38

## 2020-09-05 RX ADMIN — AMLODIPINE BESYLATE 10 MG: 5 TABLET ORAL at 08:50

## 2020-09-05 RX ADMIN — CLOPIDOGREL BISULFATE 75 MG: 75 TABLET ORAL at 08:51

## 2020-09-05 RX ADMIN — LEVETIRACETAM 500 MG: 500 TABLET ORAL at 20:38

## 2020-09-05 RX ADMIN — HYDRALAZINE HYDROCHLORIDE 50 MG: 25 TABLET, FILM COATED ORAL at 08:52

## 2020-09-05 RX ADMIN — TAMSULOSIN HYDROCHLORIDE 0.8 MG: 0.4 CAPSULE ORAL at 08:50

## 2020-09-05 RX ADMIN — ENOXAPARIN SODIUM 40 MG: 40 INJECTION SUBCUTANEOUS at 16:38

## 2020-09-05 RX ADMIN — CETIRIZINE HYDROCHLORIDE 5 MG: 10 TABLET, FILM COATED ORAL at 08:52

## 2020-09-05 RX ADMIN — Medication 10 ML: at 08:54

## 2020-09-05 RX ADMIN — LEVETIRACETAM 500 MG: 500 TABLET ORAL at 08:52

## 2020-09-05 RX ADMIN — HYDRALAZINE HYDROCHLORIDE 50 MG: 25 TABLET, FILM COATED ORAL at 20:38

## 2020-09-05 RX ADMIN — TRAZODONE HYDROCHLORIDE 50 MG: 50 TABLET ORAL at 20:38

## 2020-09-05 RX ADMIN — FINASTERIDE 5 MG: 5 TABLET, FILM COATED ORAL at 08:51

## 2020-09-05 RX ADMIN — Medication 5 MG: at 20:38

## 2020-09-05 RX ADMIN — CALCIUM CARBONATE (ANTACID) CHEW TAB 500 MG 2 TABLET: 500 CHEW TAB at 18:18

## 2020-09-05 RX ADMIN — ROSUVASTATIN CALCIUM 20 MG: 10 TABLET, FILM COATED ORAL at 20:38

## 2020-09-05 NOTE — PROGRESS NOTES
[facility] Medicine Services Daily Progress Note      Hospitalist Team  LOS 12 days      Patient Care Team:  Kelly Gill DO as PCP - General (Physical Medicine and Rehabilitation)    Patient Location: 203/1      Subjective   Subjective     Chief Complaint / Subjective  Chief Complaint   Patient presents with   • Weakness - Generalized         Brief Synopsis of Hospital Course/HPI  70-year-old man with history of CAD, dementia, CVA with left-sided residual weakness ,? CHF and hypertension.  According to admission note and talking to his daughter--> he was recently treated at [facility] for STEMI and rhabdomyolysis in the setting of acute kidney injury after a mechanical fall.  He was subsequently discharged to rehab facility for continued physical therapy. Apparently, he was discharged from rehab on 8/22/2020.  He presented to [facility] ED with  AMS/encephalopathy.  He endorses  mild chest discomfort  shortness of breath with scant  productive cough.  No reported fever, chills, no night sweats, and no abdominal pain nausea or vomiting.  On arrival to [facility], he was screened and tested positive for COVID-19 and subsequently placed on isolation.  Chest x-ray revealed patchy peripheral airspace opacity concerning for bilateral multifocal pneumonia.      Review of record here at [facility] showed multiple testing here on 8/14 and 8/15 which were negative.  However, he had another testing done on 8/23/2020 which was positive.   Otherwise, he remained clinically stable and oxygenating well on room air. Laboratory showed static, nonzero slight evaded troponin and EKG showed sinus rhythm with RBBB.        He was evaluated by cardiologist and ID.  Patient does not require Remdisivir with stable oxygenation on RA.  He was  maintained on isolation and treated with IV antibiotic with Zosyn, and later switched to  Augmentin for COVID related pneumonia possible secondary  "bacterial infection.  The patient was felt to be stable for discharge however his daughter appealed to Medicare per nursing staff reporting that the patient's daughter wanted the patient's COVID test to be negative prior to discharge.     8/31/2020: The patient denies current complaints.  He is anxious to go home and is asking when his COVID test will be repeated.    9/1/2020: Patient denies current complaints except for feeling cold and asking for his blankets.  I spoke with the patient's daughter Maureen.  She is requesting repeat COVID testing so that if the patient is negative he will have more options for inpatient rehab since only one facility locally is taking COVID positive patients.  The COVID test was ordered resulted as positive.  Patient's daughter was notified.  9/2/2020: Patient denies c/o except that his bed is not comfortable.  He is anxious to go home.  He was counseled on the difficulties of sending him home when it is not safe for him to be there alone.   9/3/2020: Patient reports feeling cold but otherwise denies current complaints.  9/4/2020: The patient denies current complaints.  9/5/2020: Patient reports no current complaints.  He reports that he is too weak to walk.    ROS  12 point review of systems was reviewed and was negative.    Objective   Objective      Vital Signs  Temp:  [96.2 °F (35.7 °C)-97.3 °F (36.3 °C)] 96.9 °F (36.1 °C)  Heart Rate:  [64-90] 64  Resp:  [17-19] 17  BP: (109-129)/(69-78) 129/75  Oxygen Therapy  SpO2: 93 %  Pulse Oximetry Type: Continuous  Device (Oxygen Therapy): room air  Device (Oxygen Therapy): room air  Flow (L/min): 2  Flowsheet Rows      First Filed Value   Admission Height  182.9 cm (72\") Documented at 08/23/2020 0314   Admission Weight  78 kg (171 lb 15.3 oz) Documented at 08/23/2020 0314        Intake & Output (last 3 days)       09/02 0701 - 09/03 0700 09/03 0701 - 09/04 0700 09/04 0701 - 09/05 0700 09/05 0701 - 09/06 0700    P.O. 480  780 240    I.V. " (mL/kg)    0 (0)    Total Intake(mL/kg) 480 (5.4)  780 (8.8) 240 (2.7)    Urine (mL/kg/hr) 100 (0) 375 (0.2) 375 (0.2) 180 (0.5)    Total Output 100 375 375 180    Net +380 -375 +405 +60                Lines, Drains & Airways    Active LDAs     Name:   Placement date:   Placement time:   Site:   Days:    Peripheral IV 08/29/20 Posterior;Right Forearm   08/29/20    --    Forearm   5                  Physical Exam:    Physical Exam well-developed well-nourished gentleman lying in bed awake and alert no acute distress; sclera anicteric, mucous membranes moist; lungs clear to auscultation bilaterally; CV regular rate and rhythm; abdomen soft and nontender; extremities with no edema or calf tenderness.        Procedures:              Results Review:     I reviewed the patient's new clinical results.      Lab Results (last 24 hours)     ** No results found for the last 24 hours. **        No results found for: HGBA1C            No results found for: LIPASE  No results found for: CHOL, CHLPL, TRIG, HDL, LDL, LDLDIRECT    No results found for: INTRAOP, PREDX, FINALDX, COMDX    Microbiology Results (last 10 days)     Procedure Component Value - Date/Time    Respiratory Panel PCR w/COVID-19(SARS-CoV-2) [facility] In-House, NP Swab in UTM/VTM, 3-4 HR TAT - Swab, Nasopharynx [[id number]]  (Abnormal) Collected:  09/01/20 1424    Lab Status:  Final result Specimen:  Swab from Nasopharynx Updated:  09/01/20 1558     ADENOVIRUS, PCR Not Detected     Coronavirus 229E Not Detected     Coronavirus HKU1 Not Detected     Coronavirus NL63 Not Detected     Coronavirus OC43 Not Detected     COVID19 Detected     Human Metapneumovirus Not Detected     Human Rhinovirus/Enterovirus Not Detected     Influenza A PCR Not Detected     Influenza A H1 Not Detected     Influenza A H1 2009 PCR Not Detected     Influenza A H3 Not Detected     Influenza B PCR Not Detected     Parainfluenza Virus 1 Not Detected     Parainfluenza Virus 2 Not  Detected     Parainfluenza Virus 3 Not Detected     Parainfluenza Virus 4 Not Detected     RSV, PCR Not Detected     Bordetella pertussis pcr Not Detected     Bordetella parapertussis PCR Not Detected     Chlamydophila pneumoniae PCR Not Detected     Mycoplasma pneumo by PCR Not Detected    Narrative:       Fact sheet for providers: https://docs.[employer]/wp-content/uploads/JJN7689-0146-MX0.1-EUA-Provider-Fact-Sheet-3.pdf    Fact sheet for patients: https://docs.[employer]/wp-content/uploads/JCQ9805-7173-KC8.1-EUA-Patient-Fact-Sheet-1.pdf          ECG/EMG Results (most recent)     Procedure Component Value Units Date/Time    ECG 12 Lead [[id number]] Collected:  08/23/20 0619     Updated:  08/23/20 0621    Narrative:       HEART RATE= 91  bpm  RR Interval= 660  ms  DC Interval= 163  ms  P Horizontal Axis= 24  deg  P Front Axis= 64  deg  QRSD Interval= 152  ms  QT Interval= 382  ms  QRS Axis= -83  deg  T Wave Axis= -32  deg  - ABNORMAL ECG -  Sinus rhythm  RBBB and LAFB  Probable lateral infarct, age indeterminate  Electronically Signed By:   Date and Time of Study: 2020-08-23 06:19:41    ECG 12 Lead [[id number]] Collected:  08/26/20 1325     Updated:  08/31/20 2032    Narrative:       HEART RATE= 98  bpm  RR Interval= 612  ms  DC Interval= 163  ms  P Horizontal Axis= 40  deg  P Front Axis= 60  deg  QRSD Interval= 154  ms  QT Interval= 396  ms  QRS Axis= 258  deg  T Wave Axis= -53  deg  - ABNORMAL ECG -  Sinus rhythm  Ventricular premature complex  Inferior infarct, old  When compared with ECG of 23-Aug-2020 6:19:41,  No significant change  Electronically Signed By: Jorge Chacon (ANA) 31-Aug-2020 20:08:03  Date and Time of Study: 2020-08-26 13:25:06                    No radiology results for the last 7 days        Xrays, labs reviewed personally by physician.    Medication Review:   I have reviewed the patient's current medication list      Scheduled Meds    amLODIPine 10 mg Oral Daily   cetirizine 5 mg  Oral Daily   clopidogrel 75 mg Oral Daily   enoxaparin 40 mg Subcutaneous Q24H   finasteride 5 mg Oral Daily   hydrALAZINE 50 mg Oral BID   levETIRAcetam 500 mg Oral BID   rosuvastatin 20 mg Oral Daily   sodium chloride 10 mL Intravenous Q12H   tamsulosin 0.8 mg Oral Daily   traZODone 50 mg Oral Nightly       Meds Infusions    sodium chloride 100 mL/hr Last Rate: 100 mL/hr (08/30/20 0631)       Meds PRN  •  acetaminophen **OR** acetaminophen **OR** acetaminophen  •  benzonatate  •  calcium carbonate  •  melatonin  •  ondansetron **OR** ondansetron  •  potassium chloride  •  potassium chloride  •  sodium chloride        Assessment/Plan   Assessment/Plan     Active Hospital Problems    Diagnosis  POA   • **Pneumonia due to COVID-19 virus [U07.1, J12.89]  Unknown   • Delirium [R41.0]  Yes   • Weakness [R53.1]  Yes      Resolved Hospital Problems   No resolved problems to display.       MEDICAL DECISION MAKING COMPLEXITY BY PROBLEM:     Assessment and plan:  Acute COVID-19 pneumonia   -Patient retested and was still positive for COVID-19 on 9/1/2020    Possible secondary bacterial pneumonia, resolved        -oxygenating well on room air          -completed IV Zosyn followed by Augmentin     Coronary disease, recent non-ST elevation MI         -on Amlodipine/Plavix/hydralazine/Crestor         -Cardiology saw the patient in consultation and the patient is felt to be stable; cardiology has signed off             Essential hypertension, chronic and controlled         -Continue amlodipine /hydralazine      BPH       -Continue Proscar/Flomax     History of CVA with residual left-sided hemiparesis        -Continue Plavix/Crestor, and bedside range of motion as tolerated     Disposition:  He was discharged 8/28/2020, however, daughter contested discharge which was upheld and then she appealed the denial. Brenna reviewed the appeal and on reconsideration it was determined that the patient services should continue without  liability until further notice.  I spoke with Melisa Jones utilization review coordinator with  at [facility] and she is going to be contacting Brenna for further clarity.        VTE Prophylaxis -   Mechanical Order History:     None      Pharmalogical Order History:     Ordered     Dose Route Frequency Stop    08/23/20 0559  enoxaparin (LOVENOX) syringe 40 mg      40 mg SC Every 24 Hours --            Code Status -   Code Status and Medical Interventions:   Ordered at: 08/23/20 0559     Code Status:    CPR     Medical Interventions (Level of Support Prior to Arrest):    Full       This patient has been examined wearing appropriate Personal Protective Equipment  09/05/20        Discharge Planning  Pending rehab placement          Electronically signed by Tessie Brenner MD, 09/05/20, 11:11.  [facility] Hospitalist Team

## 2020-09-05 NOTE — NURSING NOTE
Spoke with Pt's daughter this morning over why pt is still at our facility and listened to what the daughter would like to see; her father testing negative for covid and be d/c home.  Pt is weak or seems to be weakened due to past CVA effecting his lf side of body.  Pt is not physically up to d/c home and will need to be d/c to a facility.  Pt's daughter I dont' think understands this or wants to.

## 2020-09-05 NOTE — PLAN OF CARE
Patient did well during the night.  Refused to allow me to do a skin check on him.  Educated the patient.  Will continue to monitor.

## 2020-09-05 NOTE — PLAN OF CARE
Continue to monitor and assess pt.  Spoke with daughter and went over pt's care along with d/c ways.    Problem: Patient Care Overview  Goal: Plan of Care Review  Outcome: Ongoing (interventions implemented as appropriate)  Flowsheets  Taken 9/4/2020 1259 by Brandon Vargas, RN  Progress: no change  Outcome Summary: 02 not needed this shift. Remains risk for falls & skin injury. needs encouragement to do things for self. D/C still pending with home health services. no new complaints or symptoms  Taken 9/5/2020 0759 by Tayler Camargo RN  Plan of Care Reviewed With: patient  Goal: Individualization and Mutuality  Outcome: Ongoing (interventions implemented as appropriate)  Goal: Discharge Needs Assessment  Outcome: Ongoing (interventions implemented as appropriate)  Flowsheets (Taken 8/24/2020 1458 by Karuna Garza RN)  Discharge Coordination/Progress: DC Plan: from home alone, PT recommends inpt rehab, pending choice.  Equipment Currently Used at Home: wheelchair, motorized  Anticipated Changes Related to Illness: inability to care for self  Transportation Anticipated: family or friend will provide;health plan transportation  Concerns to be Addressed: discharge planning  Readmission Within the Last 30 Days: no previous admission in last 30 days  Patient/Family Anticipated Services at Transition:   Patient's Choice of Community Agency(s): dicussed inpt rehab with son - no decision yet.  Patient/Family Anticipates Transition to: inpatient rehabilitation facility  Goal: Interprofessional Rounds/Family Conf  Outcome: Ongoing (interventions implemented as appropriate)  Flowsheets (Taken 9/5/2020 1217)  Participants: nursing; patient; physician     Problem: Fall Risk (Adult)  Goal: Identify Related Risk Factors and Signs and Symptoms  Outcome: Ongoing (interventions implemented as appropriate)  Flowsheets  Taken 9/2/2020 0356 by Mary Shoemaker LPN  Related Risk Factors (Fall Risk): gait/mobility  problems;environment unfamiliar  Taken 9/4/2020 0211 by Juvencio Burton LPN  Signs and Symptoms (Fall Risk): presence of risk factors     Problem: Skin Injury Risk (Adult)  Goal: Identify Related Risk Factors and Signs and Symptoms  Outcome: Ongoing (interventions implemented as appropriate)  Flowsheets (Taken 9/2/2020 0356 by Mary Shoemaker LPN)  Related Risk Factors (Skin Injury Risk): mobility impaired;moisture  Goal: Skin Health and Integrity  Outcome: Ongoing (interventions implemented as appropriate)  Flowsheets (Taken 9/2/2020 0356 by Mary Shoemaker LPN)  Skin Health and Integrity: making progress toward outcome

## 2020-09-06 LAB

## 2020-09-06 PROCEDURE — 0202U NFCT DS 22 TRGT SARS-COV-2: CPT | Performed by: HOSPITALIST

## 2020-09-06 PROCEDURE — 25010000002 ENOXAPARIN PER 10 MG: Performed by: NURSE PRACTITIONER

## 2020-09-06 PROCEDURE — 99231 SBSQ HOSP IP/OBS SF/LOW 25: CPT | Performed by: HOSPITALIST

## 2020-09-06 RX ADMIN — CALCIUM CARBONATE (ANTACID) CHEW TAB 500 MG 2 TABLET: 500 CHEW TAB at 20:34

## 2020-09-06 RX ADMIN — Medication 10 ML: at 20:11

## 2020-09-06 RX ADMIN — HYDRALAZINE HYDROCHLORIDE 50 MG: 25 TABLET, FILM COATED ORAL at 20:11

## 2020-09-06 RX ADMIN — HYDRALAZINE HYDROCHLORIDE 50 MG: 25 TABLET, FILM COATED ORAL at 08:56

## 2020-09-06 RX ADMIN — FINASTERIDE 5 MG: 5 TABLET, FILM COATED ORAL at 08:58

## 2020-09-06 RX ADMIN — LEVETIRACETAM 500 MG: 500 TABLET ORAL at 20:11

## 2020-09-06 RX ADMIN — Medication 10 ML: at 08:53

## 2020-09-06 RX ADMIN — POTASSIUM CHLORIDE 40 MEQ: 1500 TABLET, EXTENDED RELEASE ORAL at 08:53

## 2020-09-06 RX ADMIN — ENOXAPARIN SODIUM 40 MG: 40 INJECTION SUBCUTANEOUS at 15:23

## 2020-09-06 RX ADMIN — TAMSULOSIN HYDROCHLORIDE 0.8 MG: 0.4 CAPSULE ORAL at 08:54

## 2020-09-06 RX ADMIN — AMLODIPINE BESYLATE 10 MG: 5 TABLET ORAL at 08:56

## 2020-09-06 RX ADMIN — CLOPIDOGREL BISULFATE 75 MG: 75 TABLET ORAL at 08:55

## 2020-09-06 RX ADMIN — LEVETIRACETAM 500 MG: 500 TABLET ORAL at 08:55

## 2020-09-06 RX ADMIN — ROSUVASTATIN CALCIUM 20 MG: 10 TABLET, FILM COATED ORAL at 20:11

## 2020-09-06 RX ADMIN — CETIRIZINE HYDROCHLORIDE 5 MG: 10 TABLET, FILM COATED ORAL at 08:56

## 2020-09-06 RX ADMIN — TRAZODONE HYDROCHLORIDE 50 MG: 50 TABLET ORAL at 20:11

## 2020-09-06 NOTE — PLAN OF CARE
Continue to monitor and assess pt.  Pt is doing better and able to perform more task on his own.    Problem: Patient Care Overview  Goal: Plan of Care Review  Outcome: Ongoing (interventions implemented as appropriate)  Flowsheets  Taken 9/4/2020 1259 by Brandon Vargas, RN  Progress: no change  Outcome Summary: 02 not needed this shift. Remains risk for falls & skin injury. needs encouragement to do things for self. D/C still pending with home health services. no new complaints or symptoms  Taken 9/6/2020 0758 by Tayler Camargo RN  Plan of Care Reviewed With: patient;daughter  Goal: Individualization and Mutuality  Outcome: Ongoing (interventions implemented as appropriate)  Goal: Discharge Needs Assessment  Outcome: Ongoing (interventions implemented as appropriate)  Flowsheets (Taken 8/24/2020 1458 by Karuna Garza RN)  Discharge Coordination/Progress: DC Plan: from home alone, PT recommends inpt rehab, pending choice.  Equipment Currently Used at Home: wheelchair, motorized  Anticipated Changes Related to Illness: inability to care for self  Transportation Anticipated: family or friend will provide;health plan transportation  Concerns to be Addressed: discharge planning  Readmission Within the Last 30 Days: no previous admission in last 30 days  Patient/Family Anticipated Services at Transition:   Patient's Choice of Community Agency(s): dicussed inpt rehab with son - no decision yet.  Patient/Family Anticipates Transition to: inpatient rehabilitation facility  Goal: Interprofessional Rounds/Family Conf  Outcome: Ongoing (interventions implemented as appropriate)  Flowsheets (Taken 9/5/2020 1217)  Participants: nursing;patient;physician     Problem: Fall Risk (Adult)  Goal: Identify Related Risk Factors and Signs and Symptoms  Outcome: Ongoing (interventions implemented as appropriate)  Flowsheets  Taken 9/2/2020 0356 by Mary Shoemaker LPN  Related Risk Factors (Fall Risk): gait/mobility  problems;environment unfamiliar  Taken 9/4/2020 0211 by Juvencio Burton LPN  Signs and Symptoms (Fall Risk): presence of risk factors     Problem: Skin Injury Risk (Adult)  Goal: Identify Related Risk Factors and Signs and Symptoms  Outcome: Ongoing (interventions implemented as appropriate)  Flowsheets (Taken 9/2/2020 0356 by Mary Shoemaker LPN)  Related Risk Factors (Skin Injury Risk): mobility impaired;moisture  Goal: Skin Health and Integrity  Outcome: Ongoing (interventions implemented as appropriate)  Flowsheets (Taken 9/2/2020 0356 by Mary Shoemaker LPN)  Skin Health and Integrity: making progress toward outcome

## 2020-09-06 NOTE — PROGRESS NOTES
[facility] Medicine Services Daily Progress Note      Hospitalist Team  LOS 13 days      Patient Care Team:  Kelly Gill DO as PCP - General (Physical Medicine and Rehabilitation)    Patient Location: 203/1      Subjective   Subjective     Chief Complaint / Subjective  Chief Complaint   Patient presents with   • Weakness - Generalized         Brief Synopsis of Hospital Course/HPI  70-year-old man with history of CAD, dementia, CVA with left-sided residual weakness ,? CHF and hypertension.  According to admission note and talking to his daughter--> he was recently treated at [facility] for STEMI and rhabdomyolysis in the setting of acute kidney injury after a mechanical fall.  He was subsequently discharged to rehab facility for continued physical therapy. Apparently, he was discharged from rehab on 8/22/2020.  He presented to [facility] ED with  AMS/encephalopathy.  He endorses  mild chest discomfort  shortness of breath with scant  productive cough.  No reported fever, chills, no night sweats, and no abdominal pain nausea or vomiting.  On arrival to [facility], he was screened and tested positive for COVID-19 and subsequently placed on isolation.  Chest x-ray revealed patchy peripheral airspace opacity concerning for bilateral multifocal pneumonia.      Review of record here at [facility] showed multiple testing here on 8/14 and 8/15 which were negative.  However, he had another testing done on 8/23/2020 which was positive.   Otherwise, he remained clinically stable and oxygenating well on room air. Laboratory showed static, nonzero slight evaded troponin and EKG showed sinus rhythm with RBBB.        He was evaluated by cardiologist and ID.  Patient does not require Remdisivir with stable oxygenation on RA.  He was  maintained on isolation and treated with IV antibiotic with Zosyn, and later switched to  Augmentin for COVID related pneumonia possible secondary  bacterial infection.  The patient was felt to be stable for discharge however his daughter appealed to Medicare per nursing staff reporting that the patient's daughter wanted the patient's COVID test to be negative prior to discharge.     8/31/2020: The patient denies current complaints.  He is anxious to go home and is asking when his COVID test will be repeated.    9/1/2020: Patient denies current complaints except for feeling cold and asking for his blankets.  I spoke with the patient's daughter Maureen.  She is requesting repeat COVID testing so that if the patient is negative he will have more options for inpatient rehab since only one facility locally is taking COVID positive patients.  The COVID test was ordered resulted as positive.  Patient's daughter was notified.  9/2/2020: Patient denies c/o except that his bed is not comfortable.  He is anxious to go home.  He was counseled on the difficulties of sending him home when it is not safe for him to be there alone.   9/3/2020: Patient reports feeling cold but otherwise denies current complaints.  9/4/2020: The patient denies current complaints.  9/5/2020: Patient reports no current complaints.  He reports that he is too weak to walk.  9/6/2020: Patient denies current complaints except for some mild postnasal drainage, generalized weakness, and the bed is uncomfortable.  COVID test was performed again on 9/6/2020 because if it is negative he should be able to discharge to a noncovered facility.  If it is positive, he has to stay here because his daughter refuses to let him go to a COVID positive facility and he is not safe to discharge to home.  His COVID test was positive again today.    ROS  12 point review of systems was reviewed and was negative.    Objective   Objective      Vital Signs  Temp:  [97 °F (36.1 °C)-97.3 °F (36.3 °C)] 97.1 °F (36.2 °C)  Heart Rate:  [64-96] 65  Resp:  [16-20] 16  BP: (114-137)/(64-82) 133/75  Oxygen Therapy  SpO2: 95 %  Pulse  "Oximetry Type: Continuous  Device (Oxygen Therapy): room air  Device (Oxygen Therapy): room air  Flow (L/min): 2  Flowsheet Rows      First Filed Value   Admission Height  182.9 cm (72\") Documented at 08/23/2020 0314   Admission Weight  78 kg (171 lb 15.3 oz) Documented at 08/23/2020 0314        Intake & Output (last 3 days)       09/03 0701 - 09/04 0700 09/04 0701 - 09/05 0700 09/05 0701 - 09/06 0700 09/06 0701 - 09/07 0700    P.O.  780 1080 300    I.V. (mL/kg)   0 (0)     Total Intake(mL/kg)  780 (8.8) 1080 (12.2) 300 (3.4)    Urine (mL/kg/hr) 375 (0.2) 375 (0.2) 630 (0.3) 300 (0.4)    Total Output 375 375 630 300    Net -375 +405 +450 0                Lines, Drains & Airways    Active LDAs     Name:   Placement date:   Placement time:   Site:   Days:    Peripheral IV 08/29/20 Posterior;Right Forearm   08/29/20    --    Forearm   5                  Physical Exam:    Physical Exam   well-developed well-nourished gentleman lying in bed comfortable on room air awake and alert in no acute distress; mucous membranes moist; lungs clear to auscultation bilaterally; CV regular rate and rhythm; abdomen soft and nontender; extremities with no edema or calf tenderness  Procedures:              Results Review:     I reviewed the patient's new clinical results.      Lab Results (last 24 hours)     ** No results found for the last 24 hours. **        No results found for: HGBA1C            No results found for: LIPASE  No results found for: CHOL, CHLPL, TRIG, HDL, LDL, LDLDIRECT    No results found for: INTRAOP, PREDX, FINALDX, COMDX    Microbiology Results (last 10 days)     Procedure Component Value - Date/Time    Respiratory Panel PCR w/COVID-19(SARS-CoV-2) MAIRA/NANDINI/ANA/PAD/[facility] In-House, NP Swab in UTM/VTM, 3-4 HR TAT - Swab, Nasopharynx [[id number]]  (Abnormal) Collected:  09/01/20 1424    Lab Status:  Final result Specimen:  Swab from Nasopharynx Updated:  09/01/20 1558     ADENOVIRUS, PCR Not Detected     Coronavirus " 229E Not Detected     Coronavirus HKU1 Not Detected     Coronavirus NL63 Not Detected     Coronavirus OC43 Not Detected     COVID19 Detected     Human Metapneumovirus Not Detected     Human Rhinovirus/Enterovirus Not Detected     Influenza A PCR Not Detected     Influenza A H1 Not Detected     Influenza A H1 2009 PCR Not Detected     Influenza A H3 Not Detected     Influenza B PCR Not Detected     Parainfluenza Virus 1 Not Detected     Parainfluenza Virus 2 Not Detected     Parainfluenza Virus 3 Not Detected     Parainfluenza Virus 4 Not Detected     RSV, PCR Not Detected     Bordetella pertussis pcr Not Detected     Bordetella parapertussis PCR Not Detected     Chlamydophila pneumoniae PCR Not Detected     Mycoplasma pneumo by PCR Not Detected    Narrative:       Fact sheet for providers: https://docs.[employer]/wp-content/uploads/JQN1859-7124-GC7.1-EUA-Provider-Fact-Sheet-3.pdf    Fact sheet for patients: https://docs.[employer]/wp-content/uploads/EWN4889-7160-SQ9.1-EUA-Patient-Fact-Sheet-1.pdf          ECG/EMG Results (most recent)     Procedure Component Value Units Date/Time    ECG 12 Lead [[id number]] Collected:  08/23/20 0619     Updated:  08/23/20 0621    Narrative:       HEART RATE= 91  bpm  RR Interval= 660  ms  CT Interval= 163  ms  P Horizontal Axis= 24  deg  P Front Axis= 64  deg  QRSD Interval= 152  ms  QT Interval= 382  ms  QRS Axis= -83  deg  T Wave Axis= -32  deg  - ABNORMAL ECG -  Sinus rhythm  RBBB and LAFB  Probable lateral infarct, age indeterminate  Electronically Signed By:   Date and Time of Study: 2020-08-23 06:19:41    ECG 12 Lead [[id number]] Collected:  08/26/20 1325     Updated:  08/31/20 2032    Narrative:       HEART RATE= 98  bpm  RR Interval= 612  ms  CT Interval= 163  ms  P Horizontal Axis= 40  deg  P Front Axis= 60  deg  QRSD Interval= 154  ms  QT Interval= 396  ms  QRS Axis= 258  deg  T Wave Axis= -53  deg  - ABNORMAL ECG -  Sinus rhythm  Ventricular premature  complex  Inferior infarct, old  When compared with ECG of 23-Aug-2020 6:19:41,  No significant change  Electronically Signed By: Jorge Chacon (ANA) 31-Aug-2020 20:08:03  Date and Time of Study: 2020-08-26 13:25:06                    No radiology results for the last 7 days        Xrays, labs reviewed personally by physician.    Medication Review:   I have reviewed the patient's current medication list      Scheduled Meds    amLODIPine 10 mg Oral Daily   cetirizine 5 mg Oral Daily   clopidogrel 75 mg Oral Daily   enoxaparin 40 mg Subcutaneous Q24H   finasteride 5 mg Oral Daily   hydrALAZINE 50 mg Oral BID   levETIRAcetam 500 mg Oral BID   rosuvastatin 20 mg Oral Daily   sodium chloride 10 mL Intravenous Q12H   tamsulosin 0.8 mg Oral Daily   traZODone 50 mg Oral Nightly       Meds Infusions    sodium chloride 100 mL/hr Last Rate: 100 mL/hr (08/30/20 0631)       Meds PRN  •  acetaminophen **OR** acetaminophen **OR** acetaminophen  •  benzonatate  •  calcium carbonate  •  melatonin  •  ondansetron **OR** ondansetron  •  potassium chloride  •  potassium chloride  •  sodium chloride        Assessment/Plan   Assessment/Plan     Active Hospital Problems    Diagnosis  POA   • **Pneumonia due to COVID-19 virus [U07.1, J12.89]  Unknown   • Delirium [R41.0]  Yes   • Weakness [R53.1]  Yes      Resolved Hospital Problems   No resolved problems to display.       MEDICAL DECISION MAKING COMPLEXITY BY PROBLEM:     Assessment and plan:  Acute COVID-19 pneumonia   -Patient retested and was still positive for COVID-19 on 9/1/2020    Possible secondary bacterial pneumonia, resolved        -oxygenating well on room air          -completed IV Zosyn followed by Augmentin     Coronary disease, recent non-ST elevation MI         -on Amlodipine/Plavix/hydralazine/Crestor         -Cardiology saw the patient in consultation and the patient is felt to be stable; cardiology has signed off             Essential hypertension, chronic and  controlled         -Continue amlodipine /hydralazine      BPH       -Continue Proscar/Flomax     History of CVA with residual left-sided hemiparesis        -Continue Plavix/Crestor, and bedside range of motion as tolerated     Disposition:  He was discharged 8/28/2020, however, daughter contested discharge which was upheld and then she appealed the denial. Brenna reviewed the appeal and on reconsideration it was determined that the patient services should continue without liability until further notice.  I spoke with Melisa Jones utilization review coordinator with  at [facility] and she is going to be contacting Brenna for further clarity.        VTE Prophylaxis -   Mechanical Order History:     None      Pharmalogical Order History:     Ordered     Dose Route Frequency Stop    08/23/20 0559  enoxaparin (LOVENOX) syringe 40 mg      40 mg SC Every 24 Hours --            Code Status -   Code Status and Medical Interventions:   Ordered at: 08/23/20 0559     Code Status:    CPR     Medical Interventions (Level of Support Prior to Arrest):    Full       This patient has been examined wearing appropriate Personal Protective Equipment  09/06/20        Discharge Planning  Pending rehab placement          Electronically signed by Tessie Brenner MD, 09/06/20, 15:49.  [facility] Hospitalist Team

## 2020-09-06 NOTE — NURSING NOTE
Checking pt again for Covid sceen. Spoke with daughter and Dr. Brenner.  Informed the daughter of the covid test we were going to repeat.  Also placed an extra heated blanket on pt after daughter called to inform us of her father being cold.

## 2020-09-07 PROCEDURE — 25010000002 ENOXAPARIN PER 10 MG: Performed by: NURSE PRACTITIONER

## 2020-09-07 PROCEDURE — 99232 SBSQ HOSP IP/OBS MODERATE 35: CPT | Performed by: INTERNAL MEDICINE

## 2020-09-07 RX ORDER — ASCORBIC ACID 500 MG
500 TABLET ORAL DAILY
Status: DISCONTINUED | OUTPATIENT
Start: 2020-09-08 | End: 2020-09-11 | Stop reason: HOSPADM

## 2020-09-07 RX ORDER — ZINC GLUCONATE 50 MG
50 TABLET ORAL DAILY
Status: DISCONTINUED | OUTPATIENT
Start: 2020-09-08 | End: 2020-09-11 | Stop reason: HOSPADM

## 2020-09-07 RX ADMIN — CETIRIZINE HYDROCHLORIDE 5 MG: 10 TABLET, FILM COATED ORAL at 08:00

## 2020-09-07 RX ADMIN — HYDRALAZINE HYDROCHLORIDE 50 MG: 25 TABLET, FILM COATED ORAL at 20:05

## 2020-09-07 RX ADMIN — ROSUVASTATIN CALCIUM 20 MG: 10 TABLET, FILM COATED ORAL at 20:05

## 2020-09-07 RX ADMIN — LEVETIRACETAM 500 MG: 500 TABLET ORAL at 08:00

## 2020-09-07 RX ADMIN — ENOXAPARIN SODIUM 40 MG: 40 INJECTION SUBCUTANEOUS at 16:09

## 2020-09-07 RX ADMIN — HYDRALAZINE HYDROCHLORIDE 50 MG: 25 TABLET, FILM COATED ORAL at 08:00

## 2020-09-07 RX ADMIN — AMLODIPINE BESYLATE 10 MG: 5 TABLET ORAL at 08:00

## 2020-09-07 RX ADMIN — Medication 10 ML: at 08:00

## 2020-09-07 RX ADMIN — FINASTERIDE 5 MG: 5 TABLET, FILM COATED ORAL at 08:00

## 2020-09-07 RX ADMIN — CALCIUM CARBONATE (ANTACID) CHEW TAB 500 MG 2 TABLET: 500 CHEW TAB at 20:05

## 2020-09-07 RX ADMIN — TAMSULOSIN HYDROCHLORIDE 0.8 MG: 0.4 CAPSULE ORAL at 08:00

## 2020-09-07 RX ADMIN — Medication 10 ML: at 20:06

## 2020-09-07 RX ADMIN — LEVETIRACETAM 500 MG: 500 TABLET ORAL at 20:06

## 2020-09-07 RX ADMIN — CLOPIDOGREL BISULFATE 75 MG: 75 TABLET ORAL at 08:00

## 2020-09-07 RX ADMIN — TRAZODONE HYDROCHLORIDE 50 MG: 50 TABLET ORAL at 20:05

## 2020-09-07 NOTE — PLAN OF CARE
Patient is alert and oriented.  Patient denies pain, patient is on room air.  Patient slept a good part of the night.  Patient can be needy at times with wanting us fix his blankets when you just left the room.  Patient is pleasant and thankful afterwards.  Patient is to be encouraged to do more for himself.  Will continue to monitor

## 2020-09-07 NOTE — PROGRESS NOTES
[facility] Medicine Services Daily Progress Note      Hospitalist Team  LOS 14 days      Patient Care Team:  Kelly Gill DO as PCP - General (Physical Medicine and Rehabilitation)    Patient Location: 203/1      Subjective   Subjective     Chief Complaint / Subjective  Chief Complaint   Patient presents with   • Weakness - Generalized         Brief Synopsis of Hospital Course/HPI  70-year-old man with history of CAD, dementia, CVA with left-sided residual weakness ,? CHF and hypertension.  According to admission note and talking to his daughter--> he was recently treated at [facility] for STEMI and rhabdomyolysis in the setting of acute kidney injury after a mechanical fall.  He was subsequently discharged to rehab facility for continued physical therapy. Apparently, he was discharged from rehab on 8/22/2020.  He presented to [facility] ED with  AMS/encephalopathy.  He endorses  mild chest discomfort  shortness of breath with scant  productive cough.  No reported fever, chills, no night sweats, and no abdominal pain nausea or vomiting.  On arrival to [facility], he was screened and tested positive for COVID-19 and subsequently placed on isolation.  Chest x-ray revealed patchy peripheral airspace opacity concerning for bilateral multifocal pneumonia.      Review of record here at [facility], [place], operated by [facility] showed multiple testing here on 8/14 and 8/15 which were negative.  However, he had another testing done on 8/23/2020 which was positive.   Otherwise, he remained clinically stable and oxygenating well on room air. Laboratory showed static, nonzero slight evaded troponin and EKG showed sinus rhythm with RBBB.        He was evaluated by cardiologist and ID.  Patient does not require Remdisivir with stable oxygenation on RA.  He was  maintained on isolation and treated with IV antibiotic with Zosyn, and later switched to  Augmentin for COVID related pneumonia possible secondary  bacterial infection.  The patient was felt to be stable for discharge however his daughter appealed to Medicare per nursing staff reporting that the patient's daughter wanted the patient's COVID test to be negative prior to discharge.     8/31/2020: The patient denies current complaints.  He is anxious to go home and is asking when his COVID test will be repeated.    9/1/2020: Patient denies current complaints except for feeling cold and asking for his blankets.  I spoke with the patient's daughter Maureen.  She is requesting repeat COVID testing so that if the patient is negative he will have more options for inpatient rehab since only one facility locally is taking COVID positive patients.  The COVID test was ordered resulted as positive.  Patient's daughter was notified.  9/2/2020: Patient denies c/o except that his bed is not comfortable.  He is anxious to go home.  He was counseled on the difficulties of sending him home when it is not safe for him to be there alone.   9/3/2020: Patient reports feeling cold but otherwise denies current complaints.  9/4/2020: The patient denies current complaints.  9/5/2020: Patient reports no current complaints.  He reports that he is too weak to walk.  9/6/2020: Patient denies current complaints except for some mild postnasal drainage, generalized weakness, and the bed is uncomfortable.  COVID test was performed again on 9/6/2020 because if it is negative he should be able to discharge to a noncovered facility.  If it is positive, he has to stay here because his daughter refuses to let him go to a COVID positive facility and he is not safe to discharge to home.  His COVID test was positive again today.    9/7/20 no new complaints, denies SOA, cough or chest pain, awaiting placement, family appealed discharge      Review of Systems   HENT: Negative.    Eyes: Negative.    Cardiovascular: Negative.    Respiratory: Negative.    Endocrine: Negative.    Hematologic/Lymphatic:  "Negative.    Skin: Negative.    Musculoskeletal: Negative.    Gastrointestinal: Negative.    Genitourinary: Negative.    Neurological: Positive for weakness.   Psychiatric/Behavioral: Negative.    Allergic/Immunologic: Negative.    All other systems reviewed and are negative.    12 point review of systems was reviewed and was negative.    Objective   Objective      Vital Signs  Temp:  [97 °F (36.1 °C)-97.9 °F (36.6 °C)] 97.7 °F (36.5 °C)  Heart Rate:  [59-80] 71  Resp:  [17-20] 20  BP: (122-143)/(65-85) 138/77  Oxygen Therapy  SpO2: 96 %  Pulse Oximetry Type: Intermittent  Device (Oxygen Therapy): room air  Device (Oxygen Therapy): room air  Flow (L/min): 2  Flowsheet Rows      First Filed Value   Admission Height  182.9 cm (72\") Documented at 08/23/2020 0314   Admission Weight  78 kg (171 lb 15.3 oz) Documented at 08/23/2020 0314        Intake & Output (last 3 days)       09/04 0701 - 09/05 0700 09/05 0701 - 09/06 0700 09/06 0701 - 09/07 0700 09/07 0701 - 09/08 0700    P.O. 780 1080 780     I.V. (mL/kg)  0 (0)      Total Intake(mL/kg) 780 (8.8) 1080 (12.2) 780 (8.8)     Urine (mL/kg/hr) 375 (0.2) 630 (0.3) 630 (0.3)     Total Output 375 630 630     Net +405 +450 +150             Stool Unmeasured Occurrence    3 x        Lines, Drains & Airways    Active LDAs     Name:   Placement date:   Placement time:   Site:   Days:    Peripheral IV 08/29/20 Posterior;Right Forearm   08/29/20    --    Forearm   5                Physical Exam   well-developed well-nourished gentleman lying in bed comfortable on room air awake and alert in no acute distress; mucous membranes moist; lungs clear to auscultation bilaterally; CV regular rate and rhythm; abdomen soft and nontender; extremities with no edema or calf tenderness  Procedures:              Results Review:     I reviewed the patient's new clinical results.      Lab Results (last 24 hours)     Procedure Component Value Units Date/Time    Respiratory Panel PCR " w/COVID-19(SARS-CoV-2) [facility]/NANDINI/ANA/PAD/[facility]/MAD In-House, NP Swab in UTM/VTM, 3-4 HR TAT - Swab, Nasopharynx [[id number]]  (Abnormal) Collected:  09/06/20 1632    Specimen:  Swab from Nasopharynx Updated:  09/06/20 1832     ADENOVIRUS, PCR Not Detected     Coronavirus 229E Not Detected     Coronavirus HKU1 Not Detected     Coronavirus NL63 Not Detected     Coronavirus OC43 Not Detected     COVID19 Detected     Human Metapneumovirus Not Detected     Human Rhinovirus/Enterovirus Not Detected     Influenza A PCR Not Detected     Influenza A H1 Not Detected     Influenza A H1 2009 PCR Not Detected     Influenza A H3 Not Detected     Influenza B PCR Not Detected     Parainfluenza Virus 1 Not Detected     Parainfluenza Virus 2 Not Detected     Parainfluenza Virus 3 Not Detected     Parainfluenza Virus 4 Not Detected     RSV, PCR Not Detected     Bordetella pertussis pcr Not Detected     Bordetella parapertussis PCR Not Detected     Chlamydophila pneumoniae PCR Not Detected     Mycoplasma pneumo by PCR Not Detected    Narrative:       Fact sheet for providers: https://docs.[employer]/wp-content/uploads/OUA4216-9846-YN1.1-EUA-Provider-Fact-Sheet-3.pdf    Fact sheet for patients: https://docs.[employer]/wp-content/uploads/WXG4442-0596-TY2.1-EUA-Patient-Fact-Sheet-1.pdf        No results found for: HGBA1C            No results found for: LIPASE  No results found for: CHOL, CHLPL, TRIG, HDL, LDL, LDLDIRECT    No results found for: INTRAOP, PREDX, FINALDX, COMDX    Microbiology Results (last 10 days)     Procedure Component Value - Date/Time    Respiratory Panel PCR w/COVID-19(SARS-CoV-2) [facility]/NANDINI/ANA/PAD/COR/MAD In-House, NP Swab in UTM/VTM, 3-4 HR TAT - Swab, Nasopharynx [[id number]]  (Abnormal) Collected:  09/06/20 1632    Lab Status:  Final result Specimen:  Swab from Nasopharynx Updated:  09/06/20 1832     ADENOVIRUS, PCR Not Detected     Coronavirus 229E Not Detected     Coronavirus HKU1 Not Detected     Coronavirus  NL63 Not Detected     Coronavirus OC43 Not Detected     COVID19 Detected     Human Metapneumovirus Not Detected     Human Rhinovirus/Enterovirus Not Detected     Influenza A PCR Not Detected     Influenza A H1 Not Detected     Influenza A H1 2009 PCR Not Detected     Influenza A H3 Not Detected     Influenza B PCR Not Detected     Parainfluenza Virus 1 Not Detected     Parainfluenza Virus 2 Not Detected     Parainfluenza Virus 3 Not Detected     Parainfluenza Virus 4 Not Detected     RSV, PCR Not Detected     Bordetella pertussis pcr Not Detected     Bordetella parapertussis PCR Not Detected     Chlamydophila pneumoniae PCR Not Detected     Mycoplasma pneumo by PCR Not Detected    Narrative:       Fact sheet for providers: https://docs.[employer]/wp-content/uploads/SRF7805-4572-DF9.1-EUA-Provider-Fact-Sheet-3.pdf    Fact sheet for patients: https://docs.[employer]/wp-content/uploads/CVV9526-2471-PB0.1-EUA-Patient-Fact-Sheet-1.pdf    Respiratory Panel PCR w/COVID-19(SARS-CoV-2) MAIRA/NANDINI/ANA/PAD/COR/MAD In-House, NP Swab in UTM/VTM, 3-4 HR TAT - Swab, Nasopharynx [[id number]]  (Abnormal) Collected:  09/01/20 1424    Lab Status:  Final result Specimen:  Swab from Nasopharynx Updated:  09/01/20 1558     ADENOVIRUS, PCR Not Detected     Coronavirus 229E Not Detected     Coronavirus HKU1 Not Detected     Coronavirus NL63 Not Detected     Coronavirus OC43 Not Detected     COVID19 Detected     Human Metapneumovirus Not Detected     Human Rhinovirus/Enterovirus Not Detected     Influenza A PCR Not Detected     Influenza A H1 Not Detected     Influenza A H1 2009 PCR Not Detected     Influenza A H3 Not Detected     Influenza B PCR Not Detected     Parainfluenza Virus 1 Not Detected     Parainfluenza Virus 2 Not Detected     Parainfluenza Virus 3 Not Detected     Parainfluenza Virus 4 Not Detected     RSV, PCR Not Detected     Bordetella pertussis pcr Not Detected     Bordetella parapertussis PCR Not Detected      Chlamydophila pneumoniae PCR Not Detected     Mycoplasma pneumo by PCR Not Detected    Narrative:       Fact sheet for providers: https://docs.[employer]jing/wp-content/uploads/LEW3864-9874-AM9.1-EUA-Provider-Fact-Sheet-3.pdf    Fact sheet for patients: https://docs.[employer]/wp-content/uploads/TZB0002-8194-UD0.1-EUA-Patient-Fact-Sheet-1.pdf          ECG/EMG Results (most recent)     Procedure Component Value Units Date/Time    ECG 12 Lead [[id number]] Collected:  08/23/20 0619     Updated:  08/23/20 0621    Narrative:       HEART RATE= 91  bpm  RR Interval= 660  ms  NC Interval= 163  ms  P Horizontal Axis= 24  deg  P Front Axis= 64  deg  QRSD Interval= 152  ms  QT Interval= 382  ms  QRS Axis= -83  deg  T Wave Axis= -32  deg  - ABNORMAL ECG -  Sinus rhythm  RBBB and LAFB  Probable lateral infarct, age indeterminate  Electronically Signed By:   Date and Time of Study: 2020-08-23 06:19:41    ECG 12 Lead [[id number]] Collected:  08/26/20 1325     Updated:  08/31/20 2032    Narrative:       HEART RATE= 98  bpm  RR Interval= 612  ms  NC Interval= 163  ms  P Horizontal Axis= 40  deg  P Front Axis= 60  deg  QRSD Interval= 154  ms  QT Interval= 396  ms  QRS Axis= 258  deg  T Wave Axis= -53  deg  - ABNORMAL ECG -  Sinus rhythm  Ventricular premature complex  Inferior infarct, old  When compared with ECG of 23-Aug-2020 6:19:41,  No significant change  Electronically Signed By: Jorge Chacon (ANA) 31-Aug-2020 20:08:03  Date and Time of Study: 2020-08-26 13:25:06                    No radiology results for the last 7 days        Xrays, labs reviewed personally by physician.    Medication Review:   I have reviewed the patient's current medication list      Scheduled Meds    amLODIPine 10 mg Oral Daily   cetirizine 5 mg Oral Daily   clopidogrel 75 mg Oral Daily   enoxaparin 40 mg Subcutaneous Q24H   finasteride 5 mg Oral Daily   hydrALAZINE 50 mg Oral BID   levETIRAcetam 500 mg Oral BID   rosuvastatin 20 mg Oral Daily    sodium chloride 10 mL Intravenous Q12H   tamsulosin 0.8 mg Oral Daily   traZODone 50 mg Oral Nightly       Meds Infusions       Meds PRN  •  acetaminophen **OR** acetaminophen **OR** acetaminophen  •  benzonatate  •  calcium carbonate  •  melatonin  •  ondansetron **OR** ondansetron  •  potassium chloride  •  potassium chloride  •  sodium chloride        Assessment/Plan   Assessment/Plan     Active Hospital Problems    Diagnosis  POA   • **Pneumonia due to COVID-19 virus [U07.1, J12.89]  Unknown   • Delirium [R41.0]  Yes   • Weakness [R53.1]  Yes      Resolved Hospital Problems   No resolved problems to display.       MEDICAL DECISION MAKING COMPLEXITY BY PROBLEM:     Assessment and plan:  Acute COVID-19 pneumonia   -Patient retested and was still positive for COVID-19 on 9/1/2020    Possible secondary bacterial pneumonia, resolved        -oxygenating well on room air          -completed IV Zosyn followed by Augmentin     Coronary disease, recent non-ST elevation MI         -on Amlodipine/Plavix/hydralazine/Crestor         -Cardiology saw the patient in consultation and the patient is felt to be stable; cardiology has signed off             Essential hypertension, chronic and controlled         -Continue amlodipine /hydralazine      BPH       -Continue Proscar/Flomax     History of CVA with residual left-sided hemiparesis        -Continue Plavix/Crestor, and bedside range of motion as tolerated     Disposition:  He was discharged 8/28/2020, however, daughter contested discharge which was upheld and then she appealed the denial. Brenna reviewed the appeal and on reconsideration it was determined that the patient services should continue without liability until further notice.  I spoke with Melisa Jones utilization review coordinator with  at [facility] and she is going to be contacting Brenna for further clarity.        VTE Prophylaxis -   Mechanical Order History:     None       Pharmalogical Order History:     Ordered     Dose Route Frequency Stop    08/23/20 0559  enoxaparin (LOVENOX) syringe 40 mg      40 mg SC Every 24 Hours --            Code Status -   Code Status and Medical Interventions:   Ordered at: 08/23/20 0559     Code Status:    CPR     Medical Interventions (Level of Support Prior to Arrest):    Full       This patient has been examined wearing appropriate Personal Protective Equipment  09/07/20        Discharge Planning  Pending rehab placement          Electronically signed by Tone Dia MD, 09/07/20, 11:35.  [facility] Hospitalist Team

## 2020-09-08 PROCEDURE — 97164 PT RE-EVAL EST PLAN CARE: CPT

## 2020-09-08 PROCEDURE — 97535 SELF CARE MNGMENT TRAINING: CPT

## 2020-09-08 PROCEDURE — 25010000002 ENOXAPARIN PER 10 MG: Performed by: NURSE PRACTITIONER

## 2020-09-08 PROCEDURE — 97168 OT RE-EVAL EST PLAN CARE: CPT

## 2020-09-08 PROCEDURE — 97110 THERAPEUTIC EXERCISES: CPT

## 2020-09-08 PROCEDURE — 99231 SBSQ HOSP IP/OBS SF/LOW 25: CPT | Performed by: INTERNAL MEDICINE

## 2020-09-08 PROCEDURE — 97530 THERAPEUTIC ACTIVITIES: CPT

## 2020-09-08 RX ORDER — LOSARTAN POTASSIUM 50 MG/1
50 TABLET ORAL
Status: DISCONTINUED | OUTPATIENT
Start: 2020-09-08 | End: 2020-09-11 | Stop reason: HOSPADM

## 2020-09-08 RX ADMIN — CALCIUM CARBONATE (ANTACID) CHEW TAB 500 MG 2 TABLET: 500 CHEW TAB at 20:29

## 2020-09-08 RX ADMIN — AMLODIPINE BESYLATE 10 MG: 5 TABLET ORAL at 09:17

## 2020-09-08 RX ADMIN — LOSARTAN POTASSIUM 50 MG: 50 TABLET, FILM COATED ORAL at 17:49

## 2020-09-08 RX ADMIN — CALCIUM CARBONATE (ANTACID) CHEW TAB 500 MG 2 TABLET: 500 CHEW TAB at 12:44

## 2020-09-08 RX ADMIN — ROSUVASTATIN CALCIUM 20 MG: 10 TABLET, FILM COATED ORAL at 20:29

## 2020-09-08 RX ADMIN — CETIRIZINE HYDROCHLORIDE 5 MG: 10 TABLET, FILM COATED ORAL at 09:16

## 2020-09-08 RX ADMIN — HYDRALAZINE HYDROCHLORIDE 50 MG: 25 TABLET, FILM COATED ORAL at 09:17

## 2020-09-08 RX ADMIN — ENOXAPARIN SODIUM 40 MG: 40 INJECTION SUBCUTANEOUS at 17:49

## 2020-09-08 RX ADMIN — Medication 5 MG: at 20:29

## 2020-09-08 RX ADMIN — CLOPIDOGREL BISULFATE 75 MG: 75 TABLET ORAL at 09:17

## 2020-09-08 RX ADMIN — Medication 10 ML: at 09:16

## 2020-09-08 RX ADMIN — TAMSULOSIN HYDROCHLORIDE 0.8 MG: 0.4 CAPSULE ORAL at 09:16

## 2020-09-08 RX ADMIN — OXYCODONE HYDROCHLORIDE AND ACETAMINOPHEN 500 MG: 500 TABLET ORAL at 09:17

## 2020-09-08 RX ADMIN — TRAZODONE HYDROCHLORIDE 50 MG: 50 TABLET ORAL at 20:29

## 2020-09-08 RX ADMIN — Medication 50 MG: at 09:17

## 2020-09-08 RX ADMIN — FINASTERIDE 5 MG: 5 TABLET, FILM COATED ORAL at 09:17

## 2020-09-08 RX ADMIN — Medication 10 ML: at 20:29

## 2020-09-08 RX ADMIN — LEVETIRACETAM 500 MG: 500 TABLET ORAL at 20:29

## 2020-09-08 RX ADMIN — HYDRALAZINE HYDROCHLORIDE 50 MG: 25 TABLET, FILM COATED ORAL at 20:29

## 2020-09-08 RX ADMIN — LEVETIRACETAM 500 MG: 500 TABLET ORAL at 09:17

## 2020-09-08 NOTE — PROGRESS NOTES
[facility] Medicine Services Daily Progress Note      Hospitalist Team  LOS 15 days      Patient Care Team:  Kelly Gill DO as PCP - General (Physical Medicine and Rehabilitation)    Patient Location: 203/1      Subjective   Subjective     Chief Complaint / Subjective  Chief Complaint   Patient presents with   • Weakness - Generalized         Brief Synopsis of Hospital Course/HPI  70-year-old man with history of CAD, dementia, CVA with left-sided residual weakness ,? CHF and hypertension.  According to admission note and talking to his daughter--> he was recently treated at [facility] for STEMI and rhabdomyolysis in the setting of acute kidney injury after a mechanical fall.  He was subsequently discharged to rehab facility for continued physical therapy. Apparently, he was discharged from rehab on 8/22/2020.  He presented to [facility] ED with  AMS/encephalopathy.  He endorses  mild chest discomfort  shortness of breath with scant  productive cough.  No reported fever, chills, no night sweats, and no abdominal pain nausea or vomiting.  On arrival to [facility], he was screened and tested positive for COVID-19 and subsequently placed on isolation.  Chest x-ray revealed patchy peripheral airspace opacity concerning for bilateral multifocal pneumonia.      Review of record here at [facility] showed multiple testing here on 8/14 and 8/15 which were negative.  However, he had another testing done on 8/23/2020 which was positive.   Otherwise, he remained clinically stable and oxygenating well on room air. Laboratory showed static, nonzero slight evaded troponin and EKG showed sinus rhythm with RBBB.        He was evaluated by cardiologist and ID.  Patient does not require Remdisivir with stable oxygenation on RA.  He was  maintained on isolation and treated with IV antibiotic with Zosyn, and later switched to  Augmentin for COVID related pneumonia possible secondary  bacterial infection.  The patient was felt to be stable for discharge however his daughter appealed to Medicare per nursing staff reporting that the patient's daughter wanted the patient's COVID test to be negative prior to discharge.     8/31/2020: The patient denies current complaints.  He is anxious to go home and is asking when his COVID test will be repeated.    9/1/2020: Patient denies current complaints except for feeling cold and asking for his blankets.  I spoke with the patient's daughter Maureen.  She is requesting repeat COVID testing so that if the patient is negative he will have more options for inpatient rehab since only one facility locally is taking COVID positive patients.  The COVID test was ordered resulted as positive.  Patient's daughter was notified.  9/2/2020: Patient denies c/o except that his bed is not comfortable.  He is anxious to go home.  He was counseled on the difficulties of sending him home when it is not safe for him to be there alone.   9/3/2020: Patient reports feeling cold but otherwise denies current complaints.  9/4/2020: The patient denies current complaints.  9/5/2020: Patient reports no current complaints.  He reports that he is too weak to walk.  9/6/2020: Patient denies current complaints except for some mild postnasal drainage, generalized weakness, and the bed is uncomfortable.  COVID test was performed again on 9/6/2020 because if it is negative he should be able to discharge to a noncovered facility.  If it is positive, he has to stay here because his daughter refuses to let him go to a COVID positive facility and he is not safe to discharge to home.  His COVID test was positive again today.    9/7/20 no new complaints, denies SOA, cough or chest pain, awaiting placement, family appealed discharge  9/8/20-no new complaints, no events overnight VSS, DW with family.    Review of Systems   HENT: Negative.    Eyes: Negative.    Cardiovascular: Negative.    Respiratory:  "Negative.    Endocrine: Negative.    Hematologic/Lymphatic: Negative.    Skin: Negative.    Musculoskeletal: Negative.    Gastrointestinal: Negative.    Genitourinary: Negative.    Neurological: Positive for weakness.   Psychiatric/Behavioral: Negative.    Allergic/Immunologic: Negative.    All other systems reviewed and are negative.    12 point review of systems was reviewed and was negative.    Objective   Objective      Vital Signs  Temp:  [97.7 °F (36.5 °C)-98.3 °F (36.8 °C)] 98.3 °F (36.8 °C)  Heart Rate:  [] 64  Resp:  [17-18] 17  BP: (126-160)/(73-87) 136/78  Oxygen Therapy  SpO2: 96 %  Pulse Oximetry Type: Intermittent  Device (Oxygen Therapy): room air  Device (Oxygen Therapy): room air  Flow (L/min): 2  Flowsheet Rows      First Filed Value   Admission Height  182.9 cm (72\") Documented at 08/23/2020 0314   Admission Weight  78 kg (171 lb 15.3 oz) Documented at 08/23/2020 0314        Intake & Output (last 3 days)       09/05 0701 - 09/06 0700 09/06 0701 - 09/07 0700 09/07 0701 - 09/08 0700 09/08 0701 - 09/09 0700    P.O. 1080 780 720 160    I.V. (mL/kg) 0 (0)       Total Intake(mL/kg) 1080 (12.2) 780 (8.8) 720 (8.2) 160 (1.8)    Urine (mL/kg/hr) 630 (0.3) 630 (0.3) 575 (0.3) 100 (0.2)    Stool   0     Total Output 630 630 575 100    Net +450 +150 +145 +60            Stool Unmeasured Occurrence   3 x         Lines, Drains & Airways    Active LDAs     Name:   Placement date:   Placement time:   Site:   Days:    Peripheral IV 08/29/20 Posterior;Right Forearm   08/29/20    --    Forearm   5              Physical Exam   well-developed well-nourished gentleman lying in bed comfortable on room air awake and alert in no acute distress; mucous membranes moist; lungs clear to auscultation bilaterally; CV regular rate and rhythm; abdomen soft and nontender; extremities with no edema or calf tenderness  Procedures:    Results Review:     I reviewed the patient's new clinical results.    Lab Results (last 24 " hours)     ** No results found for the last 24 hours. **        No results found for: HGBA1C            No results found for: LIPASE  No results found for: CHOL, CHLPL, TRIG, HDL, LDL, LDLDIRECT    No results found for: INTRAOP, PREDX, FINALDX, COMDX    Microbiology Results (last 10 days)     Procedure Component Value - Date/Time    Respiratory Panel PCR w/COVID-19(SARS-CoV-2) MAIRA/NANDINI/NAA/PAD/COR/MAD In-House, NP Swab in UTM/VTM, 3-4 HR TAT - Swab, Nasopharynx [[id number]]  (Abnormal) Collected:  09/06/20 1632    Lab Status:  Final result Specimen:  Swab from Nasopharynx Updated:  09/06/20 1832     ADENOVIRUS, PCR Not Detected     Coronavirus 229E Not Detected     Coronavirus HKU1 Not Detected     Coronavirus NL63 Not Detected     Coronavirus OC43 Not Detected     COVID19 Detected     Human Metapneumovirus Not Detected     Human Rhinovirus/Enterovirus Not Detected     Influenza A PCR Not Detected     Influenza A H1 Not Detected     Influenza A H1 2009 PCR Not Detected     Influenza A H3 Not Detected     Influenza B PCR Not Detected     Parainfluenza Virus 1 Not Detected     Parainfluenza Virus 2 Not Detected     Parainfluenza Virus 3 Not Detected     Parainfluenza Virus 4 Not Detected     RSV, PCR Not Detected     Bordetella pertussis pcr Not Detected     Bordetella parapertussis PCR Not Detected     Chlamydophila pneumoniae PCR Not Detected     Mycoplasma pneumo by PCR Not Detected    Narrative:       Fact sheet for providers: https://docs.Ubooly/wp-content/uploads/YBL3139-0903-SO7.1-EUA-Provider-Fact-Sheet-3.pdf    Fact sheet for patients: https://docs.Ubooly/wp-content/uploads/DUA2874-8562-NB6.1-EUA-Patient-Fact-Sheet-1.pdf    Respiratory Panel PCR w/COVID-19(SARS-CoV-2) MAIRA/NANDINI/ANA/PAD/COR/MAD In-House, NP Swab in UTM/VTM, 3-4 HR TAT - Swab, Nasopharynx [[id number]]  (Abnormal) Collected:  09/01/20 1424    Lab Status:  Final result Specimen:  Swab from Nasopharynx Updated:  09/01/20 1557      ADENOVIRUS, PCR Not Detected     Coronavirus 229E Not Detected     Coronavirus HKU1 Not Detected     Coronavirus NL63 Not Detected     Coronavirus OC43 Not Detected     COVID19 Detected     Human Metapneumovirus Not Detected     Human Rhinovirus/Enterovirus Not Detected     Influenza A PCR Not Detected     Influenza A H1 Not Detected     Influenza A H1 2009 PCR Not Detected     Influenza A H3 Not Detected     Influenza B PCR Not Detected     Parainfluenza Virus 1 Not Detected     Parainfluenza Virus 2 Not Detected     Parainfluenza Virus 3 Not Detected     Parainfluenza Virus 4 Not Detected     RSV, PCR Not Detected     Bordetella pertussis pcr Not Detected     Bordetella parapertussis PCR Not Detected     Chlamydophila pneumoniae PCR Not Detected     Mycoplasma pneumo by PCR Not Detected    Narrative:       Fact sheet for providers: https://docs.Feedbooks/wp-content/uploads/CHV1057-4596-ZI4.1-EUA-Provider-Fact-Sheet-3.pdf    Fact sheet for patients: https://docs.Feedbooks/wp-content/uploads/FDJ4371-0039-YW4.1-EUA-Patient-Fact-Sheet-1.pdf          ECG/EMG Results (most recent)     Procedure Component Value Units Date/Time    ECG 12 Lead [[id number]] Collected:  08/23/20 0619     Updated:  08/23/20 0621    Narrative:       HEART RATE= 91  bpm  RR Interval= 660  ms  FL Interval= 163  ms  P Horizontal Axis= 24  deg  P Front Axis= 64  deg  QRSD Interval= 152  ms  QT Interval= 382  ms  QRS Axis= -83  deg  T Wave Axis= -32  deg  - ABNORMAL ECG -  Sinus rhythm  RBBB and LAFB  Probable lateral infarct, age indeterminate  Electronically Signed By:   Date and Time of Study: 2020-08-23 06:19:41    ECG 12 Lead [[id number]] Collected:  08/26/20 1325     Updated:  08/31/20 2032    Narrative:       HEART RATE= 98  bpm  RR Interval= 612  ms  FL Interval= 163  ms  P Horizontal Axis= 40  deg  P Front Axis= 60  deg  QRSD Interval= 154  ms  QT Interval= 396  ms  QRS Axis= 258  deg  T Wave Axis= -53  deg  - ABNORMAL ECG -  Sinus  rhythm  Ventricular premature complex  Inferior infarct, old  When compared with ECG of 23-Aug-2020 6:19:41,  No significant change  Electronically Signed By: Jorge Chacon (ANA) 31-Aug-2020 20:08:03  Date and Time of Study: 2020-08-26 13:25:06              No radiology results for the last 7 days    Xrays, labs reviewed personally by physician.  Medication Review:   I have reviewed the patient's current medication list    Scheduled Meds    amLODIPine 10 mg Oral Daily   cetirizine 5 mg Oral Daily   clopidogrel 75 mg Oral Daily   enoxaparin 40 mg Subcutaneous Q24H   finasteride 5 mg Oral Daily   hydrALAZINE 50 mg Oral BID   levETIRAcetam 500 mg Oral BID   rosuvastatin 20 mg Oral Daily   sodium chloride 10 mL Intravenous Q12H   tamsulosin 0.8 mg Oral Daily   traZODone 50 mg Oral Nightly   vitamin C 500 mg Oral Daily   zinc gluconate 50 mg Oral Daily     Meds Infusions     Meds PRN  •  acetaminophen **OR** acetaminophen **OR** acetaminophen  •  benzonatate  •  calcium carbonate  •  melatonin  •  ondansetron **OR** ondansetron  •  potassium chloride  •  potassium chloride  •  sodium chloride    Assessment/Plan   Assessment/Plan     Active Hospital Problems    Diagnosis  POA   • **Pneumonia due to COVID-19 virus [U07.1, J12.89]  Unknown   • Delirium [R41.0]  Yes   • Weakness [R53.1]  Yes      Resolved Hospital Problems   No resolved problems to display.     MEDICAL DECISION MAKING COMPLEXITY BY PROBLEM:     Assessment and plan:  Acute COVID-19 pneumonia   -Patient retested and was still positive for COVID-19 on 9/1/2020    Possible secondary bacterial pneumonia, resolved        -oxygenating well on room air          -completed IV Zosyn followed by Augmentin     Coronary disease, recent non-ST elevation MI         -on Amlodipine/Plavix/hydralazine/Crestor         -Cardiology saw the patient in consultation and the patient is felt to be stable; cardiology has signed off             Essential hypertension, chronic and  controlled         -Continue amlodipine /hydralazine      BPH       -Continue Proscar/Flomax     History of CVA with residual left-sided hemiparesis        -Continue Plavix/Crestor, and bedside range of motion as tolerated     Disposition:  He was discharged 8/28/2020, however, daughter contested discharge which was upheld and then she appealed the denial. Brenna reviewed the appeal and on reconsideration it was determined that the patient services should continue without liability until further notice.  I spoke with Melisa Jones utilization review coordinator with  at [facility] and she is going to be contacting Brenna for further clarity.    9/8/2020-JF RN and daughter, not wanting patient  to go back home, awaiting  input regarding discahrge        VTE Prophylaxis -   Mechanical Order History:     None      Pharmalogical Order History:     Ordered     Dose Route Frequency Stop    08/23/20 0559  enoxaparin (LOVENOX) syringe 40 mg      40 mg SC Every 24 Hours --        Code Status -   Code Status and Medical Interventions:   Ordered at: 08/23/20 0559     Code Status:    CPR     Medical Interventions (Level of Support Prior to Arrest):    Full     This patient has been examined wearing appropriate Personal Protective Equipment  09/08/20    Discharge Planning  Pending rehab placement    Electronically signed by Tone Dia MD, 09/08/20, 12:15.  [facility] Hospitalist Team

## 2020-09-08 NOTE — PLAN OF CARE
"  Problem: Patient Care Overview  Goal: Plan of Care Review  Outcome: Ongoing (interventions implemented as appropriate)  Flowsheets  Taken 9/8/2020 1643 by Robert Christine RN  Progress: no change  Outcome Summary: pt is not cooperative. pt states \"i am a  i deserve more care than your other patients.\" a&o x4. awaiting decision from daughter on placement. medically stable. RA. worked w/ PT & OT today. L sided paralysis. uses cane. will continue to monitor.  Taken 9/8/2020 1500 by Irene Lara OT  Plan of Care Reviewed With: patient  Goal: Individualization and Mutuality  Outcome: Ongoing (interventions implemented as appropriate)  Goal: Discharge Needs Assessment  Outcome: Ongoing (interventions implemented as appropriate)  Goal: Interprofessional Rounds/Family Conf  Outcome: Ongoing (interventions implemented as appropriate)     Problem: Fall Risk (Adult)  Goal: Identify Related Risk Factors and Signs and Symptoms  Outcome: Ongoing (interventions implemented as appropriate)     Problem: Skin Injury Risk (Adult)  Goal: Identify Related Risk Factors and Signs and Symptoms  Outcome: Ongoing (interventions implemented as appropriate)  Goal: Skin Health and Integrity  Outcome: Ongoing (interventions implemented as appropriate)     "

## 2020-09-08 NOTE — PLAN OF CARE
Problem: Patient Care Overview  Goal: Plan of Care Review  Outcome: Ongoing (interventions implemented as appropriate)  Flowsheets (Taken 9/8/2020 1500)  Outcome Summary: Pt is 69 yo male adm for pna, (+) covid 19. Hx of recent nstemi, fall, and rhabdomyolysis.  Pt with hx of CVA with Lsided hemiparesis. Pt had been at rehab and was home <2 days when admitted to [facility] for covid19. Pt lives alone, but is normally able to dress himself and amb short distances in home (~20 ft) to/from bathroom. Uses power w/c otherwise. Is independent in transfers at baseline. Pt reports having caregiver intermittently (3x/week) throughout week to assist with shower, shaving, and IADLs.  Pt re-eval at this time secondary to length of stay.  This date, pt in bed wihtout clothing on upon OT arrival. Pt very frustrated with hospital and derogatory toward therapist due to lack of  background. Pt states extreme frustration that he is not given priority status due to his  status, and states his discharge is limited due to limited resources for veterans. Pt eventually agreeable to work with OT when encouraged that increased activity may help facilitate discharge. Pt Mod A for bed mobility, with little/no attempt to manipulate LUE/LE during transfers. Completes 3 sit>stand transfers from elevated height with min A. Pt stands no longer than 30 seconds, and only 10 seconds last two attempts. Pt dependent for LB dressing this date and requires Mod A for UBdressing. Perseverative on increased independence at home due to having power wheelchair. OT encouraged in chair activity to increase activity tolerance, but pt declines. Tachycardic with EOB and standing movement. Pt anasognostic, impulsive, and unsafe to return home with minimal, intermittent assistance. OT continues to recommend IP rehab at discharge. PPE: gloves, N95, surgical mask, shoe covers, bonnet, eyewear, face shield, gown

## 2020-09-08 NOTE — NURSING NOTE
Daughter called. Call missed. MD notified that daughter wants to speak to him. Attempted to call daughter back. No answer.

## 2020-09-08 NOTE — THERAPY RE-EVALUATION
Acute Care - Occupational Therapy Re-Evaluation   [place]     Patient Name: Ivan Maradiaga  : 1950  MRN: [id number]  Today's Date: 2020             Admit Date: 2020       ICD-10-CM ICD-9-CM   1. Delirium R41.0 780.09   2. Pneumonia due to COVID-19 virus U07.1 480.8    J12.89    3. Weakness R53.1 780.79     Patient Active Problem List   Diagnosis   • Delirium   • Weakness   • Pneumonia due to COVID-19 virus     Past Medical History:   Diagnosis Date   • CAD (coronary artery disease)    • CHF (congestive heart failure) (CMS/[facility])    • Dementia (CMS/[facility])    • Hypertension      History reviewed. No pertinent surgical history.       OT ASSESSMENT FLOWSHEET (last 12 hours)      Occupational Therapy Evaluation     Row Name 20 1500                   OT Evaluation Time/Intention    Document Type  evaluation  -ES        Mode of Treatment  occupational therapy  -ES        Patient Effort  adequate  -ES           General Information    Patient Profile Reviewed?  yes  -ES        Prior Level of Function  independent:;w/c or scooter  -ES        Equipment Currently Used at Home  wheelchair, motorized  -ES        Pertinent History of Current Functional Problem  Pt is 69 yo male adm for pna, (+) covid 19. Hx of recent nstemi, fall, and rhabdomyolysis.  Pt with hx of CVA with Lsided hemiparesis. Pt had been at rehab and was home <2 days when admitted to [facility] for covid19. . Pt lives alone, but is normally able to dress himself and amb short distances in home (~20 ft) to/from bathroom. Uses power w/c otherwise. Is independent in transfers at baseline. Pt reports having caregiver intermittently (3x/week) throughout week to assist with shower, shaving, and IADLs.  Pt re-eval at this time secondary to length of stay.  -ES        Existing Precautions/Restrictions  fall  -ES        Limitations/Impairments  insensate body part  -ES        Barriers to Rehab  medically complex  -ES           Relationship/Environment    Lives  With  alone  -ES        Family Caregiver if Needed  other (see comments)  -ES        Primary Roles/Responsibilities  disabled  -ES           Resource/Environmental Concerns    Current Living Arrangements  home/apartment/condo  -ES        Resource/Environmental Concerns  none  -ES           Home Main Entrance    Number of Stairs, Main Entrance  none  -ES           Stairs Within Home, Primary    Number of Stairs, Within Home, Primary  none  -ES           Cognitive Assessment/Intervention- PT/OT    Affect/Mental Status (Cognitive)  agitated  -ES        Behavioral Issues (Cognitive)  verbal outbursts  -ES        Orientation Status (Cognition)  oriented x 3  -ES        Follows Commands (Cognition)  follows one step commands;50-74% accuracy  -ES        Cognitive Function (Cognitive)  attention deficit;safety deficit;memory deficit;executive function deficit  -ES        Attention Deficit (Cognitive)  mild deficit  -ES        Executive Function Deficit (Cognition)  mild deficit  -ES        Memory Deficit (Cognitive)  moderate deficit  -ES        Safety Deficit (Cognitive)  moderate deficit  -ES           Safety Issues, Functional Mobility    Safety Issues Affecting Function (Mobility)  awareness of need for assistance;impulsivity;insight into deficits/self awareness;judgment;positioning of assistive device;problem solving;safety precautions follow-through/compliance;safety precaution awareness;sequencing abilities  -ES           Bed Mobility Assessment/Treatment    Bed Mobility Assessment/Treatment  supine-sit;sit-supine  -ES        Supine-Sit [place] (Bed Mobility)  moderate assist (50% patient effort)  -ES        Sit-Supine [place] (Bed Mobility)  moderate assist (50% patient effort)  -ES        Comment (Bed Mobility)  Pt requires A for LUE and LLE for positioning and bed mobility.  -ES           Transfer Assessment/Treatment    Transfer Assessment/Treatment  sit-stand transfer;stand-sit transfer  -ES            Chair-Bed Transfer    Chair-Bed [place] (Transfers)  maximum assist (25% patient effort)  -ES           Sit-Stand Transfer    Sit-Stand [place] (Transfers)  minimum assist (75% patient effort)  -ES        Assistive Device (Sit-Stand Transfers)  cane, quad  -ES           Stand-Sit Transfer    Stand-Sit [place] (Transfers)  minimum assist (75% patient effort)  -ES        Assistive Device (Stand-Sit Transfers)  cane, quad  -ES           ADL Assessment/Intervention    BADL Assessment/Intervention  upper body dressing;lower body dressing;feeding;toileting  -ES           Upper Body Dressing Assessment/Training    Upper Body Dressing [place] Level  moderate assist (50% patient effort)  -ES           Lower Body Dressing Assessment/Training    Lower Body Dressing [place] Level  dependent (less than 25% patient effort)  -ES        Comment (Lower Body Dressing)  donning/doffing shoes. Pt refuses to attempt  -ES           Self-Feeding Assessment/Training    [place] Level (Feeding)  set up  -ES           Toileting Assessment/Training    [place] Level (Toileting)  minimum assist (75% patient effort)  -ES        Assistive Devices (Toileting)  urinal  -ES           General ROM    GENERAL ROM COMMENTS  RUE WFL. LUE PROM of elbow/wrist WFL. Shoulder assessed 0-80* secondary to sublux  -ES           MMT (Manual Muscle Testing)    General MMT Comments  RUE 4-5/, LUE no trace movement  -ES           Motor Assessment/Interventions    Additional Documentation  Balance (Group)  -ES           Balance    Balance  static sitting balance;static standing balance;dynamic sitting balance;dynamic standing balance  -ES           Static Sitting Balance    Level of [place] (Unsupported Sitting, Static Balance)  supervision  -ES        Sitting Position (Unsupported Sitting, Static Balance)  sitting on edge of bed  -ES        Time Able to Maintain Position (Unsupported Sitting, Static Balance)  more than 5  minutes  -ES           Dynamic Sitting Balance    Level of [place], Reaches Outside Midline (Sitting, Dynamic Balance)  contact guard assist  -ES        Sitting Position, Reaches Outside Midline (Sitting, Dynamic Balance)  sitting on edge of bed  -ES           Static Standing Balance    Level of [place] (Supported Standing, Static Balance)  minimal assist, 75% patient effort  -ES        Time Able to Maintain Position (Supported Standing, Static Balance)  15 to 30 seconds;30 to 45 seconds  -ES        Assistive Device Utilized (Supported Standing, Static Balance)  cane, quad  -ES           Dynamic Standing Balance    Level of [place], Reaches Outside Midline (Standing, Dynamic Balance)  unable to perform activity  -ES           Neuromuscular Re-education    Comment, Neuromuscular Re-education  1 finger sublux at LUE  -ES           Sensory Assessment/Intervention    Sensory General Assessment  -- No sensation noted in LUE, diminished LLE  -ES           Positioning and Restraints    Pre-Treatment Position  in bed  -ES        Post Treatment Position  bed  -ES        In Bed  call light within reach;exit alarm on;encouraged to call for assist;notified nsg  -ES           Pain Scale: Numbers Pre/Post-Treatment    Pain Scale: Numbers, Pretreatment  0/10 - no pain  -ES        Pain Scale: Numbers, Post-Treatment  0/10 - no pain  -ES           Health Promotion    Additional Documentation  Coping (Group)  -ES           Coping    Observed Emotional State  attention-seeking behavior;frustrated  -ES        Verbalized Emotional State  frustration  -ES           Plan of Care Review    Plan of Care Reviewed With  patient  -ES        Outcome Summary  Pt is 69 yo male adm for pna, (+) covid 19. Hx of recent nstemi, fall, and rhabdomyolysis.  Pt with hx of CVA with Lsided hemiparesis. Pt had been at rehab and was home <2 days when admitted to [facility] for covid19. . Pt lives alone, but is normally able to dress himself and amb  short distances in home (~20 ft) to/from bathroom. Uses power w/c otherwise. Is independent in transfers at baseline. Pt reports having caregiver intermittently (3x/week) throughout week to assist with shower, shaving, and IADLs.  Pt re-eval at this time secondary to length of stay.  This date, pt in bed wihtout clothing on upon OT arrival. Pt very frustrated with hospital and derogatory toward therapist due to lack of  background. Pt states extreme frustration that he is not given priority status due to his  status, and states his discharge is limited due to limited resources for veterans. Pt eventually agreeable to work with OT when encouraged that increased activity may help facilitate discharge. Pt Mod A for bed mobility, with little/no attempt to manipulate LUE/LE during transfers. Completes 3 sit>stand transfers from elevated height with min A. Pt stands no longer than 30 seconds, and only 10 seconds last two attempts. Pt dependent for LB dressing this date and requires Mod A for UBdressing. Perseverative on increased independence at home due to having power wheelchair. OT encouraged in chair activity to increase activity tolerance, but pt declines. Tachycardic with EOB and standing movement. Pt anasognostic, impulsive, and unsafe to return home with minimal, intermittent assistance. OT continues to recommend IP rehab at discharge. PPE: gloves, N95, surgical mask, shoe covers, bonnet, eyewear, face shield, gown   -ES           Clinical Impression (OT)    Rehab Potential (OT Eval)  fair, will monitor progress closely  -ES        Therapy Frequency (OT Eval)  5 times/wk  -ES        Predicted Duration of Therapy Intervention (Therapy Eval)  until discharge  -ES        Anticipated Discharge Disposition (OT)  inpatient rehabilitation facility  -ES           Vital Signs    Pretreatment Heart Rate (beats/min)  83  -ES        Intratreatment Heart Rate (beats/min)  114  -ES        Posttreatment Heart Rate  (beats/min)  146  -ES        Pre SpO2 (%)  94  -ES        O2 Delivery Pre Treatment  room air  -ES        Intra SpO2 (%)  95  -ES        O2 Delivery Intra Treatment  room air  -ES        Post SpO2 (%)  99  -ES        O2 Delivery Post Treatment  room air  -ES        Pre Patient Position  Supine  -ES        Intra Patient Position  Sitting  -ES        Post Patient Position  Standing  -ES           Planned OT Interventions    Planned Therapy Interventions (OT Eval)  activity tolerance training;BADL retraining;cognitive/visual perception retraining;edema control/reduction;IADL retraining;neuromuscular control/coordination retraining;occupation/activity based interventions;orthotic fabrication/fitting/training;passive ROM/stretching;patient/caregiver education/training;ROM/therapeutic exercise;strengthening exercise;transfer/mobility retraining  -ES           OT Goals    Dressing Goal Selection (OT)  dressing, OT goal 1;dressing, OT goal 2  -ES        Toileting Goal Selection (OT)  toileting, OT goal 1  -ES           Dressing Goal 1 (OT)    Activity/Assistive Device (Dressing Goal 1, OT)  upper body dressing  -ES        [place]/Cues Needed (Dressing Goal 1, OT)  supervision required  -ES        Time Frame (Dressing Goal 1, OT)  2 weeks  -ES           Dressing Goal 2 (OT)    Activity/Assistive Device (Dressing Goal 2, OT)  lower body dressing  -ES        [place]/Cues Needed (Dressing Goal 2, OT)  minimum assist (75% or more patient effort)  -ES        Time Frame (Dressing Goal 2, OT)  2 weeks  -ES           Toileting Goal 1 (OT)    Activity/Device (Toileting Goal 1, OT)  toileting skills, all  -ES        [place] Level/Cues Needed (Toileting Goal 1, OT)  conditional independence  -ES        Time Frame (Toileting Goal 1, OT)  2 weeks  -ES           Living Environment    Home Accessibility  wheelchair accessible  -ES          User Key  (r) = Recorded By, (t) = Taken By, (c) = Cosigned By    Initials Name  Effective Dates    Irene Faustin OT 03/01/19 -          Occupational Therapy Education                 Title: PT OT SLP Therapies (Done)     Topic: Occupational Therapy (Done)     Point: ADL training (Done)     Description:   Instruct learner(s) on proper safety adaptation and remediation techniques during self care or transfers.   Instruct in proper use of assistive devices.              Learning Progress Summary           Patient Acceptance, E,TB, VU,NR by SHAQ at 9/8/2020 1619    Acceptance, TB, DU by KI at 9/6/2020 0911    Acceptance, E, VU by DARRICK at 9/6/2020 0454    Acceptance, TB, DU by KI at 9/5/2020 1217    Acceptance, TB, DU by KI at 9/5/2020 1215    Acceptance, E,TB,D, VU,DU,NR by  at 9/4/2020 0232    Acceptance, E,TB, VU,NR by  at 9/1/2020 1327    Acceptance, E, VU by  at 8/29/2020 1025    Acceptance, E, VU by KS at 8/26/2020 2332    Acceptance, E,TB, VU by  at 8/26/2020 0312    Acceptance, E,TB, VU by  at 8/25/2020 1702                   Point: Home exercise program (Done)     Description:   Instruct learner(s) on appropriate technique for monitoring, assisting and/or progressing therapeutic exercises/activities.              Learning Progress Summary           Patient Acceptance, TB, DU by KI at 9/6/2020 0911    Acceptance, E, VU by DARRICK at 9/6/2020 0454    Acceptance, TB, DU by KI at 9/5/2020 1217    Acceptance, TB, DU by KI at 9/5/2020 1215    Acceptance, E,TB,D, VU,DU,NR by  at 9/4/2020 0232    Acceptance, E, VU by JOSE E at 8/29/2020 1025    Acceptance, E,D,TB, VU,NR by  at 8/28/2020 1553    Acceptance, E, VU by KS at 8/26/2020 2332    Acceptance, E,TB, VU by  at 8/26/2020 0312                   Point: Precautions (Done)     Description:   Instruct learner(s) on prescribed precautions during self-care and functional transfers.              Learning Progress Summary           Patient Acceptance, E,TB, VU,NR by SHAQ at 9/8/2020 1619    Acceptance, TB, DU by TIGRE at 9/6/2020 0911     Acceptance, E, VU by DARRICK at 9/6/2020 0454    Acceptance, TB, DU by KI at 9/5/2020 1217    Acceptance, TB, DU by KI at 9/5/2020 1215    Acceptance, E,TB,D, VU,DU,NR by  at 9/4/2020 0232    Acceptance, E,TB, VU,NR by ES at 9/1/2020 1327    Acceptance, E, VU by  at 8/29/2020 1025    Acceptance, E, VU by KS at 8/26/2020 2332    Acceptance, E,TB, VU by  at 8/26/2020 0312    Acceptance, E,TB, VU by  at 8/25/2020 1702                   Point: Body mechanics (Done)     Description:   Instruct learner(s) on proper positioning and spine alignment during self-care, functional mobility activities and/or exercises.              Learning Progress Summary           Patient Acceptance, E,TB, VU,NR by SHAQ at 9/8/2020 1619    Acceptance, TB, DU by KI at 9/6/2020 0911    Acceptance, E, VU by DARRICK at 9/6/2020 0454    Acceptance, TB, DU by KI at 9/5/2020 1217    Acceptance, TB, DU by KI at 9/5/2020 1215    Acceptance, E,TB,D, VU,DU,NR by  at 9/4/2020 0232    Acceptance, E,TB, VU,NR by ES at 9/1/2020 1327    Acceptance, E, VU by  at 8/29/2020 1025    Acceptance, E,D,TB, VU,NR by  at 8/28/2020 1553    Acceptance, E, VU by KS at 8/26/2020 2332    Acceptance, E,TB, VU by  at 8/26/2020 0312    Acceptance, E,TB, VU by SHAQ at 8/25/2020 1702                               User Key     Initials Effective Dates Name Provider Type Discipline     03/01/19 -  Jessica Ansari OT Occupational Therapist OT    ES 03/01/19 -  Irene Lara OT Occupational Therapist OT    TIGRE 03/01/19 -  Tayler Camargo RN Registered Nurse Nurse     06/24/19 -  Juvecnio Burton LPN Licensed Nurse Nurse    JOSE E 06/24/19 -  Rita Jones LPN Licensed Nurse Nurse    DARRICK 06/24/19 -  Ranjana Ahuja LPN Licensed Nurse Nurse    SS 06/24/19 -  Manuela Dorsey LPN Licensed Nurse Nurse    KS 03/01/19 -  Rizwana Yuan RN Registered Nurse Nurse                  OT Recommendation and Plan  Outcome Summary/Treatment Plan (OT)  Anticipated Discharge  Disposition (OT): inpatient rehabilitation facility  Planned Therapy Interventions (OT Eval): activity tolerance training, BADL retraining, cognitive/visual perception retraining, edema control/reduction, IADL retraining, neuromuscular control/coordination retraining, occupation/activity based interventions, orthotic fabrication/fitting/training, passive ROM/stretching, patient/caregiver education/training, ROM/therapeutic exercise, strengthening exercise, transfer/mobility retraining  Therapy Frequency (OT Eval): 5 times/wk  Plan of Care Review  Plan of Care Reviewed With: patient  Plan of Care Reviewed With: patient  Outcome Summary: Pt is 71 yo male adm for pna, (+) covid 19. Hx of recent nstemi, fall, and rhabdomyolysis.  Pt with hx of CVA with Lsided hemiparesis. Pt had been at rehab and was home <2 days when admitted to [facility] for covid19. . Pt lives alone, but is normally able to dress himself and amb short distances in home (~20 ft) to/from bathroom. Uses power w/c otherwise. Is independent in transfers at baseline. Pt reports having caregiver intermittently (3x/week) throughout week to assist with shower, shaving, and IADLs.  Pt re-eval at this time secondary to length of stay.  This date, pt in bed wihtout clothing on upon OT arrival. Pt very frustrated with hospital and derogatory toward therapist due to lack of  background. Pt states extreme frustration that he is not given priority status due to his  status, and states his discharge is limited due to limited resources for veterans. Pt eventually agreeable to work with OT when encouraged that increased activity may help facilitate discharge. Pt Mod A for bed mobility, with little/no attempt to manipulate LUE/LE during transfers. Completes 3 sit>stand transfers from elevated height with min A. Pt stands no longer than 30 seconds, and only 10 seconds last two attempts. Pt dependent for LB dressing this date and requires Mod A for UBdressing.  Perseverative on increased independence at home due to having power wheelchair. OT encouraged in chair activity to increase activity tolerance, but pt declines. Tachycardic with EOB and standing movement. Pt anasognostic, impulsive, and unsafe to return home with minimal, intermittent assistance. OT continues to recommend IP rehab at discharge. PPE: gloves, N95, surgical mask, shoe covers, bonnet, eyewear, face shield, gown         Time Calculation:   Time Calculation- OT     Row Name 09/08/20 1620             Time Calculation- OT    OT Start Time  1500  -ES      OT Stop Time  1543  -ES      OT Time Calculation (min)  43 min  -ES      Total Timed Code Minutes- OT  33 minute(s)  -ES      OT Non-Billable Time (min)  10 min  -ES      OT Received On  09/08/20  -ES      OT - Next Appointment  09/09/20  -ES      OT Goal Re-Cert Due Date  09/22/20  -ES        User Key  (r) = Recorded By, (t) = Taken By, (c) = Cosigned By    Initials Name Provider Type    ES Irene Lara OT Occupational Therapist        Therapy Charges for Today     Code Description Service Date Service Provider Modifiers Qty    [id number]  OT SELF CARE/MGMT/TRAIN EA 15 MIN 9/8/2020 Irene Lara OT GO 1    [id number]  OT THERAPEUTIC ACT EA 15 MIN 9/8/2020 Irene Lara OT GO 1    [id number]  OT RE-EVAL 2 9/8/2020 Irene Lara OT GO 1               Irene Lara OT  9/8/2020

## 2020-09-08 NOTE — PLAN OF CARE
Problem: Patient Care Overview  Goal: Plan of Care Review  Outcome: Ongoing (interventions implemented as appropriate)  Flowsheets (Taken 9/8/2020 1801)  Outcome Summary: Pt is a 71 YO M admitted for PNA due to COVID 19 with significant history of CVA with L sided paresis. 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 Pt does report ambulating 10 feet with quad cane into restroom, and transfers self to chair. Pt demonstrates, significantly decreased activity tolerance, and did not stand this date with PT. Pt is unable to perform necessary ambulation in order to function at baseline and will require IP rehab following d/c. PPE worn includes N95, surgical mask, gloves, gown, face shield, hair bonnet and shoe covers.

## 2020-09-08 NOTE — THERAPY RE-EVALUATION
Patient Name: Ivan Maradiaga  : 1950    MRN: [id number]                              Today's Date: 2020       Admit Date: 2020    Visit Dx:     ICD-10-CM ICD-9-CM   1. Delirium R41.0 780.09   2. Pneumonia due to COVID-19 virus U07.1 480.8    J12.89    3. Weakness R53.1 780.79     Patient Active Problem List   Diagnosis   • Delirium   • Weakness   • Pneumonia due to COVID-19 virus     Past Medical History:   Diagnosis Date   • CAD (coronary artery disease)    • CHF (congestive heart failure) (CMS/[facility])    • Dementia (CMS/[facility])    • Hypertension      History reviewed. No pertinent surgical history.  General Information     Row Name 20 175          PT Evaluation Time/Intention    Document Type  evaluation  -     Mode of Treatment  physical therapy  -     Row Name 20 175          General Information    Patient Profile Reviewed?  yes  -EL     Prior Level of Function  independent:;transfer;w/c or scooter  -EL     Existing Precautions/Restrictions  fall  -EL     Barriers to Rehab  medically complex  -     Row Name 20 175          Relationship/Environment    Lives With  alone  -[facility] Name 20 175          Resource/Environmental Concerns    Current Living Arrangements  home/apartment/condo  -[facility] Name 20 175          Home Main Entrance    Number of Stairs, Main Entrance  none  -[facility] Name 20 1752          Stairs Within Home, Primary    Number of Stairs, Within Home, Primary  none  -[facility] Name 20 175          Cognitive Assessment/Intervention- PT/OT    Orientation Status (Cognition)  oriented x 4  -     Row Name 20 175          Safety Issues, Functional Mobility    Safety Issues Affecting Function (Mobility)  insight into deficits/self awareness;awareness of need for assistance;problem solving;judgment;safety precautions follow-through/compliance  -EL     Impairments Affecting Function (Mobility)  endurance/activity  tolerance;balance;cognition;strength;postural/trunk control;range of motion (ROM)  -     Comment, Safety Issues/Impairments (Mobility)  L sides 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 Pt also requires assistance for positioning in bed.   -     Row Name 09/08/20 1754          Transfer Assessment/Treatment    Comment (Transfers)  Pt reports he just finished with OT and did not want to come to standing today  -[facility] Name 09/08/20 1754          Gait/Stairs Assessment/Training    Advance Level (Gait)  unable to assess  -       User Key  (r) = Recorded By, (t) = Taken By, (c) = Cosigned By    Initials Name Provider Type    Bradley Alaniz, NOAH Physical Therapist        Obj/Interventions     Row Name 09/08/20 1757          General ROM    GENERAL ROM COMMENTS  RLE with slight knee flexion contracture approx 10 degrees. LLE AROM limited by previous CVA  -[facility] Name 09/08/20 1757          MMT (Manual Muscle Testing)    General MMT Comments  RLE functionally 3/5. LLE 2/5 through available range except dorsiflexion 1/5.  -     Row Name 09/08/20 1757          Therapeutic Exercise    Lower Extremity (Therapeutic Exercise)  -- Pt instructed in seated ther ex EOB, seated marches, LAQ, ankle pumps. Seated ther ex performed bilaterally with assistance given to RLE. Pt also instructed with seated posture/breathing exercises.  -[facility] Name 09/08/20 1757          Static  Sitting Balance    Level of Randolph (Unsupported Sitting, Static Balance)  supervision  -EL     Sitting Position (Unsupported Sitting, Static Balance)  sitting on edge of bed  -EL     Time Able to Maintain Position (Unsupported Sitting, Static Balance)  more than 5 minutes  -     Row Name 09/08/20 1757          Dynamic Sitting Balance    Level of [place], Reaches Outside Midline (Sitting, Dynamic Balance)  contact guard assist  -EL     Sitting Position, Reaches Outside Midline (Sitting, Dynamic Balance)  sitting on edge of bed  -EL       User Key  (r) = Recorded By, (t) = Taken By, (c) = Cosigned By    Initials Name Provider Type    Bradley Alaniz, PT Physical Therapist        Goals/Plan     Row Name 09/08/20 1810          Bed Mobility Goal 1 (PT)    Activity/Assistive Device (Bed Mobility Goal 1, PT)  bed mobility activities, all  -EL     Randolph Level/Cues Needed (Bed Mobility Goal 1, PT)  conditional independence  -EL     Time Frame (Bed Mobility Goal 1, PT)  2 weeks  -     Row Name 09/08/20 1810          Transfer Goal 1 (PT)    Activity/Assistive Device (Transfer Goal 1, PT)  transfers, all;cane, quad  -EL     Randolph Level/Cues Needed (Transfer Goal 1, PT)  minimum assist (75% or more patient effort)  -EL     Time Frame (Transfer Goal 1, PT)  2 weeks  -     Row Name 09/08/20 1810          Gait Training Goal 1 (PT)    Activity/Assistive Device (Gait Training Goal 1, PT)  gait (walking locomotion);assistive device use;cane, quad  -EL     Randolph Level (Gait Training Goal 1, PT)  moderate assist (50-74% patient effort)  -EL       User Key  (r) = Recorded By, (t) = Taken By, (c) = Cosigned By    Initials Name Provider Type    Bradley Alaniz, PT Physical Therapist        Clinical Impression     Row Name 09/08/20 1801          Pain Scale: Numbers Pre/Post-Treatment    Pain Scale: Numbers, Pretreatment  0/10 - no pain  -EL     Pain Scale: Numbers, Post-Treatment  0/10 - no pain  -EL      Row Name 09/08/20 1801          Plan of Care Review    Plan of Care Reviewed With  patient  -EL     Outcome Summary  Pt is a 69 YO M admitted for PNA due to COVID 19 with significant history of CVA with L sided paresis. 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 Pt does report ambulating 10 feet with quad cane into restroom, and transfers self to chair. Pt demonstrates, significantly decreased activity tolerance, and did not stand this date with PT. Pt is unable to perform necessary ambulation in order to function at baseline and will require IP rehab following d/c. PPE worn includes N95, surgical mask, gloves, gown, face shield, hair bonnet and shoe covers.  -EL     Row Name 09/08/20 1801          Physical Therapy Clinical Impression    Criteria for Skilled Interventions Met (PT Clinical Impression)  yes;treatment indicated  -EL     Rehab Potential (PT Clinical Summary)  fair, will monitor progress closely  -EL     Predicted Duration of Therapy (PT)  Until d/c  -EL     Row Name 09/08/20 1801          Vital Signs    Pretreatment Heart Rate (beats/min)  113  -EL     Intratreatment Heart Rate (beats/min)  126  -EL     Posttreatment Heart Rate (beats/min)  110  -EL     O2 Delivery Pre Treatment  room air  -EL     O2 Delivery Intra Treatment  room air  -EL     O2 Delivery Post Treatment  room air  -EL     Pre Patient Position  Supine  -EL     Intra Patient Position  Sitting  -EL     Post Patient Position  Supine  -EL     Row Name 09/08/20 1801          Positioning and Restraints    Pre-Treatment Position  in bed  -EL     Post Treatment Position  bed  -EL     In Bed  notified nsg;supine;call light within reach;encouraged to call for assist;exit alarm on  -EL       User Key  (r) = Recorded By, (t) = Taken By, (c) = Cosigned By    Initials Name Provider Type    Bradley Alaniz PT Physical Therapist        Outcome Measures     Row Name 09/08/20 1810          How much help from another person  do you currently need...    Turning from your back to your side while in flat bed without using bedrails?  3  -EL     Moving from lying on back to sitting on the side of a flat bed without bedrails?  2  -EL     Moving to and from a bed to a chair (including a wheelchair)?  1  -EL     Standing up from a chair using your arms (e.g., wheelchair, bedside chair)?  3  -EL     Climbing 3-5 steps with a railing?  1  -EL     To walk in hospital room?  1  -EL     AM-PAC 6 Clicks Score (PT)  11  -EL     Row Name 09/08/20 1810          Modified [place] Scale    Pre-Stroke Modified Ronny Scale  2 - Slight disability.  Unable to carry out all previous activities but able to look after own affairs without assistance.  -EL     Modified [place] Scale  5 - Severe disability.  Bedridden, incontinent, and requiring constant nursing care and attention.  -EL       User Key  (r) = Recorded By, (t) = Taken By, (c) = Cosigned By    Initials Name Provider Type    Bradley Alaniz, NOAH Physical Therapist        Physical Therapy Education                 Title: PT OT SLP Therapies (Done)     Topic: Physical Therapy (Done)     Point: Mobility training (Done)     Description:   Instruct learner(s) on safety and technique for assisting patient out of bed, chair or wheelchair.  Instruct in the proper use of assistive devices, such as walker, crutches, cane or brace.              Patient Friendly Description:   It's important to get you on your feet again, but we need to do so in a way that is safe for you. Falling has serious consequences, and your personal safety is the most important thing of all.        When it's time to get out of bed, one of us or a family member will sit next to you on the bed to give you support.     If your doctor or nurse tells you to use a walker, crutches, a cane, or a brace, be sure you use it every time you get out of bed, even if you think you don't need it.    Learning Progress Summary           Patient Acceptance, E,TB,  VU by EL at 9/8/2020 1811    Acceptance, TB, DU by KI at 9/6/2020 0911    Acceptance, E, VU by DD at 9/6/2020 0454    Acceptance, TB, DU by KI at 9/5/2020 1217    Acceptance, TB, DU by KI at 9/5/2020 1215    Acceptance, E,TB,D, VU,DU,NR by  at 9/4/2020 0232    Acceptance, E, NR by HC at 9/2/2020 1618    Acceptance, E, NR by HC at 8/31/2020 1602    Acceptance, E, VU by SW at 8/29/2020 1025    Acceptance, E, NR by HC at 8/28/2020 1614    Acceptance, E,TB, VU by EL at 8/27/2020 1551    Acceptance, E, VU by KS at 8/26/2020 2332    Acceptance, E,TB, VU by SS at 8/26/2020 0312    Acceptance, E,TB, VU by EL at 8/25/2020 1536    Acceptance, E,TB, VU,NR by  at 8/24/2020 1138                               User Key     Initials Effective Dates Name Provider Type Discipline     04/17/20 -  Corinne He, PT Physical Therapist PT     03/01/19 -  Mirna Bear, PT Physical Therapist PT    EL 06/23/20 -  Bradley Daniel, PT Physical Therapist PT    KI 03/01/19 -  Tayler Camargo RN Registered Nurse Nurse     06/24/19 -  Juvencio Burton LPN Licensed Nurse Nurse    SW 06/24/19 -  Rita Jones LPN Licensed Nurse Nurse    DARRICK 06/24/19 -  Ranjana Ahuja LPN Licensed Nurse Nurse     06/24/19 -  Manuela Dorsey LPN Licensed Nurse Nurse    KS 03/01/19 -  Rizwana Yuan RN Registered Nurse Nurse              PT Recommendation and Plan  Planned Therapy Interventions (PT Eval): balance training, neuromuscular re-education, patient/family education, transfer training, gait training, strengthening  Outcome Summary/Treatment Plan (PT)  Anticipated Discharge Disposition (PT): inpatient rehabilitation facility  Plan of Care Reviewed With: patient  Outcome Summary: Pt is a 69 YO M admitted for PNA due to COVID 19 with significant history of CVA with L sided paresis. Pt typically lives home alone, where he mostly operates from electric w/[employer]. Pt does report ambulating 10 feet with quad cane into restroom, and  transfers self to chair. Pt demonstrates, significantly decreased activity tolerance, and did not stand this date with PT. Pt is unable to perform necessary ambulation in order to function at baseline and will require IP rehab following d/c. PPE worn includes N95, surgical mask, gloves, gown, face shield, hair bonnet and shoe covers.     Time Calculation:   PT Charges     Row Name 09/08/20 1812             Time Calculation    Start Time  1605  -EL      Stop Time  1630  -EL      Time Calculation (min)  25 min  -EL      PT Received On  09/08/20  -EL      PT - Next Appointment  09/10/20  -EL      PT Goal Re-Cert Due Date  09/22/20  -EL         Time Calculation- PT    Total Timed Code Minutes- PT  10 minute(s)  -EL        User Key  (r) = Recorded By, (t) = Taken By, (c) = Cosigned By    Initials Name Provider Type    Bradley Alaniz PT Physical Therapist        Therapy Charges for Today     Code Description Service Date Service Provider Modifiers Qty    [id number]  PT RE-EVAL ESTABLISHED PLAN 2 9/8/2020 Bradley Daniel, PT GP 1    [id number]  PT THER PROC EA 15 MIN 9/8/2020 Bradley Daniel, PT GP 1          PT G-Codes  Outcome Measure Options: AM-PAC 6 Clicks Basic Mobility (PT), Modified [place]  AM-PAC 6 Clicks Score (PT): 11  Modified [place] Scale: 5 - Severe disability.  Bedridden, incontinent, and requiring constant nursing care and attention.    Bradley Daniel PT  9/8/2020

## 2020-09-08 NOTE — PLAN OF CARE
Patient is alert and oriented.  Patient uses a urinal.  Patient denies pain.  Patient is extremely high call light volume.  Patient was educated about this.   Waiting on placement.  Will continue to monitor

## 2020-09-09 PROCEDURE — 99232 SBSQ HOSP IP/OBS MODERATE 35: CPT | Performed by: INTERNAL MEDICINE

## 2020-09-09 PROCEDURE — 25010000002 ENOXAPARIN PER 10 MG: Performed by: NURSE PRACTITIONER

## 2020-09-09 RX ADMIN — HYDRALAZINE HYDROCHLORIDE 50 MG: 25 TABLET, FILM COATED ORAL at 20:19

## 2020-09-09 RX ADMIN — Medication 5 MG: at 20:19

## 2020-09-09 RX ADMIN — LEVETIRACETAM 500 MG: 500 TABLET ORAL at 20:19

## 2020-09-09 RX ADMIN — CLOPIDOGREL BISULFATE 75 MG: 75 TABLET ORAL at 08:43

## 2020-09-09 RX ADMIN — TRAZODONE HYDROCHLORIDE 50 MG: 50 TABLET ORAL at 20:19

## 2020-09-09 RX ADMIN — Medication 50 MG: at 08:44

## 2020-09-09 RX ADMIN — Medication 10 ML: at 20:21

## 2020-09-09 RX ADMIN — LEVETIRACETAM 500 MG: 500 TABLET ORAL at 08:44

## 2020-09-09 RX ADMIN — LOSARTAN POTASSIUM 50 MG: 50 TABLET, FILM COATED ORAL at 08:44

## 2020-09-09 RX ADMIN — ENOXAPARIN SODIUM 40 MG: 40 INJECTION SUBCUTANEOUS at 18:04

## 2020-09-09 RX ADMIN — Medication 10 ML: at 08:44

## 2020-09-09 RX ADMIN — CALCIUM CARBONATE (ANTACID) CHEW TAB 500 MG 2 TABLET: 500 CHEW TAB at 18:04

## 2020-09-09 RX ADMIN — FINASTERIDE 5 MG: 5 TABLET, FILM COATED ORAL at 08:44

## 2020-09-09 RX ADMIN — TAMSULOSIN HYDROCHLORIDE 0.8 MG: 0.4 CAPSULE ORAL at 08:44

## 2020-09-09 RX ADMIN — AMLODIPINE BESYLATE 10 MG: 5 TABLET ORAL at 08:44

## 2020-09-09 RX ADMIN — CETIRIZINE HYDROCHLORIDE 5 MG: 10 TABLET, FILM COATED ORAL at 08:43

## 2020-09-09 RX ADMIN — HYDRALAZINE HYDROCHLORIDE 50 MG: 25 TABLET, FILM COATED ORAL at 08:44

## 2020-09-09 RX ADMIN — ROSUVASTATIN CALCIUM 20 MG: 10 TABLET, FILM COATED ORAL at 20:19

## 2020-09-09 RX ADMIN — OXYCODONE HYDROCHLORIDE AND ACETAMINOPHEN 500 MG: 500 TABLET ORAL at 08:44

## 2020-09-09 NOTE — PLAN OF CARE
Problem: Patient Care Overview  Goal: Plan of Care Review  Outcome: Ongoing (interventions implemented as appropriate)  Flowsheets  Taken 9/9/2020 1427  Progress: no change  Outcome Summary: pt is uncooperative. cluster care education given on multiple occasions. a&o x4. no complaints. educated on turning in bed and frequent weight shifting. pt refuses to weight shift. d/c awaiting placement. d/c denied by daughter. will continue to monitor.  Taken 9/9/2020 0701  Plan of Care Reviewed With: patient  Goal: Individualization and Mutuality  Outcome: Ongoing (interventions implemented as appropriate)  Goal: Discharge Needs Assessment  Outcome: Ongoing (interventions implemented as appropriate)  Goal: Interprofessional Rounds/Family Conf  Outcome: Ongoing (interventions implemented as appropriate)     Problem: Fall Risk (Adult)  Goal: Identify Related Risk Factors and Signs and Symptoms  Outcome: Ongoing (interventions implemented as appropriate)     Problem: Skin Injury Risk (Adult)  Goal: Identify Related Risk Factors and Signs and Symptoms  Outcome: Ongoing (interventions implemented as appropriate)  Goal: Skin Health and Integrity  Outcome: Ongoing (interventions implemented as appropriate)

## 2020-09-09 NOTE — PROGRESS NOTES
[facility] Daily Progress Note      Hospitalist Team  LOS 16 days      Patient Care Team:  Kelly Gill DO as PCP - General (Physical Medicine and Rehabilitation)    Patient Location: 203/1      Subjective   Subjective     Chief Complaint / Subjective  Chief Complaint   Patient presents with   • Weakness - Generalized         Brief Synopsis of Hospital Course/HPI  70-year-old man with history of CAD, dementia, CVA with left-sided residual weakness ,? CHF and hypertension.  According to admission note and talking to his daughter--> he was recently treated at [facility] for STEMI and rhabdomyolysis in the setting of acute kidney injury after a mechanical fall.  He was subsequently discharged to rehab facility for continued physical therapy. Apparently, he was discharged from rehab on 8/22/2020.  He presented to [facility] ED with  AMS/encephalopathy.  He endorses  mild chest discomfort  shortness of breath with scant  productive cough.  No reported fever, chills, no night sweats, and no abdominal pain nausea or vomiting.  On arrival to [facility], he was screened and tested positive for COVID-19 and subsequently placed on isolation.  Chest x-ray revealed patchy peripheral airspace opacity concerning for bilateral multifocal pneumonia.      Review of record here at [facility] showed multiple testing here on 8/14 and 8/15 which were negative.  However, he had another testing done on 8/23/2020 which was positive.   Otherwise, he remained clinically stable and oxygenating well on room air. Laboratory showed static, nonzero slight evaded troponin and EKG showed sinus rhythm with RBBB.        He was evaluated by cardiologist and ID.  Patient does not require Remdisivir with stable oxygenation on RA.  He was  maintained on isolation and treated with IV antibiotic with Zosyn, and later switched to  Augmentin for COVID related pneumonia possible secondary  bacterial infection.  The patient was felt to be stable for discharge however his daughter appealed to Medicare per nursing staff reporting that the patient's daughter wanted the patient's COVID test to be negative prior to discharge.     8/31/2020: The patient denies current complaints.  He is anxious to go home and is asking when his COVID test will be repeated.    9/1/2020: Patient denies current complaints except for feeling cold and asking for his blankets.  I spoke with the patient's daughter Maureen.  She is requesting repeat COVID testing so that if the patient is negative he will have more options for inpatient rehab since only one facility locally is taking COVID positive patients.  The COVID test was ordered resulted as positive.  Patient's daughter was notified.  9/2/2020: Patient denies c/o except that his bed is not comfortable.  He is anxious to go home.  He was counseled on the difficulties of sending him home when it is not safe for him to be there alone.   9/3/2020: Patient reports feeling cold but otherwise denies current complaints.  9/4/2020: The patient denies current complaints.  9/5/2020: Patient reports no current complaints.  He reports that he is too weak to walk.  9/6/2020: Patient denies current complaints except for some mild postnasal drainage, generalized weakness, and the bed is uncomfortable.  COVID test was performed again on 9/6/2020 because if it is negative he should be able to discharge to a noncovered facility.  If it is positive, he has to stay here because his daughter refuses to let him go to a COVID positive facility and he is not safe to discharge to home.  His COVID test was positive again today.    9/7/20 no new complaints, denies SOA, cough or chest pain, awaiting placement, family appealed discharge  9/8/20-no new complaints, no events overnight VSS, DW with family.    9/9/20-no new complaints, PT recommending rehab. Awaiting placement, [facility] will not accept covid  "positive patients.     Review of Systems   HENT: Negative.    Eyes: Positive for blurred vision.   Cardiovascular: Negative.    Respiratory: Negative.    Endocrine: Negative.    Hematologic/Lymphatic: Negative.    Skin: Negative.    Musculoskeletal: Negative.    Gastrointestinal: Negative.    Genitourinary: Negative.    Neurological: Positive for weakness.   Psychiatric/Behavioral: Negative.    Allergic/Immunologic: Negative.    All other systems reviewed and are negative.    12 point review of systems was reviewed and was negative.    Objective   Objective      Vital Signs  Temp:  [96.8 °F (36 °C)-97.5 °F (36.4 °C)] 97.2 °F (36.2 °C)  Heart Rate:  [] 84  Resp:  [17-18] 17  BP: (124-159)/() 125/69  Oxygen Therapy  SpO2: 95 %  Pulse Oximetry Type: Intermittent  Device (Oxygen Therapy): room air  Device (Oxygen Therapy): room air  Flow (L/min): 2  Flowsheet Rows      First Filed Value   Admission Height  182.9 cm (72\") Documented at 08/23/2020 0314   Admission Weight  78 kg (171 lb 15.3 oz) Documented at 08/23/2020 0314        Intake & Output (last 3 days)       09/06 0701 - 09/07 0700 09/07 0701 - 09/08 0700 09/08 0701 - 09/09 0700 09/09 0701 - 09/10 0700    P.O. 780 720 400     I.V. (mL/kg)        Total Intake(mL/kg) 780 (8.8) 720 (8.2) 400 (4.5)     Urine (mL/kg/hr) 630 (0.3) 575 (0.3) 325 (0.2)     Stool  0      Total Output 630 575 325     Net +150 +145 +75             Stool Unmeasured Occurrence  3 x          Lines, Drains & Airways    Active LDAs     Name:   Placement date:   Placement time:   Site:   Days:    Peripheral IV 08/29/20 Posterior;Right Forearm   08/29/20    --    Forearm   5              Physical Exam   well-developed well-nourished gentleman lying in bed comfortable on room air awake and alert in no acute distress; mucous membranes moist; lungs clear to auscultation bilaterally; CV regular rate and rhythm; abdomen soft and nontender; extremities with no edema or calf " tenderness  Procedures:    Results Review:     I reviewed the patient's new clinical results.    Lab Results (last 24 hours)     ** No results found for the last 24 hours. **        No results found for: HGBA1C            No results found for: LIPASE  No results found for: CHOL, CHLPL, TRIG, HDL, LDL, LDLDIRECT    No results found for: INTRAOP, PREDX, FINALDX, COMDX    Microbiology Results (last 10 days)     Procedure Component Value - Date/Time    Respiratory Panel PCR w/COVID-19(SARS-CoV-2) MAIRA/NANDINI/ANA/PAD/COR/MAD In-House, NP Swab in UTM/VTM, 3-4 HR TAT - Swab, Nasopharynx [[id number]]  (Abnormal) Collected:  09/06/20 1632    Lab Status:  Final result Specimen:  Swab from Nasopharynx Updated:  09/06/20 1832     ADENOVIRUS, PCR Not Detected     Coronavirus 229E Not Detected     Coronavirus HKU1 Not Detected     Coronavirus NL63 Not Detected     Coronavirus OC43 Not Detected     COVID19 Detected     Human Metapneumovirus Not Detected     Human Rhinovirus/Enterovirus Not Detected     Influenza A PCR Not Detected     Influenza A H1 Not Detected     Influenza A H1 2009 PCR Not Detected     Influenza A H3 Not Detected     Influenza B PCR Not Detected     Parainfluenza Virus 1 Not Detected     Parainfluenza Virus 2 Not Detected     Parainfluenza Virus 3 Not Detected     Parainfluenza Virus 4 Not Detected     RSV, PCR Not Detected     Bordetella pertussis pcr Not Detected     Bordetella parapertussis PCR Not Detected     Chlamydophila pneumoniae PCR Not Detected     Mycoplasma pneumo by PCR Not Detected    Narrative:       Fact sheet for providers: https://docs.[employer]/wp-content/uploads/KFL2867-4670-LX0.1-EUA-Provider-Fact-Sheet-3.pdf    Fact sheet for patients: https://docs.[employer]/wp-content/uploads/BJX2402-1458-BD9.1-EUA-Patient-Fact-Sheet-1.pdf    Respiratory Panel PCR w/COVID-19(SARS-CoV-2) MAIRA/NANDINI/ANA/PAD/COR/MAD In-House, NP Swab in UTM/VTM, 3-4 HR TAT - Swab, Nasopharynx [[id number]]  (Abnormal)  Collected:  09/01/20 1424    Lab Status:  Final result Specimen:  Swab from Nasopharynx Updated:  09/01/20 1558     ADENOVIRUS, PCR Not Detected     Coronavirus 229E Not Detected     Coronavirus HKU1 Not Detected     Coronavirus NL63 Not Detected     Coronavirus OC43 Not Detected     COVID19 Detected     Human Metapneumovirus Not Detected     Human Rhinovirus/Enterovirus Not Detected     Influenza A PCR Not Detected     Influenza A H1 Not Detected     Influenza A H1 2009 PCR Not Detected     Influenza A H3 Not Detected     Influenza B PCR Not Detected     Parainfluenza Virus 1 Not Detected     Parainfluenza Virus 2 Not Detected     Parainfluenza Virus 3 Not Detected     Parainfluenza Virus 4 Not Detected     RSV, PCR Not Detected     Bordetella pertussis pcr Not Detected     Bordetella parapertussis PCR Not Detected     Chlamydophila pneumoniae PCR Not Detected     Mycoplasma pneumo by PCR Not Detected    Narrative:       Fact sheet for providers: https://[employer]/wp-content/uploads/XQX5871-4523-ZY1.1-EUA-Provider-Fact-Sheet-3.pdf    Fact sheet for patients: https://docs.He[employer]/wp-content/uploads/NWJ9414-8975-FE9.1-EUA-Patient-Fact-Sheet-1.pdf          ECG/EMG Results (most recent)     Procedure Component Value Units Date/Time    ECG 12 Lead [[id number]] Collected:  08/23/20 0619     Updated:  08/23/20 0621    Narrative:       HEART RATE= 91  bpm  RR Interval= 660  ms  AZ Interval= 163  ms  P Horizontal Axis= 24  deg  P Front Axis= 64  deg  QRSD Interval= 152  ms  QT Interval= 382  ms  QRS Axis= -83  deg  T Wave Axis= -32  deg  - ABNORMAL ECG -  Sinus rhythm  RBBB and LAFB  Probable lateral infarct, age indeterminate  Electronically Signed By:   Date and Time of Study: 2020-08-23 06:19:41    ECG 12 Lead [[id number]] Collected:  08/26/20 1325     Updated:  08/31/20 2032    Narrative:       HEART RATE= 98  bpm  RR Interval= 612  ms  AZ Interval= 163  ms  P Horizontal Axis= 40  deg  P Front Axis= 60   deg  QRSD Interval= 154  ms  QT Interval= 396  ms  QRS Axis= 258  deg  T Wave Axis= -53  deg  - ABNORMAL ECG -  Sinus rhythm  Ventricular premature complex  Inferior infarct, old  When compared with ECG of 23-Aug-2020 6:19:41,  No significant change  Electronically Signed By: Jorge Chacon (ANA) 31-Aug-2020 20:08:03  Date and Time of Study: 2020-08-26 13:25:06              No radiology results for the last 7 days    Xrays, labs reviewed personally by physician.  Medication Review:   I have reviewed the patient's current medication list    Scheduled Meds    amLODIPine 10 mg Oral Daily   cetirizine 5 mg Oral Daily   clopidogrel 75 mg Oral Daily   enoxaparin 40 mg Subcutaneous Q24H   finasteride 5 mg Oral Daily   hydrALAZINE 50 mg Oral BID   levETIRAcetam 500 mg Oral BID   losartan 50 mg Oral Q24H   rosuvastatin 20 mg Oral Daily   sodium chloride 10 mL Intravenous Q12H   tamsulosin 0.8 mg Oral Daily   traZODone 50 mg Oral Nightly   vitamin C 500 mg Oral Daily   zinc gluconate 50 mg Oral Daily     Meds Infusions     Meds PRN  •  acetaminophen **OR** acetaminophen **OR** acetaminophen  •  benzonatate  •  calcium carbonate  •  melatonin  •  ondansetron **OR** ondansetron  •  potassium chloride  •  potassium chloride  •  sodium chloride    Assessment/Plan   Assessment/Plan     Active Hospital Problems    Diagnosis  POA   • **Pneumonia due to COVID-19 virus [U07.1, J12.89]  Unknown   • Delirium [R41.0]  Yes   • Weakness [R53.1]  Yes      Resolved Hospital Problems   No resolved problems to display.     MEDICAL DECISION MAKING COMPLEXITY BY PROBLEM:     Assessment and plan:  Acute COVID-19 pneumonia   -Patient retested and was still positive for COVID-19 on 9/1/2020    Possible secondary bacterial pneumonia, resolved        -oxygenating well on room air          -completed IV Zosyn followed by Augmentin     Coronary disease, recent non-ST elevation MI         -on Amlodipine/Plavix/hydralazine/Crestor         -Cardiology  saw the patient in consultation and the patient is felt to be stable             Essential hypertension, chronic and controlled         -Continue amlodipine /hydralazine      BPH       -Continue Proscar/Flomax     History of CVA with residual left-sided hemiparesis        -Continue Plavix/Crestor, and bedside range of motion as tolerated     Disposition:  He was discharged 8/28/2020, however, daughter contested discharge which was upheld and then she appealed the denial. Brenna reviewed the appeal and on reconsideration it was determined that the patient services should continue without liability until further notice.  I spoke with Melisa Jones utilization review coordinator with  at [facility] and she is going to be contacting Brenna for further clarity.    9/8/2020-JF RN and daughter, not wanting patient  to go back home, awaiting  input regarding discharge        VTE Prophylaxis -   Mechanical Order History:     None      Pharmalogical Order History:     Ordered     Dose Route Frequency Stop    08/23/20 0559  enoxaparin (LOVENOX) syringe 40 mg      40 mg SC Every 24 Hours --        Code Status -   Code Status and Medical Interventions:   Ordered at: 08/23/20 0559     Code Status:    CPR     Medical Interventions (Level of Support Prior to Arrest):    Full     This patient has been examined wearing appropriate Personal Protective Equipment  09/09/20    Discharge Planning  Pending rehab placement    Electronically signed by Tone Dia MD, 09/09/20, 08:33.  [facility] Hospitalist Team

## 2020-09-09 NOTE — PLAN OF CARE
Patient is alert and oriented.  Patient uses the urinal.  Patient denies pain and is on room air.  Patient slept most of night.  Patient was much better with the call light.  Patient needs reminded to help himself and needs encouragement that he can do a lot of things.  Patient also needs to be reminded that we do cluster care on the covid units.  Will continue to monitor

## 2020-09-09 NOTE — PROGRESS NOTES
Continued Stay Note  [facility]     Patient Name: Ivan Maradiaga  MRN: [id number]  Today's Date: 9/9/2020    Admit Date: 8/23/2020    Discharge Plan     Row Name 09/09/20 1506       Plan    Plan  DC Plan: New referral to [place] for inpt rehab, COVID +, per daughter request, Maureen Donald. [facility] and [facility] offered for placement. Both facilities will take COVID + pts. Daughter declines both facilities.               Expected Discharge Date and Time     Expected Discharge Date Expected Discharge Time    Aug 28, 2020             Karuna Garza RN

## 2020-09-09 NOTE — NURSING NOTE
Pt can turn self. Turning encouraged. Pt is refusing,but allowed us to pull up in bed. Bottom is beginning to turn red. mepilex applied for preventative measures. Pt educated. Will continue to monitor.

## 2020-09-10 PROCEDURE — 99232 SBSQ HOSP IP/OBS MODERATE 35: CPT | Performed by: INTERNAL MEDICINE

## 2020-09-10 PROCEDURE — 97530 THERAPEUTIC ACTIVITIES: CPT

## 2020-09-10 PROCEDURE — 25010000002 ENOXAPARIN PER 10 MG: Performed by: NURSE PRACTITIONER

## 2020-09-10 PROCEDURE — 97112 NEUROMUSCULAR REEDUCATION: CPT

## 2020-09-10 RX ADMIN — Medication 5 MG: at 20:49

## 2020-09-10 RX ADMIN — TRAZODONE HYDROCHLORIDE 50 MG: 50 TABLET ORAL at 20:49

## 2020-09-10 RX ADMIN — LEVETIRACETAM 500 MG: 500 TABLET ORAL at 09:05

## 2020-09-10 RX ADMIN — Medication 10 ML: at 09:05

## 2020-09-10 RX ADMIN — FINASTERIDE 5 MG: 5 TABLET, FILM COATED ORAL at 09:05

## 2020-09-10 RX ADMIN — HYDRALAZINE HYDROCHLORIDE 50 MG: 25 TABLET, FILM COATED ORAL at 09:05

## 2020-09-10 RX ADMIN — ENOXAPARIN SODIUM 40 MG: 40 INJECTION SUBCUTANEOUS at 17:20

## 2020-09-10 RX ADMIN — Medication 50 MG: at 09:05

## 2020-09-10 RX ADMIN — LOSARTAN POTASSIUM 50 MG: 50 TABLET, FILM COATED ORAL at 09:05

## 2020-09-10 RX ADMIN — CALCIUM CARBONATE (ANTACID) CHEW TAB 500 MG 2 TABLET: 500 CHEW TAB at 17:34

## 2020-09-10 RX ADMIN — AMLODIPINE BESYLATE 10 MG: 5 TABLET ORAL at 09:05

## 2020-09-10 RX ADMIN — CETIRIZINE HYDROCHLORIDE 5 MG: 10 TABLET, FILM COATED ORAL at 09:04

## 2020-09-10 RX ADMIN — HYDRALAZINE HYDROCHLORIDE 50 MG: 25 TABLET, FILM COATED ORAL at 20:49

## 2020-09-10 RX ADMIN — LEVETIRACETAM 500 MG: 500 TABLET ORAL at 20:49

## 2020-09-10 RX ADMIN — TAMSULOSIN HYDROCHLORIDE 0.8 MG: 0.4 CAPSULE ORAL at 09:04

## 2020-09-10 RX ADMIN — Medication 10 ML: at 20:48

## 2020-09-10 RX ADMIN — OXYCODONE HYDROCHLORIDE AND ACETAMINOPHEN 500 MG: 500 TABLET ORAL at 09:05

## 2020-09-10 RX ADMIN — CLOPIDOGREL BISULFATE 75 MG: 75 TABLET ORAL at 09:05

## 2020-09-10 RX ADMIN — ROSUVASTATIN CALCIUM 20 MG: 10 TABLET, FILM COATED ORAL at 20:49

## 2020-09-10 NOTE — PLAN OF CARE
Problem: Patient Care Overview  Goal: Plan of Care Review  Outcome: Ongoing (interventions implemented as appropriate)  Flowsheets  Taken 9/10/2020 1548  Progress: no change  Outcome Summary: pt is uncooperative. cluster care education given again. a&o x4. RA. will continue to monitor.  Taken 9/10/2020 0701  Plan of Care Reviewed With: patient  Goal: Individualization and Mutuality  Outcome: Ongoing (interventions implemented as appropriate)  Goal: Discharge Needs Assessment  Outcome: Ongoing (interventions implemented as appropriate)  Goal: Interprofessional Rounds/Family Conf  Outcome: Ongoing (interventions implemented as appropriate)     Problem: Fall Risk (Adult)  Goal: Identify Related Risk Factors and Signs and Symptoms  Outcome: Ongoing (interventions implemented as appropriate)     Problem: Skin Injury Risk (Adult)  Goal: Identify Related Risk Factors and Signs and Symptoms  Outcome: Ongoing (interventions implemented as appropriate)  Goal: Skin Health and Integrity  Outcome: Ongoing (interventions implemented as appropriate)

## 2020-09-10 NOTE — PROGRESS NOTES
"Continued Stay Note  [facility]     Patient Name: Ivan Maradiaga  MRN: [id number]  Today's Date: 9/10/2020    Admit Date: 8/23/2020    Karuna Garza CM placed a conference call with myself & Melisa Jones to daughter Maureen to issue the HINN 12 & the IM letter. We had a lengthy discussion with her in regards to the multiple facilities that have accepted her father, including [facility] & [facility]. Maureen continues to refuse these options stating that \"they are unacceptable\".  We explained to Maureen that we do understand that she is not happy with the limited accepting facilities that are available & have accepted but as a facility, we have provided a safe discharge option to post acute care that he requires. Brenna's recommendation was to issue the HINN 12 & IM letter & allow Maureen to file another discharge appeal if she continues to feel that her father is not ready to discharge. The discharge order is still in place from 8/28/20 and after reviewing multiple notes from doctors, physical therapy, & occupational therapy, the patient is still medically ready for discharge to next level of care.     Regina Thompson RN    "

## 2020-09-10 NOTE — PROGRESS NOTES
Nutrition Services    Patient Name:  Ivan Maradiaga  YOB: 1950  MRN: [id number]  Admit Date:  8/23/2020    Progress note:  Review for LOS F/u.    Patient continues to eat well, 93% meal average for the last 10 meals.  PO diet order: Healthy Heart    Weight continues to be stable, 194lb, admit weight 195lb    +BM 9/7, 3 days if no BM today    No current labs are noted.    Patient awaiting d/c appeal.    Skin remains intact.      Electronically signed by:  Dalila Ramesh RD  09/10/20 11:19

## 2020-09-10 NOTE — PLAN OF CARE
Problem: Patient Care Overview  Goal: Plan of Care Review  Outcome: Ongoing (interventions implemented as appropriate)  Flowsheets (Taken 9/10/2020 9694)  Plan of Care Reviewed With: patient  Note:   Patient without physical complaints. Encouraged patient independence and educated on importance of turning to prevent pressure injuries. Patient is noncompliant with turning and frequently seeks out staff. Will continue to monitor.      Problem: Fall Risk (Adult)  Goal: Identify Related Risk Factors and Signs and Symptoms  Outcome: Ongoing (interventions implemented as appropriate)     Problem: Skin Injury Risk (Adult)  Goal: Identify Related Risk Factors and Signs and Symptoms  Outcome: Ongoing (interventions implemented as appropriate)     Problem: Skin Injury Risk (Adult)  Goal: Skin Health and Integrity  Outcome: Ongoing (interventions implemented as appropriate)

## 2020-09-10 NOTE — NURSING NOTE
Pt has expressed excessively over several occasions throughout the past 2 days the desire to go to rehab.

## 2020-09-10 NOTE — PROGRESS NOTES
[facility] Medicine Services Daily Progress Note      Hospitalist Team  LOS 17 days      Patient Care Team:  Kelly Gill DO as PCP - General (Physical Medicine and Rehabilitation)    Patient Location: 203/1      Subjective   Subjective     Chief Complaint / Subjective  Chief Complaint   Patient presents with   • Weakness - Generalized         Brief Synopsis of Hospital Course/HPI  70-year-old man with history of CAD, dementia, CVA with left-sided residual weakness ,? CHF and hypertension.  According to admission note and talking to his daughter--> he was recently treated at [facility] for STEMI and rhabdomyolysis in the setting of acute kidney injury after a mechanical fall.  He was subsequently discharged to rehab facility for continued physical therapy. Apparently, he was discharged from rehab on 8/22/2020.  He presented to [facility] ED with  AMS/encephalopathy.  He endorses  mild chest discomfort  shortness of breath with scant  productive cough.  No reported fever, chills, no night sweats, and no abdominal pain nausea or vomiting.  On arrival to [facility], he was screened and tested positive for COVID-19 and subsequently placed on isolation.  Chest x-ray revealed patchy peripheral airspace opacity concerning for bilateral multifocal pneumonia.      Review of record here at [facility], [place] showed multiple testing here on 8/14 and 8/15 which were negative.  However, he had another testing done on 8/23/2020 which was positive.   Otherwise, he remained clinically stable and oxygenating well on room air. Laboratory showed static, nonzero slight evaded troponin and EKG showed sinus rhythm with RBBB.        He was evaluated by cardiologist and ID.  Patient does not require Remdisivir with stable oxygenation on RA.  He was  maintained on isolation and treated with IV antibiotic with Zosyn, and later switched to  Augmentin for COVID related pneumonia possible secondary  bacterial infection.  The patient was felt to be stable for discharge however his daughter appealed to Medicare per nursing staff reporting that the patient's daughter wanted the patient's COVID test to be negative prior to discharge.     8/31/2020: The patient denies current complaints.  He is anxious to go home and is asking when his COVID test will be repeated.    9/1/2020: Patient denies current complaints except for feeling cold and asking for his blankets.  I spoke with the patient's daughter Maureen.  She is requesting repeat COVID testing so that if the patient is negative he will have more options for inpatient rehab since only one facility locally is taking COVID positive patients.  The COVID test was ordered resulted as positive.  Patient's daughter was notified.  9/2/2020: Patient denies c/o except that his bed is not comfortable.  He is anxious to go home.  He was counseled on the difficulties of sending him home when it is not safe for him to be there alone.   9/3/2020: Patient reports feeling cold but otherwise denies current complaints.  9/4/2020: The patient denies current complaints.  9/5/2020: Patient reports no current complaints.  He reports that he is too weak to walk.  9/6/2020: Patient denies current complaints except for some mild postnasal drainage, generalized weakness, and the bed is uncomfortable.  COVID test was performed again on 9/6/2020 because if it is negative he should be able to discharge to a noncovered facility.  If it is positive, he has to stay here because his daughter refuses to let him go to a COVID positive facility and he is not safe to discharge to home.  His COVID test was positive again today.    9/7/20 no new complaints, denies SOA, cough or chest pain, awaiting placement, family appealed discharge  9/8/20-no new complaints, no events overnight VSS, DW with family.    9/9/20-no new complaints, PT recommending rehab. Awaiting placement, [facility] will not accept covid  "positive patients.     9/10/20 no new complaints, patient is ready for discharge,  Daughter appealing discharge for the second time, a discharge plan in place referal made, appeal to discharge was filled again by daughter on 9/9/20 rationale given is that the hospital did not provide a safe discharge plan.  It is the facility's standing that viable options for a safe discharge plan were provided in finding accepting skilled nursing facilities capable of providing the post acute care this patient needs. The patient's daughter declines offered facilities and states they are unacceptable.    Review of Systems   HENT: Negative.    Eyes: Positive for blurred vision.   Cardiovascular: Negative.    Respiratory: Negative.    Endocrine: Negative.    Hematologic/Lymphatic: Negative.    Skin: Negative.    Musculoskeletal: Negative.    Gastrointestinal: Negative.    Genitourinary: Negative.    Neurological: Positive for weakness.   Psychiatric/Behavioral: Negative.    Allergic/Immunologic: Negative.    All other systems reviewed and are negative.    12 point review of systems was reviewed and was negative.    Objective   Objective      Vital Signs  Temp:  [97 °F (36.1 °C)-98.4 °F (36.9 °C)] 98.2 °F (36.8 °C)  Heart Rate:  [64-87] 64  Resp:  [16-18] 18  BP: (108-144)/(61-89) 141/78  Oxygen Therapy  SpO2: 93 %  Pulse Oximetry Type: Intermittent  Device (Oxygen Therapy): room air  Device (Oxygen Therapy): room air  Flow (L/min): 2  Flowsheet Rows      First Filed Value   Admission Height  182.9 cm (72\") Documented at 08/23/2020 0314   Admission Weight  78 kg (171 lb 15.3 oz) Documented at 08/23/2020 0314        Intake & Output (last 3 days)       09/07 0701 - 09/08 0700 09/08 0701 - 09/09 0700 09/09 0701 - 09/10 0700 09/10 0701 - 09/11 0700    P.O. 720 400 540     Total Intake(mL/kg) 720 (8.2) 400 (4.5) 540 (6.1)     Urine (mL/kg/hr) 575 (0.3) 325 (0.2) 300 (0.1)     Stool 0       Total Output 575 325 300     Net +145 +75 +240     "         Stool Unmeasured Occurrence 3 x           Lines, Drains & Airways    Active LDAs     Name:   Placement date:   Placement time:   Site:   Days:    Peripheral IV 08/29/20 Posterior;Right Forearm   08/29/20    --    Forearm   5              Physical Exam   well-developed well-nourished gentleman lying in bed comfortable on room air awake and alert in no acute distress; mucous membranes moist; lungs clear to auscultation bilaterally; CV regular rate and rhythm; abdomen soft and nontender; extremities with no edema or calf tenderness  Procedures:    Results Review:     I reviewed the patient's new clinical results.    Lab Results (last 24 hours)     ** No results found for the last 24 hours. **        No results found for: HGBA1C            No results found for: LIPASE  No results found for: CHOL, CHLPL, TRIG, HDL, LDL, LDLDIRECT    No results found for: INTRAOP, PREDX, FINALDX, COMDX    Microbiology Results (last 10 days)     Procedure Component Value - Date/Time    Respiratory Panel PCR w/COVID-19(SARS-CoV-2) [facility]/NANDINI/ANA/PAD/[facility] In-House, NP Swab in UTM/VTM, 3-4 HR TAT - Swab, Nasopharynx [[id number]]  (Abnormal) Collected:  09/06/20 1632    Lab Status:  Final result Specimen:  Swab from Nasopharynx Updated:  09/06/20 1832     ADENOVIRUS, PCR Not Detected     Coronavirus 229E Not Detected     Coronavirus HKU1 Not Detected     Coronavirus NL63 Not Detected     Coronavirus OC43 Not Detected     COVID19 Detected     Human Metapneumovirus Not Detected     Human Rhinovirus/Enterovirus Not Detected     Influenza A PCR Not Detected     Influenza A H1 Not Detected     Influenza A H1 2009 PCR Not Detected     Influenza A H3 Not Detected     Influenza B PCR Not Detected     Parainfluenza Virus 1 Not Detected     Parainfluenza Virus 2 Not Detected     Parainfluenza Virus 3 Not Detected     Parainfluenza Virus 4 Not Detected     RSV, PCR Not Detected     Bordetella pertussis pcr Not Detected     Bordetella parapertussis  PCR Not Detected     Chlamydophila pneumoniae PCR Not Detected     Mycoplasma pneumo by PCR Not Detected    Narrative:       Fact sheet for providers: https://docs.[employer]/wp-content/uploads/QRE7097-1086-GO3.1-EUA-Provider-Fact-Sheet-3.pdf    Fact sheet for patients: https://docs.[employer]/wp-content/uploads/IWX8304-3005-RF4.1-EUA-Patient-Fact-Sheet-1.pdf    Respiratory Panel PCR w/COVID-19(SARS-CoV-2) [facility]/NANDINI/ANA/PAD/[facility] In-House, NP Swab in UTM/VTM, 3-4 HR TAT - Swab, Nasopharynx [[id number]]  (Abnormal) Collected:  09/01/20 1424    Lab Status:  Final result Specimen:  Swab from Nasopharynx Updated:  09/01/20 1558     ADENOVIRUS, PCR Not Detected     Coronavirus 229E Not Detected     Coronavirus HKU1 Not Detected     Coronavirus NL63 Not Detected     Coronavirus OC43 Not Detected     COVID19 Detected     Human Metapneumovirus Not Detected     Human Rhinovirus/Enterovirus Not Detected     Influenza A PCR Not Detected     Influenza A H1 Not Detected     Influenza A H1 2009 PCR Not Detected     Influenza A H3 Not Detected     Influenza B PCR Not Detected     Parainfluenza Virus 1 Not Detected     Parainfluenza Virus 2 Not Detected     Parainfluenza Virus 3 Not Detected     Parainfluenza Virus 4 Not Detected     RSV, PCR Not Detected     Bordetella pertussis pcr Not Detected     Bordetella parapertussis PCR Not Detected     Chlamydophila pneumoniae PCR Not Detected     Mycoplasma pneumo by PCR Not Detected    Narrative:       Fact sheet for providers: https://docs.[employer]/wp-content/uploads/MZG7657-5731-QK3.1-EUA-Provider-Fact-Sheet-3.pdf    Fact sheet for patients: https://docs.[employer]/wp-content/uploads/UNC7140-8879-FI3.1-EUA-Patient-Fact-Sheet-1.pdf          ECG/EMG Results (most recent)     Procedure Component Value Units Date/Time    ECG 12 Lead [[id number]] Collected:  08/23/20 0619     Updated:  08/23/20 0621    Narrative:       HEART RATE= 91  bpm  RR Interval= 660  ms  HI Interval= 163   ms  P Horizontal Axis= 24  deg  P Front Axis= 64  deg  QRSD Interval= 152  ms  QT Interval= 382  ms  QRS Axis= -83  deg  T Wave Axis= -32  deg  - ABNORMAL ECG -  Sinus rhythm  RBBB and LAFB  Probable lateral infarct, age indeterminate  Electronically Signed By:   Date and Time of Study: 2020-08-23 06:19:41    ECG 12 Lead [[id number]] Collected:  08/26/20 1325     Updated:  08/31/20 2032    Narrative:       HEART RATE= 98  bpm  RR Interval= 612  ms  DC Interval= 163  ms  P Horizontal Axis= 40  deg  P Front Axis= 60  deg  QRSD Interval= 154  ms  QT Interval= 396  ms  QRS Axis= 258  deg  T Wave Axis= -53  deg  - ABNORMAL ECG -  Sinus rhythm  Ventricular premature complex  Inferior infarct, old  When compared with ECG of 23-Aug-2020 6:19:41,  No significant change  Electronically Signed By: Jorge Chacon (ANA) 31-Aug-2020 20:08:03  Date and Time of Study: 2020-08-26 13:25:06              No radiology results for the last 7 days    Xrays, labs reviewed personally by physician.  Medication Review:   I have reviewed the patient's current medication list    Scheduled Meds    amLODIPine 10 mg Oral Daily   cetirizine 5 mg Oral Daily   clopidogrel 75 mg Oral Daily   enoxaparin 40 mg Subcutaneous Q24H   finasteride 5 mg Oral Daily   hydrALAZINE 50 mg Oral BID   levETIRAcetam 500 mg Oral BID   losartan 50 mg Oral Q24H   rosuvastatin 20 mg Oral Daily   sodium chloride 10 mL Intravenous Q12H   tamsulosin 0.8 mg Oral Daily   traZODone 50 mg Oral Nightly   vitamin C 500 mg Oral Daily   zinc gluconate 50 mg Oral Daily     Meds Infusions     Meds PRN  •  acetaminophen **OR** acetaminophen **OR** acetaminophen  •  benzonatate  •  calcium carbonate  •  melatonin  •  ondansetron **OR** ondansetron  •  potassium chloride  •  potassium chloride  •  sodium chloride    Assessment/Plan   Assessment/Plan     Active Hospital Problems    Diagnosis  POA   • **Pneumonia due to COVID-19 virus [U07.1, J12.89]  Unknown   • Delirium [R41.0]  Yes    • Weakness [R53.1]  Yes      Resolved Hospital Problems   No resolved problems to display.     MEDICAL DECISION MAKING COMPLEXITY BY PROBLEM:     Assessment and plan:  Acute COVID-19 pneumonia   -Patient retested and was still positive for COVID-19 on 9/1/2020    Possible secondary bacterial pneumonia, resolved        -oxygenating well on room air          -completed IV Zosyn followed by Augmentin     Coronary disease, recent non-ST elevation MI         -on Amlodipine/Plavix/hydralazine/Crestor         -Cardiology saw the patient in consultation and the patient is felt to be stable             Essential hypertension, chronic and controlled         -Continue amlodipine /hydralazine      BPH       -Continue Proscar/Flomax     History of CVA with residual left-sided hemiparesis        -Continue Plavix/Crestor, and bedside range of motion as tolerated     Disposition:  He was discharged 8/28/2020, however, daughter contested discharge which was upheld and then she appealed the denial. Brenna reviewed the appeal and on reconsideration it was determined that the patient services should continue without liability until further notice.  I spoke with Melisa Jones utilization review coordinator with  at [facility] and she is going to be contacting Brenna for further clarity.    9/8/2020-JF RN and daughter, not wanting patient  to go back home, awaiting  input regarding discharge        VTE Prophylaxis -   Mechanical Order History:     None      Pharmalogical Order History:     Ordered     Dose Route Frequency Stop    08/23/20 0559  enoxaparin (LOVENOX) syringe 40 mg      40 mg SC Every 24 Hours --        Code Status -   Code Status and Medical Interventions:   Ordered at: 08/23/20 0559     Code Status:    CPR     Medical Interventions (Level of Support Prior to Arrest):    Full     This patient has been examined wearing appropriate Personal Protective Equipment   09/10/20    Discharge Planning  Pending rehab placement      Per     A NEW DISCHARGE APPEAL HAS BEEN FILED ON THIS CASE.     Please note: The first discharge appeal went to reconsideration and the patient won that appeal; thus, the first appeal is complete. However, the rationale given is that the hospital did not provide a safe discharge plan.  It is the facility's standing that viable options for a safe discharge plan were provided in finding accepting skilled nursing facilities capable of providing the post acute care this patient needs. The patient's daughter declines offered facilities and states they are unacceptable; she wants the patient to remain inpatient until a negative Covid test has been obtained.  This information was shared with Brenna, and the advice they gave us was to issue HINN 12 and proceed with the discharge order still in place, and we will advise the family has the right to file a new appeal since the first is complete. The discharge order from 8/28/20 remains active on this account.  The HINN 12 and a detailed notice of discharge readiness was was called to patient's daughter, Maurene, on 9/9/20. Maureen said she would be filing a new appeal at that time.     Electronically signed by Tone Dia MD, 09/10/20, 09:27.  [facility] Hospitalist Team

## 2020-09-10 NOTE — PROGRESS NOTES
A NEW DISCHARGE APPEAL HAS BEEN FILED ON THIS CASE.    Please note: The first discharge appeal went to reconsideration and the patient won that appeal; thus, the first appeal is complete. However, the rationale given is that the hospital did not provide a safe discharge plan.  It is the facility's standing that viable options for a safe discharge plan were provided in finding accepting skilled nursing facilities capable of providing the post acute care this patient needs. The patient's daughter declines offered facilities and states they are unacceptable; she wants the patient to remain inpatient until a negative Covid test has been obtained.  This information was shared with Niya, and the advice they gave us was to issue HINN 12 and proceed with the discharge order still in place, and we will advise the family has the right to file a new appeal since the first is complete. The discharge order from 8/28/20 remains active on this account.  The HINN 12 and a detailed notice of discharge readiness was was called to patient's daughter, Maureen, on 9/9/20. Maureen said she would be filing a new appeal at that time.     -------------------------------------------------------------------------------  ACTIVE DISCHARGE APPEAL INFORMATION    Daughter filed a new discharge appeal on 9/9/20:  NIYA CASE REF ID :  [id number]    PATIENT CANNOT BE DISCHARGED WHILE THIS APPEAL IS ACTIVE UNLESS THE PATIENT/FAMILY CALL TO RESCIND THE APPEAL.     NIYA PHONE:  [phone]    PAR[facility] [address]  [address]    Return Contact (Weekday):  Ph: [phone]  Fx: [phone]    Return Contact (Weekend):   Ph: [phone]  Fx: [phone]

## 2020-09-10 NOTE — PROGRESS NOTES
Continued Stay Note  [facility]     Patient Name: Ivan Maradiaga  MRN: [id number]  Today's Date: 9/10/2020    Admit Date: 8/23/2020    Discharge Plan     Row Name 09/10/20 0828       Plan    Plan  DC Plan: APPEAL to discharge placed 9/09/20. [facility] declined pt for inpt rehab.    Plan Comments  Maureen Donald, daughter informed 9/10/20  0820 that [facility] had declined her father, the pt for inpt rehab. Pt is COVID +.                Expected Discharge Date and Time     Expected Discharge Date Expected Discharge Time    Aug 28, 2020             Karuna Garza RN

## 2020-09-10 NOTE — PROGRESS NOTES
[facility]     RECORDS SENT ON 9/10/20; [employer] STATES ALL RECORDS HAVE BEEN RECEIVED AS OF 9/10 @ 1:15PM.  PENDING DETERMINATION.     [facility] CASE REF ID :  [id number]    PATIENT CANNOT BE DISCHARGED WHILE THIS APPEAL IS ACTIVE UNLESS THE PATIENT/FAMILY CALL TO RESCIND THE APPEAL.     NIYA PHONE:  [phone]    LADARIUS RANDLE COORDINATOR  Care Coordination  [address]  [address]    Return Contact (Weekday):  Ph: [phone]  Fx: [phone]    Return Contact (Weekend):   Ph: [phone]  Fx: [phone]

## 2020-09-10 NOTE — PLAN OF CARE
Problem: Patient Care Overview  Goal: Plan of Care Review  Outcome: Ongoing (interventions implemented as appropriate)  Flowsheets (Taken 9/10/2020 1609)  Outcome Summary: Pt demonstrated slight regression in functional mobility this date. Pt reported being tired when PT entered but was agreeable and cooperative with treatment. Pt required MOD A to come to sitting EOB, pt with some cueing for posture and maintaining midline while seated. Pt brushed teeth on EOB, and demonstrated good ability to maintain seated balance. Pt then attempted to come to standing with MAX A with multiple attempts but was unsuccessful. Hand placement, foot placement, transfer strategies were adjusted, but pt was unable to come to standing this date. Following treatment pt returned to supine in bed. Pt is well below functional mobilty and will benefit from continued skilled services. IP rehab is recommended following d/c. PPE worn includes gloves, surgical mask, shield, n95, gown, and hair bonnet

## 2020-09-10 NOTE — THERAPY TREATMENT NOTE
Patient Name: Ivan Maradiaga  : 1950    MRN: [id number]                              Today's Date: 9/10/2020       Admit Date: 2020    Visit Dx:     ICD-10-CM ICD-9-CM   1. Delirium R41.0 780.09   2. Pneumonia due to COVID-19 virus U07.1 480.8    J12.89    3. Weakness R53.1 780.79     Patient Active Problem List   Diagnosis   • Delirium   • Weakness   • Pneumonia due to COVID-19 virus     Past Medical History:   Diagnosis Date   • CAD (coronary artery disease)    • CHF (congestive heart failure) (CMS/[facility])    • Dementia (CMS/[facility])    • Hypertension      History reviewed. No pertinent surgical history.  General Information     Row Name 09/10/20 155          PT Evaluation Time/Intention    Document Type  therapy note (daily note)  -     Mode of Treatment  physical therapy  -     Row Name 09/10/20 155          General Information    Patient Profile Reviewed?  yes  -       User Key  (r) = Recorded By, (t) = Taken By, (c) = Cosigned By    Initials Name Provider Type    Bradley Alaniz, PT Physical Therapist        Mobility     Row Name 09/10/20 155          Bed Mobility Assessment/Treatment    Bed Mobility Assessment/Treatment  supine-sit;sit-supine  -EL     Supine-Sit [place] (Bed Mobility)  moderate assist (50% patient effort)  -EL     Sit-Supine [place] (Bed Mobility)  minimum assist (75% patient effort)  -EL     Assistive Device (Bed Mobility)  bed rails;draw sheet  -     Row Name 09/10/20 155          Transfer Assessment/Treatment    Comment (Transfers)  Pt attempted to come to standing, with Max A, blocking L knee and pt was unable to come to standing this date  -     Row Name 09/10/20 155          Bed-Chair Transfer    Bed-Chair [place] (Transfers)  not tested  -     Row Name 09/10/20 155          Sit-Stand Transfer    Sit-Stand [place] (Transfers)  unable to assess  -       User Key  (r) = Recorded By, (t) = Taken By, (c) = Cosigned By    Initials Name Provider Type     Bradley Alaniz, PT Physical Therapist        Obj/Interventions    No documentation.       Goals/Plan    No documentation.       Clinical Impression     Row Name 09/10/20 1603          Pain Scale: Numbers Pre/Post-Treatment    Pain Scale: Numbers, Pretreatment  0/10 - no pain  -     Pain Scale: Numbers, Post-Treatment  0/10 - no pain  -     Row Name 09/10/20 1603          Plan of Care Review    Plan of Care Reviewed With  patient  -EL     Outcome Summary  Pt demonstrated slight regression in functional mobility this date. Pt reported being tired when PT entered but was agreeable and cooperative with treatment. Pt required MOD A to come to sitting EOB, pt with some cueing for posture and maintaining midline while seated. Pt brushed teeth on EOB, and demonstrated good ability to maintain seated balance. Pt then attempted to come to standing with MAX A with multiple attempts but was unsuccessful. Hand placement, foot placement, transfer strategies were adjusted, but pt was unable to come to standing this date. Following treatment pt returned to supine in bed. Pt is well below functional mobilty and will benefit from continued skilled services. IP rehab is recommended following d/c. PPE worn includes gloves, surgical mask, shield, n95, gown, and hair saskia  -     Row Name 09/10/20 1603          Physical Therapy Clinical Impression    Criteria for Skilled Interventions Met (PT Clinical Impression)  yes  -EL     Rehab Potential (PT Clinical Summary)  fair, will monitor progress closely  [facility]     Row Name 09/10/20 1603          Vital Signs    Pretreatment Heart Rate (beats/min)  100  -EL     Intratreatment Heart Rate (beats/min)  128  -EL     Posttreatment Heart Rate (beats/min)  96  -EL     O2 Delivery Pre Treatment  room air  -EL     O2 Delivery Intra Treatment  room air  -EL     Post SpO2 (%)  97  -EL     O2 Delivery Post Treatment  room air  -EL     Pre Patient Position  Supine  -EL     Intra Patient Position   Sitting  -EL     Post Patient Position  Supine  -EL     Row Name 09/10/20 1603          Positioning and Restraints    Pre-Treatment Position  in bed  -EL     Post Treatment Position  bed  -EL     In Bed  notified nsg;supine;encouraged to call for assist;exit alarm on;call light within reach  -EL       User Key  (r) = Recorded By, (t) = Taken By, (c) = Cosigned By    Initials Name Provider Type    Bradley Alaniz PT Physical Therapist        Outcome Measures     Row Name 09/10/20 1609          How much help from another person do you currently need...    Turning from your back to your side while in flat bed without using bedrails?  3  -EL     Moving from lying on back to sitting on the side of a flat bed without bedrails?  2  -EL     Moving to and from a bed to a chair (including a wheelchair)?  1  -EL     Standing up from a chair using your arms (e.g., wheelchair, bedside chair)?  2  -EL     Climbing 3-5 steps with a railing?  1  -EL     To walk in hospital room?  1  -EL     AM-PAC 6 Clicks Score (PT)  10  -EL     Row Name 09/10/20 1609          Modified [place] Scale    Pre-Stroke Modified Ronny Scale  2 - Slight disability.  Unable to carry out all previous activities but able to look after own affairs without assistance.  -EL     Modified Ronny Scale  5 - Severe disability.  Bedridden, incontinent, and requiring constant nursing care and attention.  -EL     Row Name 09/10/20 1609          Functional Assessment    Outcome Measure Options  AM-PAC 6 Clicks Basic Mobility (PT);Modified [place]  -EL       User Key  (r) = Recorded By, (t) = Taken By, (c) = Cosigned By    Initials Name Provider Type    Bradley Alaniz PT Physical Therapist        Physical Therapy Education                 Title: PT OT SLP Therapies (Done)     Topic: Physical Therapy (Done)     Point: Mobility training (Done)     Description:   Instruct learner(s) on safety and technique for assisting patient out of bed, chair or wheelchair.  Instruct in  the proper use of assistive devices, such as walker, crutches, cane or brace.              Patient Friendly Description:   It's important to get you on your feet again, but we need to do so in a way that is safe for you. Falling has serious consequences, and your personal safety is the most important thing of all.        When it's time to get out of bed, one of us or a family member will sit next to you on the bed to give you support.     If your doctor or nurse tells you to use a walker, crutches, a cane, or a brace, be sure you use it every time you get out of bed, even if you think you don't need it.    Learning Progress Summary           Patient Acceptance, E,TB, VU by EL at 9/10/2020 1609    Acceptance, E,TB, VU by EL at 9/8/2020 1811    Acceptance, TB, DU by KI at 9/6/2020 0911    Acceptance, E, VU by DD at 9/6/2020 0454    Acceptance, TB, DU by KI at 9/5/2020 1217    Acceptance, TB, DU by KI at 9/5/2020 1215    Acceptance, E,TB,D, VU,DU,NR by  at 9/4/2020 0232    Acceptance, E, NR by  at 9/2/2020 1618    Acceptance, E, NR by HC at 8/31/2020 1602    Acceptance, E, VU by  at 8/29/2020 1025    Acceptance, E, NR by  at 8/28/2020 1614    Acceptance, E,TB, VU by EL at 8/27/2020 1551    Acceptance, E, VU by KS at 8/26/2020 2332    Acceptance, E,TB, VU by  at 8/26/2020 0312    Acceptance, E,TB, VU by EL at 8/25/2020 1536    Acceptance, E,TB, VU,NR by  at 8/24/2020 1138                               User Key     Initials Effective Dates Name Provider Type Discipline     04/17/20 -  Corinne He, PT Physical Therapist PT    CM 03/01/19 -  Mirna Bear, PT Physical Therapist PT    EL 06/23/20 -  Bradley Daniel, PT Physical Therapist PT    KI 03/01/19 -  Tayler Camargo RN Registered Nurse Nurse     06/24/19 -  Juvencio Burton LPN Licensed Nurse Nurse    SW 06/24/19 -  Rita Jones LPN Licensed Nurse Nurse    DARRICK 06/24/19 -  Ranjana Ahuja LPN Licensed Nurse Nurse    SS 06/24/19 -   Manuela Dorsey LPN Licensed Nurse Nurse    KS 03/01/19 -  Rizwana Yuan, RN Registered Nurse Nurse              PT Recommendation and Plan  Planned Therapy Interventions (PT Eval): balance training, neuromuscular re-education, patient/family education, transfer training, gait training, strengthening  Outcome Summary/Treatment Plan (PT)  Anticipated Discharge Disposition (PT): inpatient rehabilitation facility  Plan of Care Reviewed With: patient  Outcome Summary: Pt demonstrated slight regression in functional mobility this date. Pt reported being tired when PT entered but was agreeable and cooperative with treatment. Pt required MOD A to come to sitting EOB, pt with some cueing for posture and maintaining midline while seated. Pt brushed teeth on EOB, and demonstrated good ability to maintain seated balance. Pt then attempted to come to standing with MAX A with multiple attempts but was unsuccessful. Hand placement, foot placement, transfer strategies were adjusted, but pt was unable to come to standing this date. Following treatment pt returned to supine in bed. Pt is well below functional mobilty and will benefit from continued skilled services. IP rehab is recommended following d/c. PPE worn includes gloves, surgical mask, shield, n95, gown, and hair bonnet     Time Calculation:   PT Charges     Row Name 09/10/20 1610             Time Calculation    Start Time  1608  -EL      Stop Time  1632  -EL      Time Calculation (min)  24 min  -EL      PT Received On  09/10/20  -EL      PT - Next Appointment  09/11/20  -EL      PT Goal Re-Cert Due Date  09/24/20  -EL        User Key  (r) = Recorded By, (t) = Taken By, (c) = Cosigned By    Initials Name Provider Type    Bradley Alaniz PT Physical Therapist        Therapy Charges for Today     Code Description Service Date Service Provider Modifiers Qty    00185373343  PT THERAPEUTIC ACT EA 15 MIN 9/10/2020 Bradley Daniel, PT GP 1    [id number]  PT NEUROMUSC RE  EDUCATION EA 15 MIN 9/10/2020 Bradley Daniel, PT GP 1          PT G-Codes  Outcome Measure Options: AM-PAC 6 Clicks Basic Mobility (PT), Modified [place]  AM-PAC 6 Clicks Score (PT): 10  Modified [place] Scale: 5 - Severe disability.  Bedridden, incontinent, and requiring constant nursing care and attention.    Bradley Daniel, PT  9/10/2020

## 2020-09-11 VITALS
DIASTOLIC BLOOD PRESSURE: 67 MMHG | OXYGEN SATURATION: 98 % | WEIGHT: 194.45 LBS | BODY MASS INDEX: 24.95 KG/M2 | HEIGHT: 74 IN | HEART RATE: 71 BPM | TEMPERATURE: 97.7 F | RESPIRATION RATE: 18 BRPM | SYSTOLIC BLOOD PRESSURE: 107 MMHG

## 2020-09-11 LAB

## 2020-09-11 PROCEDURE — 99239 HOSP IP/OBS DSCHRG MGMT >30: CPT | Performed by: INTERNAL MEDICINE

## 2020-09-11 PROCEDURE — 97530 THERAPEUTIC ACTIVITIES: CPT

## 2020-09-11 PROCEDURE — 0202U NFCT DS 22 TRGT SARS-COV-2: CPT | Performed by: INTERNAL MEDICINE

## 2020-09-11 PROCEDURE — 97535 SELF CARE MNGMENT TRAINING: CPT

## 2020-09-11 PROCEDURE — 97112 NEUROMUSCULAR REEDUCATION: CPT

## 2020-09-11 RX ADMIN — CETIRIZINE HYDROCHLORIDE 5 MG: 10 TABLET, FILM COATED ORAL at 08:25

## 2020-09-11 RX ADMIN — CLOPIDOGREL BISULFATE 75 MG: 75 TABLET ORAL at 08:25

## 2020-09-11 RX ADMIN — AMLODIPINE BESYLATE 10 MG: 5 TABLET ORAL at 08:25

## 2020-09-11 RX ADMIN — Medication 50 MG: at 08:25

## 2020-09-11 RX ADMIN — FINASTERIDE 5 MG: 5 TABLET, FILM COATED ORAL at 08:25

## 2020-09-11 RX ADMIN — LOSARTAN POTASSIUM 50 MG: 50 TABLET, FILM COATED ORAL at 08:25

## 2020-09-11 RX ADMIN — OXYCODONE HYDROCHLORIDE AND ACETAMINOPHEN 500 MG: 500 TABLET ORAL at 08:25

## 2020-09-11 RX ADMIN — HYDRALAZINE HYDROCHLORIDE 50 MG: 25 TABLET, FILM COATED ORAL at 08:25

## 2020-09-11 RX ADMIN — LEVETIRACETAM 500 MG: 500 TABLET ORAL at 08:25

## 2020-09-11 RX ADMIN — Medication 10 ML: at 08:25

## 2020-09-11 RX ADMIN — TAMSULOSIN HYDROCHLORIDE 0.8 MG: 0.4 CAPSULE ORAL at 08:25

## 2020-09-11 NOTE — PROGRESS NOTES
Continued Stay Note  [facility]     Patient Name: Ivan Maradiaga  MRN: [id number]  Today's Date: 9/11/2020    Admit Date: 8/23/2020    Discharge Plan     Row Name 09/11/20 1312       Plan    Plan  DC Plan: Pt has been accepted per [facility]. Will call room # when available. to CM or to PEDS for report. Will need transporation arranged.    Row Name 09/11/20 7883       Plan    Plan  DC Plan: Notified the discharge has been upheld by NIYA. The pt has until noon tomorrow 9/12/20 to discharge or will start incurring llabilitiy. [facility]               Expected Discharge Date and Time     Expected Discharge Date Expected Discharge Time    Aug 28, 2020             Karuna Garza RN

## 2020-09-11 NOTE — THERAPY TREATMENT NOTE
Acute Care - Occupational Therapy Treatment Note   [facility]on     Patient Name: Ivan Maradiaga  : 1950  MRN: [id number]  Today's Date: 2020             Admit Date: 2020       ICD-10-CM ICD-9-CM   1. Delirium R41.0 780.09   2. Pneumonia due to COVID-19 virus U07.1 480.8    J12.89    3. Weakness R53.1 780.79     Patient Active Problem List   Diagnosis   • Delirium   • Weakness   • Pneumonia due to COVID-19 virus     Past Medical History:   Diagnosis Date   • CAD (coronary artery disease)    • CHF (congestive heart failure) (CMS/HCC)    • Dementia (CMS/[facility])    • Hypertension      History reviewed. No pertinent surgical history.    Therapy Treatment    Rehabilitation Treatment Summary     Row Name 20 1330             Treatment Time/Intention    Discipline  occupational therapist  -LOBO      Document Type  therapy note (daily note)  -LOBO      Subjective Information  no complaints  -LOBO      Mode of Treatment  co-treatment;physical therapy  -LOBO      Recorded by [LOBO] Rebecca Lipscomb OT 20 1618      Row Name 20 1330             Vital Signs    Recovery Time  WFL VSS  -LOBO      Recorded by [LOBO] Rebecca Lipscomb OT 20 1618      Row Name 20 1330             Cognitive Assessment/Intervention- PT/OT    Affect/Mental Status (Cognitive)  WNL  -LOBO      Orientation Status (Cognition)  oriented x 4  -LOBO      Follows Commands (Cognition)  WNL  -LOBO      Recorded by [LOBO] Rebecca Lipscomb OT 20 1618      Row Name 20 1330             Functional Mobility    Functional Mobility- Ind. Level  unable to perform  -LOBO      Recorded by [LOBO] Rebecca Lipscomb OT 20 1618      Row Name 20 1330             ADL Assessment/Intervention    BADL Assessment/Intervention  grooming  -LOBO      Recorded by [LOBO] Rebecca Lipscomb OT 20 1618      Row Name 20 1330             Grooming Assessment/Training    [place] Level (Grooming)  wash face, hands;oral care  regimen;set up;supervision  -LOBO      Grooming Position  unsupported sitting  -LOBO      Comment (Grooming)  Assist required for setup 2/2 impaired functional use of LUE.  -LOBO      Recorded by [LOBO] Rebecca Lipscomb, OT 09/11/20 1618      Row Name 09/11/20 1333             BADL Safety/Performance    Impairments, BADL Safety/Performance  balance;endurance/activity tolerance;grasp/prehension;coordination;pain;range of motion;sensory awareness;strength;trunk/postural control  -LOBO      Cognitive Impairments, BADL Safety/Performance  attention;awareness, need for assistance;insight into deficits/self awareness  -LOBO      Skilled BADL Treatment/Intervention  BADL process/adaptation training  -LOBO      Progress in BADL Status  improvement noted  -LOBO      Recorded by [LOBO] Rebecca Lipscomb, OT 09/11/20 1618      Row Name 09/11/20 1330             Pain Scale: Numbers Pre/Post-Treatment    Pain Scale: Numbers, Pretreatment  0/10 - no pain  -LOBO      Pain Scale: Numbers, Post-Treatment  0/10 - no pain  -LOBO      Recorded by [LOBO] Rebecca Lipscomb, OT 09/11/20 1618      Row Name 09/11/20 1334             Sensory Assessment/Intervention    Sensory General Assessment  other (see comments) No sensation in LUE.  -LOBO      Recorded by [LOBO] Rebecca Lipscomb, OT 09/11/20 1618      Row Name 09/11/20 1336             Plan of Care Review    Plan of Care Reviewed With  patient  -LOBO      Progress  improving  -LOBO      Outcome Summary  Pt found supine with HOB elevated upon entry. Pt participated well with min-mod encouragement this date compared to previous sessions. Pt transferred supine to EOB with mod A x 2. Pt performed facial hygiene with supervision for thoroughness after setup. Pt performed oral hygiene with min A for setup 2/2 impaired functional use of LUE. Pt able to open/close ADL items using one hand technique after visual demonstration per OT. Pt tolerated 2 sit to stands with PT x 10 seconds each (non billed). Pt returned  to supine. Co-treatment completed due to high medical complexity and low endurance of patient, limiting tolerance of 2 separate treatment sessions. Pt is limited by acuity of illness, low endurance, impaired baseline LUE functional use and balance. Pt will benefit from skilled OT services 5x/week and discharge to inpatient rehab. PPE worn: [id number], face shield, bouffant, booties, gown, and gloves.  -LOBO      Recorded by [LOBO] Rebecca Lipscomb, OT 09/11/20 1618      Row Name 09/11/20 1330             Outcome Summary/Treatment Plan (OT)    Daily Summary of Progress (OT)  progress towards functional goals is fair  -LOBO      Anticipated Discharge Disposition (OT)  inpatient rehabilitation facility  -LOBO      Recorded by [LOBO] Rebecca Lipscomb, OT 09/11/20 1618        User Key  (r) = Recorded By, (t) = Taken By, (c) = Cosigned By    Initials Name Effective Dates Discipline    Rebecca Brito, OT 07/15/20 -  OT             Occupational Therapy Education                 Title: PT OT SLP Therapies (Done)     Topic: Occupational Therapy (Done)     Point: ADL training (Done)     Description:   Instruct learner(s) on proper safety adaptation and remediation techniques during self care or transfers.   Instruct in proper use of assistive devices.              Learning Progress Summary           Patient Acceptance, E,TB, VU,DU,NR by LOBO at 9/11/2020 1618    Acceptance, E,TB, VU,NR by SHAQ at 9/8/2020 1619    Acceptance, TB, DU by KI at 9/6/2020 0911    Acceptance, E, VU by DD at 9/6/2020 0454    Acceptance, TB, DU by KI at 9/5/2020 1217    Acceptance, TB, DU by KI at 9/5/2020 1215    Acceptance, E,TB,D, VU,DU,NR by  at 9/4/2020 0232    Acceptance, E,TB, VU,NR by SHAQ at 9/1/2020 1327    Acceptance, E, VU by SW at 8/29/2020 1025    Acceptance, E, VU by KS at 8/26/2020 2332    Acceptance, E,TB, VU by SS at 8/26/2020 0312    Acceptance, E,TB, VU by SHAQ at 8/25/2020 1702                   Point: Home exercise program (Done)      Description:   Instruct learner(s) on appropriate technique for monitoring, assisting and/or progressing therapeutic exercises/activities.              Learning Progress Summary           Patient Acceptance, E,TB, VU,DU,NR by LOBO at 9/11/2020 1618    Acceptance, TB, DU by KI at 9/6/2020 0911    Acceptance, E, VU by DD at 9/6/2020 0454    Acceptance, TB, DU by KI at 9/5/2020 1217    Acceptance, TB, DU by KI at 9/5/2020 1215    Acceptance, E,TB,D, VU,DU,NR by  at 9/4/2020 0232    Acceptance, E, VU by JOSE E at 8/29/2020 1025    Acceptance, E,D,TB, VU,NR by  at 8/28/2020 1553    Acceptance, E, VU by KS at 8/26/2020 2332    Acceptance, E,TB, VU by SS at 8/26/2020 0312                   Point: Precautions (Done)     Description:   Instruct learner(s) on prescribed precautions during self-care and functional transfers.              Learning Progress Summary           Patient Acceptance, E,TB, VU,DU,NR by LOBO at 9/11/2020 1618    Acceptance, E,TB, VU,NR by ES at 9/8/2020 1619    Acceptance, TB, DU by KI at 9/6/2020 0911    Acceptance, E, VU by DARRICK at 9/6/2020 0454    Acceptance, TB, DU by KI at 9/5/2020 1217    Acceptance, TB, DU by KI at 9/5/2020 1215    Acceptance, E,TB,D, VU,DU,NR by  at 9/4/2020 0232    Acceptance, E,TB, VU,NR by  at 9/1/2020 1327    Acceptance, E, VU by JOSE E at 8/29/2020 1025    Acceptance, E, VU by KS at 8/26/2020 2332    Acceptance, E,TB, VU by  at 8/26/2020 0312    Acceptance, E,TB, VU by  at 8/25/2020 1702                   Point: Body mechanics (Done)     Description:   Instruct learner(s) on proper positioning and spine alignment during self-care, functional mobility activities and/or exercises.              Learning Progress Summary           Patient Acceptance, E,TB, VU,DU,NR by LOBO at 9/11/2020 1618    Acceptance, E,TB, VU,NR by SHAQ at 9/8/2020 1619    Acceptance, TB, DU by KI at 9/6/2020 0911    Acceptance, E, VU by DARRICK at 9/6/2020 0454    Acceptance, TB, DU by KI at 9/5/2020 1217     Acceptance, TB, DU by KI at 9/5/2020 1215    Acceptance, E,TB,D, VU,DU,NR by  at 9/4/2020 0232    Acceptance, E,TB, VU,NR by ES at 9/1/2020 1327    Acceptance, E, VU by SW at 8/29/2020 1025    Acceptance, E,D,TB, VU,NR by  at 8/28/2020 1553    Acceptance, E, VU by KS at 8/26/2020 2332    Acceptance, E,TB, VU by SS at 8/26/2020 0312    Acceptance, E,TB, VU by ES at 8/25/2020 1702                               User Key     Initials Effective Dates Name Provider Type Discipline     03/01/19 -  Jessica Ansari, OT Occupational Therapist OT    ES 03/01/19 -  Irene Lara OT Occupational Therapist OT    KI 03/01/19 -  Tayler Camargo RN Registered Nurse Nurse     06/24/19 -  Juvencio Burton LPN Licensed Nurse Nurse    SW 06/24/19 -  Rita Jones LPN Licensed Nurse Nurse    DARRICK 06/24/19 -  Ranjana Ahuja LPN Licensed Nurse Nurse     06/24/19 -  Manuela Dorsey LPN Licensed Nurse Nurse    KS 03/01/19 -  Rizwana Yuan, RN Registered Nurse Nurse    LOBO 07/15/20 -  Rebecca Lipscomb OT Occupational Therapist OT                OT Recommendation and Plan  Outcome Summary/Treatment Plan (OT)  Daily Summary of Progress (OT): progress towards functional goals is fair  Anticipated Discharge Disposition (OT): inpatient rehabilitation facility  Daily Summary of Progress (OT): progress towards functional goals is fair  Plan of Care Review  Plan of Care Reviewed With: patient  Plan of Care Reviewed With: patient  Outcome Summary: Pt found supine with HOB elevated upon entry. Pt participated well with min-mod encouragement this date compared to previous sessions. Pt transferred supine to EOB with mod A x 2. Pt performed facial hygiene with supervision for thoroughness after setup. Pt performed oral hygiene with min A for setup 2/2 impaired functional use of LUE. Pt able to open/close ADL items using one hand technique after visual demonstration per OT. Pt tolerated 2 sit to stands with PT x 10  seconds each (non billed). Pt returned to supine. Co-treatment completed due to high medical complexity and low endurance of patient, limiting tolerance of 2 separate treatment sessions. Pt is limited by acuity of illness, low endurance, impaired baseline LUE functional use and balance. Pt will benefit from skilled OT services 5x/week and discharge to inpatient rehab. PPE worn: [id number], face shield, bouffant, booties, gown, and gloves.  Outcome Measures     Row Name 09/11/20 1330             How much help from another is currently needed...    Putting on and taking off regular lower body clothing?  2  -LOBO      Bathing (including washing, rinsing, and drying)  2  -LOBO      Toileting (which includes using toilet bed pan or urinal)  2  -LOBO      Putting on and taking off regular upper body clothing  2  -LOBO      Taking care of personal grooming (such as brushing teeth)  3  -LOBO      Eating meals  3  -LOBO      AM-PAC 6 Clicks Score (OT)  14  -LOBO         Functional Assessment    Outcome Measure Options  AM-PAC 6 Clicks Daily Activity (OT)  -LOBO        User Key  (r) = Recorded By, (t) = Taken By, (c) = Cosigned By    Initials Name Provider Type    Rebecca Briot OT Occupational Therapist           Time Calculation:   Time Calculation- OT     Row Name 09/11/20 1619             Time Calculation- OT    OT Start Time  1330  -LOBO      OT Stop Time  1400  -LOBO      OT Time Calculation (min)  30 min  -LOBO      Total Timed Code Minutes- OT  8 minute(s)  -LOBO      OT Received On  09/11/20  -LOBO      OT - Next Appointment  09/14/20  -        User Key  (r) = Recorded By, (t) = Taken By, (c) = Cosigned By    Initials Name Provider Type    Rebecca Brito OT Occupational Therapist        Therapy Charges for Today     Code Description Service Date Service Provider Modifiers Qty    29311231402 HC OT SELF CARE/MGMT/TRAIN EA 15 MIN 9/11/2020 Rebecca Lipscomb OT GO 1               Rebecca Lipscomb OT  9/11/2020

## 2020-09-11 NOTE — PROGRESS NOTES
PATIENT HAS LOST DISCHARGE APPEAL WITH [facility].     RECEIVED CALL FROM [facility] STATING THAT THE [facility] PHYSICIAN HAS REVIEWED THE CASE AND AGREES WITH TERMINATION OF SERVICES. THE ORIGINAL CALLER HAS BEEN NOTIFIED OF THE OUTCOME.  PATIENT LIABILITY IS DETERMINED TO BEGIN AT 12 NOON TOMORROW, 9/12/20.    [facility] :  [id number]  NIYA PHONE:  [phone]    LADARIUS RANDLE COORDINATOR  Care Coordination  [address]  [address]    Return Contact (Weekday):  Ph: [phone]  Fx: [phone]    Return Contact (Weekend):   Ph: [phone]  Fx: [phone]

## 2020-09-11 NOTE — PLAN OF CARE
Problem: Patient Care Overview  Goal: Plan of Care Review  Outcome: Ongoing (interventions implemented as appropriate)  Flowsheets (Taken 9/11/2020 1579)  Plan of Care Reviewed With: patient  Note:   Patient rested through shift without complaints. Awaiting rehab placement. Will continue to monitor.     Problem: Fall Risk (Adult)  Goal: Identify Related Risk Factors and Signs and Symptoms  Outcome: Ongoing (interventions implemented as appropriate)     Problem: Skin Injury Risk (Adult)  Goal: Identify Related Risk Factors and Signs and Symptoms  Outcome: Ongoing (interventions implemented as appropriate)     Problem: Skin Injury Risk (Adult)  Goal: Skin Health and Integrity  Outcome: Ongoing (interventions implemented as appropriate)

## 2020-09-11 NOTE — THERAPY TREATMENT NOTE
Patient Name: Ivan Maradiaga  : 1950    MRN: [id number]                              Today's Date: 2020       Admit Date: 2020    Visit Dx:     ICD-10-CM ICD-9-CM   1. Delirium R41.0 780.09   2. Pneumonia due to COVID-19 virus U07.1 480.8    J12.89    3. Weakness R53.1 780.79     Patient Active Problem List   Diagnosis   • Delirium   • Weakness   • Pneumonia due to COVID-19 virus     Past Medical History:   Diagnosis Date   • CAD (coronary artery disease)    • CHF (congestive heart failure) (CMS/[facility])    • Dementia (CMS/[facility])    • Hypertension      History reviewed. No pertinent surgical history.  General Information     [facility] Name 20 1623          PT Evaluation Time/Intention    Document Type  therapy note (daily note)  -     Mode of Treatment  physical therapy;co-treatment;occupational therapy  -[facility] Name 20 1623          General Information    Patient Profile Reviewed?  yes  -       User Key  (r) = Recorded By, (t) = Taken By, (c) = Cosigned By    Initials Name Provider Type    EL Bradley Daniel, PT Physical Therapist        Mobility     [facility] Name 20 1623          Bed Mobility Assessment/Treatment    Bed Mobility Assessment/Treatment  supine-sit  -EL     Supine-Sit [place] (Bed Mobility)  moderate assist (50% patient effort)  -     Sit-Supine [place] (Bed Mobility)  minimum assist (75% patient effort)  -EL     Row Name 20 1623          Transfer Assessment/Treatment    Comment (Transfers)  Pt came to standing 3x this treatment, requires rest between. Able to remain standing for 10 sec  -[facility] Name 20 1623          Bed-Chair Transfer    Bed-Chair [place] (Transfers)  not tested  -EL     Row Name 20 [facility]          Sit-Stand Transfer    Sit-Stand [place] (Transfers)  moderate assist (50% patient effort);maximum assist (25% patient effort);2 person assist  -EL     Assistive Device (Sit-Stand Transfers)  cane, quad  -CINTIA       User Key  (r) =  Recorded By, (t) = Taken By, (c) = Cosigned By    Initials Name Provider Type    Bradley Alaniz PT Physical Therapist        Obj/Interventions    No documentation.       Goals/Plan    No documentation.       Clinical Impression     Row Name 09/11/20 1624          Plan of Care Review    Plan of Care Reviewed With  patient  -EL     Progress  improving  -EL     Outcome Summary  Pt participated well for PT/OT treatment this date, requires MOD A for bed mobilty. Sitting on EOB pt was able to perform dynamic movements with vc for maintaining midline. Pt came to standing this date 3x. First attempt pt reports fear of falling and requested to be put back to bed. After reinforcement pt came to standing 2x for 10 seconds each with RUE support on quad cane. Pt continues to require IP rehab following d/c to return to functional baseline. PPE worn includes gloves, n95, surgical mask, gown, shoe covers, hair bonnet and face shield.   -EL     Row Name 09/11/20 1624          Physical Therapy Clinical Impression    Criteria for Skilled Interventions Met (PT Clinical Impression)  yes  -EL     Rehab Potential (PT Clinical Summary)  fair, will monitor progress closely  -EL     Row Name 09/11/20 1624          Vital Signs    O2 Delivery Pre Treatment  room air  -EL     O2 Delivery Intra Treatment  room air  -EL     O2 Delivery Post Treatment  room air  -EL     Pre Patient Position  Supine  -EL     Intra Patient Position  Standing  -EL     Post Patient Position  Supine  -EL     Row Name 09/11/20 1624          Positioning and Restraints    Pre-Treatment Position  in bed  -EL     Post Treatment Position  bed  -EL     In Bed  notified nsg;call light within reach;encouraged to call for assist;exit alarm on  -EL       User Key  (r) = Recorded By, (t) = Taken By, (c) = Cosigned By    Initials Name Provider Type    Bradley Alaniz PT Physical Therapist        Outcome Measures     Row Name 09/11/20 1628          How much help from another person  do you currently need...    Turning from your back to your side while in flat bed without using bedrails?  3  -EL     Moving from lying on back to sitting on the side of a flat bed without bedrails?  2  -EL     Moving to and from a bed to a chair (including a wheelchair)?  1  -EL     Standing up from a chair using your arms (e.g., wheelchair, bedside chair)?  2  -EL     Climbing 3-5 steps with a railing?  1  -EL     To walk in hospital room?  1  -EL     AM-PAC 6 Clicks Score (PT)  10  -EL     Row Name 09/11/20 1628          Modified [place] Scale    Pre-Stroke Modified [place] Scale  2 - Slight disability.  Unable to carry out all previous activities but able to look after own affairs without assistance.  -EL     Modified [place] Scale  5 - Severe disability.  Bedridden, incontinent, and requiring constant nursing care and attention.  -EL     Row Name 09/11/20 1628          Functional Assessment    Outcome Measure Options  AM-PAC 6 Clicks Basic Mobility (PT)  -       User Key  (r) = Recorded By, (t) = Taken By, (c) = Cosigned By    Initials Name Provider Type    Bradley Alaniz PT Physical Therapist        Physical Therapy Education                 Title: PT OT SLP Therapies (Done)     Topic: Physical Therapy (Done)     Point: Mobility training (Done)     Description:   Instruct learner(s) on safety and technique for assisting patient out of bed, chair or wheelchair.  Instruct in the proper use of assistive devices, such as walker, crutches, cane or brace.              Patient Friendly Description:   It's important to get you on your feet again, but we need to do so in a way that is safe for you. Falling has serious consequences, and your personal safety is the most important thing of all.        When it's time to get out of bed, one of us or a family member will sit next to you on the bed to give you support.     If your doctor or nurse tells you to use a walker, crutches, a cane, or a brace, be sure you use it every  time you get out of bed, even if you think you don't need it.    Learning Progress Summary           Patient Acceptance, E,TB, VU by EL at 9/11/2020 1628    Acceptance, E,TB, VU by EL at 9/10/2020 1609    Acceptance, E,TB, VU by EL at 9/8/2020 1811    Acceptance, TB, DU by KI at 9/6/2020 0911    Acceptance, E, VU by DD at 9/6/2020 0454    Acceptance, TB, DU by KI at 9/5/2020 1217    Acceptance, TB, DU by KI at 9/5/2020 1215    Acceptance, E,TB,D, VU,DU,NR by  at 9/4/2020 0232    Acceptance, E, NR by HC at 9/2/2020 1618    Acceptance, E, NR by HC at 8/31/2020 1602    Acceptance, E, VU by  at 8/29/2020 1025    Acceptance, E, NR by HC at 8/28/2020 1614    Acceptance, E,TB, VU by EL at 8/27/2020 1551    Acceptance, E, VU by KS at 8/26/2020 2332    Acceptance, E,TB, VU by  at 8/26/2020 0312    Acceptance, E,TB, VU by EL at 8/25/2020 1536    Acceptance, E,TB, VU,NR by  at 8/24/2020 1138                               User Key     Initials Effective Dates Name Provider Type Discipline     04/17/20 -  Corinne He, PT Physical Therapist PT    CM 03/01/19 -  Mirna Bear, PT Physical Therapist PT    EL 06/23/20 -  Bradley Daniel, PT Physical Therapist PT    KI 03/01/19 -  Tayler Camargo RN Registered Nurse Nurse     06/24/19 -  Juvencio Burton LPN Licensed Nurse Nurse    SW 06/24/19 -  Rita Jones LPN Licensed Nurse Nurse    DARRICK 06/24/19 -  Ranjana Ahuja LPN Licensed Nurse Nurse    SS 06/24/19 -  Manuela Dorsey LPN Licensed Nurse Nurse    KS 03/01/19 -  Rizwana Yuan RN Registered Nurse Nurse              PT Recommendation and Plan  Planned Therapy Interventions (PT Eval): balance training, neuromuscular re-education, patient/family education, transfer training, gait training, strengthening  Outcome Summary/Treatment Plan (PT)  Anticipated Discharge Disposition (PT): inpatient rehabilitation facility  Plan of Care Reviewed With: patient  Progress: improving  Outcome Summary: Pt  participated well for PT/OT treatment this date, requires MOD A for bed mobilty. Sitting on EOB pt was able to perform dynamic movements with vc for maintaining midline. Pt came to standing this date 3x. First attempt pt reports fear of falling and requested to be put back to bed. After reinforcement pt came to standing 2x for 10 seconds each with RUE support on quad cane. Pt continues to require IP rehab following d/c to return to functional baseline. PPE worn includes gloves, n95, surgical mask, gown, shoe covers, hair bonnet and face shield.      Time Calculation:   PT Charges     Row Name 09/11/20 1629             Time Calculation    Start Time  1330  -EL      Stop Time  1400  -EL      Time Calculation (min)  30 min  -EL      PT Received On  09/11/20  -EL      PT - Next Appointment  09/14/20  -EL        User Key  (r) = Recorded By, (t) = Taken By, (c) = Cosigned By    Initials Name Provider Type    Bradley Alaniz PT Physical Therapist        Therapy Charges for Today     Code Description Service Date Service Provider Modifiers Qty    50953919513 HC PT THERAPEUTIC ACT EA 15 MIN 9/10/2020 Bradley Daniel, PT GP 1    53917930416 HC PT NEUROMUSC RE EDUCATION EA 15 MIN 9/10/2020 Bradley Daniel, PT GP 1    91554267667 HC PT THERAPEUTIC ACT EA 15 MIN 9/11/2020 Bradley Daniel, PT GP 1    47632578071 HC PT NEUROMUSC RE EDUCATION EA 15 MIN 9/11/2020 Bradley Daniel, PT GP 1          PT G-Codes  Outcome Measure Options: AM-PAC 6 Clicks Basic Mobility (PT)  AM-PAC 6 Clicks Score (PT): 10  AM-PAC 6 Clicks Score (OT): 14  Modified [place] Scale: 5 - Severe disability.  Bedridden, incontinent, and requiring constant nursing care and attention.    Bradley Daniel PT  9/11/2020

## 2020-09-11 NOTE — PLAN OF CARE
Problem: Patient Care Overview  Goal: Plan of Care Review  Outcome: Ongoing (interventions implemented as appropriate)  Flowsheets (Taken 9/11/2020 3317)  Outcome Summary: Pt participated well for PT/OT treatment this date, requires MOD A for bed mobilty. Sitting on EOB pt was able to perform dynamic movements with vc for maintaining midline. Pt came to standing this date 3x. First attempt pt reports fear of falling and requested to be put back to bed. After reinforcement pt came to standing 2x for 10 seconds each with RUE support on quad cane. Pt continues to require IP rehab following d/c to return to functional baseline. PPE worn includes gloves, n95, surgical mask, gown, shoe covers, hair bonnet and face shield.

## 2020-09-11 NOTE — DISCHARGE SUMMARY
[facility] Medicine Services  DISCHARGE SUMMARY        Prepared For PCP:  Kelly Gill DO    Patient Name: Ivan Maradiaga  : 1950  MRN: [id number]      Date of Admission:   2020    Date of Discharge:  2020    Length of stay:  LOS: 18 days     Hospital Course     Presenting Problem:   Delirium [R41.0]  Pneumonia due to COVID-19 virus [U07.1, J12.89]  Delirium [R41.0]      Active Hospital Problems    Diagnosis  POA   • **Pneumonia due to COVID-19 virus [U07.1, J12.89]  Unknown   • Delirium [R41.0]  Yes   • Weakness [R53.1]  Yes      Resolved Hospital Problems   No resolved problems to display.     Acute COVID-19 pneumonia   -Patient retested and was still positive for COVID-19 on 2020-2020:p     Possible secondary bacterial pneumonia, resolved        -oxygenating well on room air          -completed IV Zosyn followed by Augmentin     Coronary disease, recent non-ST elevation MI         -on Amlodipine/Plavix/hydralazine/Crestor         -Cardiology saw the patient in consultation and the patient is felt to be stable             Essential hypertension, chronic and controlled         -Continue amlodipine /hydralazine      BPH       -Continue Proscar/Flomax     History of CVA with residual left-sided hemiparesis        -Continue Plavix/Crestor, and bedside range of motion as tolerated        Hospital Course:  Ivan Maradiaga is a 70 y.o. male with history of CAD, dementia, CVA with left-sided residual weakness ,? CHF and hypertension.  According to admission note and talking to his daughter--> he was recently treated at [facility] for STEMI and rhabdomyolysis in the setting of acute kidney injury after a mechanical fall.  He was subsequently discharged to rehab facility for continued physical therapy. Apparently, he was discharged from rehab on 2020.  He presented to [facility] ED with  AMS/encephalopathy.  He endorses  mild chest discomfort   shortness of breath with scant  productive cough.  No reported fever, chills, no night sweats, and no abdominal pain nausea or vomiting.  On arrival to [facility], he was screened and tested positive for COVID-19 and subsequently placed on isolation.  Chest x-ray revealed patchy peripheral airspace opacity concerning for bilateral multifocal pneumonia.      Review of record here at [facility] showed multiple testing here on 8/14 and 8/15 which were negative.  However, he had another testing done on 8/23/2020 which was positive.   Otherwise, he remained clinically stable and oxygenating well on room air. Laboratory showed static, nonzero slight evaded troponin and EKG showed sinus rhythm with RBBB.        He was evaluated by cardiologist and ID.  Patient does not require Remdisivir with stable oxygenation on RA.  He was  maintained on isolation and treated with IV antibiotic with Zosyn, and later switched to  Augmentin for COVID related pneumonia possible secondary bacterial infection.  The patient was felt to be stable for discharge however his daughter appealed to Medicare per nursing staff reporting that the patient's daughter wanted the patient's COVID test to be negative prior to discharge.     8/31/2020: The patient denies current complaints.  He is anxious to go home and is asking when his COVID test will be repeated.    9/1/2020: Patient denies current complaints except for feeling cold and asking for his blankets.  I spoke with the patient's daughter Maureen.  She is requesting repeat COVID testing so that if the patient is negative he will have more options for inpatient rehab since only one facility locally is taking COVID positive patients.  The COVID test was ordered resulted as positive.  Patient's daughter was notified.  9/2/2020: Patient denies c/o except that his bed is not comfortable.  He is anxious to go home.  He was counseled on the difficulties of sending him home when it is not  safe for him to be there alone.   9/3/2020: Patient reports feeling cold but otherwise denies current complaints.  9/4/2020: The patient denies current complaints.  9/5/2020: Patient reports no current complaints.  He reports that he is too weak to walk.  9/6/2020: Patient denies current complaints except for some mild postnasal drainage, generalized weakness, and the bed is uncomfortable.  COVID test was performed again on 9/6/2020 because if it is negative he should be able to discharge to a noncovered facility.  If it is positive, he has to stay here because his daughter refuses to let him go to a COVID positive facility and he is not safe to discharge to home.  His COVID test was positive again today.     9/7/20 no new complaints, denies SOA, cough or chest pain, awaiting placement, family appealed discharge  9/8/20-no new complaints, no events overnight VSS, DW with family.     9/9/20-no new complaints, PT recommending rehab. Awaiting placement, [facility] will not accept covid positive patients.      9/10/20 no new complaints, patient is ready for discharge,  Daughter appealing discharge for the second time, a discharge plan in place referal made, appeal to discharge was filled again by daughter on 9/9/20 rationale given is that the hospital did not provide a safe discharge plan.  It is the facility's standing that viable options for a safe discharge plan were provided in finding accepting skilled nursing facilities capable of providing the post acute care this patient needs. The patient's daughter declines offered facilities and states they are unacceptable.        9/11/20- no new complaints.  [facility] accepting attending, JF  and RN, Jf with daughter. Notified the discharge has been upheld by NIYA. The pt has until noon tomorrow 9/12/20 to discharge or will start incurring llabilitiy. [facility]      Day of Discharge     Vital Signs:   Temp:  [97.2 °F (36.2 °C)-98 °F (36.7 °C)] 97.7 °F (36.5 °C)  Heart Rate:  [62-81] 71  Resp:  [16-19] 18  BP: (107-155)/(67-81) 107/67       Physical Exam   Constitutional: He is oriented to person, place, and time. He appears well-developed and well-nourished. No distress.   HENT:   Head: Normocephalic and atraumatic.   Mouth/Throat: No oropharyngeal exudate.   Eyes: Pupils are equal, round, and reactive to light. Conjunctivae and EOM are normal. Right eye exhibits no discharge.   Neck: Normal range of motion. No thyromegaly present.   Cardiovascular: Normal rate, regular rhythm and normal heart sounds.   Pulmonary/Chest: Effort normal and breath sounds normal.   Abdominal: Soft. Bowel sounds are normal. He exhibits no distension.   Musculoskeletal: Normal range of motion. He exhibits no edema.   Neurological: He is alert and oriented to person, place, and time. He displays abnormal reflex. A sensory deficit is present. No cranial nerve deficit. He exhibits abnormal muscle tone. Coordination abnormal.   LEFT SIDED PARESIS    Skin: Skin is warm and dry. He is not diaphoretic. No erythema.   Psychiatric: He has a normal mood and affect. His behavior is normal.   Nursing note and vitals reviewed.      Pertinent  and/or Most Recent Results               Invalid input(s): PROT, LABALBU        Invalid input(s): TG, LDLCALC, LDLREALC        Brief Urine Lab Results  (Last result in the past 365 days)      Color   Clarity   Blood   Leuk Est   Nitrite   Protein   CREAT   Urine HCG        08/23/20 0344 Yellow Clear Negative Small (1+) Negative 30 mg/dL (1+)               Microbiology Results Abnormal     Procedure Component Value - Date/Time    Respiratory Panel PCR w/COVID-19(SARS-CoV-2) MAIRA/NANDINI/ANA/PAD/COR/MAD In-House, NP Swab in UTM/VTM, 3-4 HR TAT - Swab, Nasopharynx [[id number]]  (Abnormal) Collected:  09/06/20 1632    Lab Status:  Final result Specimen:  Swab from  Nasopharynx Updated:  09/06/20 1832     ADENOVIRUS, PCR Not Detected     Coronavirus 229E Not Detected     Coronavirus HKU1 Not Detected     Coronavirus NL63 Not Detected     Coronavirus OC43 Not Detected     COVID19 Detected     Human Metapneumovirus Not Detected     Human Rhinovirus/Enterovirus Not Detected     Influenza A PCR Not Detected     Influenza A H1 Not Detected     Influenza A H1 2009 PCR Not Detected     Influenza A H3 Not Detected     Influenza B PCR Not Detected     Parainfluenza Virus 1 Not Detected     Parainfluenza Virus 2 Not Detected     Parainfluenza Virus 3 Not Detected     Parainfluenza Virus 4 Not Detected     RSV, PCR Not Detected     Bordetella pertussis pcr Not Detected     Bordetella parapertussis PCR Not Detected     Chlamydophila pneumoniae PCR Not Detected     Mycoplasma pneumo by PCR Not Detected    Narrative:       Fact sheet for providers: https://docs.[employer]/wp-content/uploads/MHR3774-7256-GH8.1-EUA-Provider-Fact-Sheet-3.pdf    Fact sheet for patients: https://docs.[employer]/wp-content/uploads/AQI1720-3149-XY5.1-EUA-Patient-Fact-Sheet-1.pdf    Respiratory Panel PCR w/COVID-19(SARS-CoV-2) MAIRA/NANDINI/ANA/PAD/COR/MAD In-House, NP Swab in UTM/VTM, 3-4 HR TAT - Swab, Nasopharynx [[id number]]  (Abnormal) Collected:  09/01/20 1424    Lab Status:  Final result Specimen:  Swab from Nasopharynx Updated:  09/01/20 1558     ADENOVIRUS, PCR Not Detected     Coronavirus 229E Not Detected     Coronavirus HKU1 Not Detected     Coronavirus NL63 Not Detected     Coronavirus OC43 Not Detected     COVID19 Detected     Human Metapneumovirus Not Detected     Human Rhinovirus/Enterovirus Not Detected     Influenza A PCR Not Detected     Influenza A H1 Not Detected     Influenza A H1 2009 PCR Not Detected     Influenza A H3 Not Detected     Influenza B PCR Not Detected     Parainfluenza Virus 1 Not Detected     Parainfluenza Virus 2 Not Detected     Parainfluenza Virus 3 Not Detected      Parainfluenza Virus 4 Not Detected     RSV, PCR Not Detected     Bordetella pertussis pcr Not Detected     Bordetella parapertussis PCR Not Detected     Chlamydophila pneumoniae PCR Not Detected     Mycoplasma pneumo by PCR Not Detected    Narrative:       Fact sheet for providers: https://docs.[employer]/wp-content/uploads/DQY3626-6912-RB1.1-EUA-Provider-Fact-Sheet-3.pdf    Fact sheet for patients: https://docs.[employer]/wp-content/uploads/ROO0247-6041-CO8.1-EUA-Patient-Fact-Sheet-1.pdf    Blood Culture - Blood, Arm, Right [[id number]] Collected:  08/23/20 0343    Lab Status:  Final result Specimen:  Blood from Arm, Right Updated:  08/28/20 0400     Blood Culture No growth at 5 days    Blood Culture - Blood, Arm, Left [[id number]] Collected:  08/23/20 0343    Lab Status:  Final result Specimen:  Blood from Arm, Left Updated:  08/28/20 0400     Blood Culture No growth at 5 days    COVID PRE-OP / PRE-PROCEDURE SCREENING ORDER (NO ISOLATION) - Swab, Nasopharynx [[id number]] Collected:  08/23/20 1256    Lab Status:  Edited Result - FINAL Specimen:  Swab from Nasopharynx Updated:  08/25/20 1334    Narrative:       The following orders were created for panel order COVID PRE-OP / PRE-PROCEDURE SCREENING ORDER (NO ISOLATION) - Swab, Nasopharynx.  Procedure                               Abnormality         Status                     ---------                               -----------         ------                     Respiratory Panel PCR w/...[[id number]]  Abnormal            Edited Result - FINAL        Please view results for these tests on the individual orders.    Respiratory Panel PCR w/COVID-19(SARS-CoV-2) MAIRA/NANDINI/ANA/PAD In-House, NP Swab in UTM/VTM, 3-4 HR TAT - Swab, Nasopharynx [[id number]]  (Abnormal) Collected:  08/23/20 1256    Lab Status:  Edited Result - FINAL Specimen:  Swab from Nasopharynx Updated:  08/25/20 9064     ADENOVIRUS, PCR Not Detected     Coronavirus 229E Not Detected     Coronavirus HKU1  Not Detected     Coronavirus NL63 Not Detected     Coronavirus OC43 Not Detected     COVID19 Detected     Human Metapneumovirus Not Detected     Human Rhinovirus/Enterovirus Not Detected     Influenza A PCR Not Detected     Influenza A H1 Not Detected     Influenza A H1 2009 PCR Not Detected     Influenza A H3 Not Detected     Influenza B PCR Not Detected     Parainfluenza Virus 1 Not Detected     Parainfluenza Virus 2 Not Detected     Parainfluenza Virus 3 Not Detected     Parainfluenza Virus 4 Not Detected     RSV, PCR Not Detected     Bordetella pertussis pcr Not Detected     Bordetella parapertussis PCR Not Detected     Chlamydophila pneumoniae PCR Not Detected     Mycoplasma pneumo by PCR Not Detected    Narrative:       Fact sheet for providers: https://docs.[employer]/wp-content/uploads/RFK7655-2736-GC5.1-EUA-Provider-Fact-Sheet-3.pdf    Fact sheet for patients: https://docs.[employer]/wp-content/uploads/SSS1746-1369-LT2.1-EUA-Patient-Fact-Sheet-1.pdf    Respiratory Panel PCR w/COVID-19(SARS-CoV-2) MAIRA/NANDINI/ANA/PAD In-House, NP Swab in UTM/VTM, 3-4 HR TAT - Swab, Nasopharynx [[id number]]  (Abnormal) Collected:  08/24/20 2054    Lab Status:  Final result Specimen:  Swab from Nasopharynx Updated:  08/24/20 1881     ADENOVIRUS, PCR Not Detected     Coronavirus 229E Not Detected     Coronavirus HKU1 Not Detected     Coronavirus NL63 Not Detected     Coronavirus OC43 Not Detected     COVID19 Detected     Human Metapneumovirus Not Detected     Human Rhinovirus/Enterovirus Not Detected     Influenza A PCR Not Detected     Influenza A H1 Not Detected     Influenza A H1 2009 PCR Not Detected     Influenza A H3 Not Detected     Influenza B PCR Not Detected     Parainfluenza Virus 1 Not Detected     Parainfluenza Virus 2 Not Detected     Parainfluenza Virus 3 Not Detected     Parainfluenza Virus 4 Not Detected     RSV, PCR Not Detected     Bordetella pertussis pcr Not Detected     Bordetella parapertussis PCR Not  Detected     Chlamydophila pneumoniae PCR Not Detected     Mycoplasma pneumo by PCR Not Detected    Narrative:       Fact sheet for providers: https://docs.[employer]/wp-content/uploads/FZZ1801-3006-SK3.1-EUA-Provider-Fact-Sheet-3.pdf    Fact sheet for patients: https://docs.[employer]/wp-content/uploads/QXE8899-6830-NE5.1-EUA-Patient-Fact-Sheet-1.pdf    Urine Culture - Urine, Urine, Catheter In/Out [[id number]]  (Abnormal) Collected:  08/23/20 0344    Lab Status:  Final result Specimen:  Urine, Catheter In/Out Updated:  08/24/20 1702     Urine Culture >100,000 CFU/mL Staphylococcus, coagulase negative     Comment:   Call if work-up needed.             Xr Chest 2 View    Result Date: 8/14/2020  Impression: No acute chest findings.  Electronically Signed By-Dr. Alexus Mejias MD On:8/14/2020 4:26 PM This report was finalized on [id number] by Dr. Alexus Mejias MD.    Xr Chest 1 View    Result Date: 8/28/2020  Impression: The lungs consistent with multifocal pneumonia, without significant change from 08/25/2020.  Electronically Signed By-Dr. Alexus Mejias MD On:8/28/2020 11:03 AM This report was finalized on [id number] by Dr. Alexus Mejias MD.    Xr Chest 1 View    Result Date: 8/25/2020  Impression:  1. Persistent multifocal areas of interstitial pneumonia is noted without significant change from prior exam.   Electronically Signed By-Chago Mckeon On:8/25/2020 8:03 AM This report was finalized on [id number] by  Chago Mckeon .    Xr Chest 1 View    Result Date: 8/23/2020  Impression: Patchy peripheral airspace opacities concerning for bilateral multifocal pneumonia. Senescent changes in the thoracic aorta.   Electronically Signed By-Arslan Rock DO. On:8/23/2020 9:42 AM This report was finalized on [id number] by  Arslan Rock DO..                             Test Results Pending at Discharge        Procedures Performed           Consults:   Consults     Date and Time Order Name Status  Description    8/24/2020 1054 Inpatient Cardiology Consult Completed     8/24/2020 0840 Inpatient Infectious Diseases Consult              Discharge Details        Discharge Medications      Changes to Medications      Instructions Start Date   melatonin 5 MG tablet tablet  What changed:  Another medication with the same name was removed. Continue taking this medication, and follow the directions you see here.   3 mg, Oral, Nightly PRN         Continue These Medications      Instructions Start Date   acetaminophen 325 MG tablet  Commonly known as:  TYLENOL   650 mg, Oral, Every 6 Hours PRN      amLODIPine 10 MG tablet  Commonly known as:  NORVASC   10 mg, Oral, Daily      baclofen 20 MG tablet  Commonly known as:  LIORESAL   20 mg, Oral, 2 Times Daily      calcium carbonate 500 MG chewable tablet  Commonly known as:  TUMS   1 tablet, Oral, Daily PRN      cetirizine 5 MG tablet  Commonly known as:  zyrTEC   5 mg, Oral, Daily      clopidogrel 75 MG tablet  Commonly known as:  PLAVIX   75 mg, Oral, Daily      dry mouth gel gel   Mouth/Throat, 4 Times Daily PRN      finasteride 5 MG tablet  Commonly known as:  PROSCAR   5 mg, Oral, Daily      fluticasone 50 MCG/ACT nasal spray  Commonly known as:  FLONASE   2 sprays, Nasal, Daily      guaiFENesin 600 MG 12 hr tablet  Commonly known as:  MUCINEX   1,200 mg, Oral, 2 Times Daily      guaiFENesin 100 MG/5ML solution oral solution  Commonly known as:  ROBITUSSIN   200 mg, Oral, Every 4 Hours PRN      hydrALAZINE 50 MG tablet  Commonly known as:  APRESOLINE   50 mg, Oral, 2 Times Daily      hydrocortisone 1 % cream   Topical, 2 Times Daily      levETIRAcetam 500 MG tablet  Commonly known as:  KEPPRA   500 mg, Oral, 2 Times Daily      lisinopril 40 MG tablet  Commonly known as:  PRINIVIL,ZESTRIL   40 mg, Oral, Daily      multivitamin with minerals tablet tablet   1 tablet, Oral, Daily      nystatin 436272 UNIT/GM cream  Commonly known as:  MYCOSTATIN   100,000 g, Topical, 2  Times Daily      oxybutynin 5 MG tablet  Commonly known as:  DITROPAN   15 mg, Oral, Daily      potassium chloride 20 MEQ CR tablet  Commonly known as:  K-DUR,KLOR-CON   20 mEq, Oral, Daily      rosuvastatin 20 MG tablet  Commonly known as:  CRESTOR   20 mg, Oral, Daily      sodium chloride 0.65 % nasal spray   2 sprays, Nasal, As Needed      tamsulosin 0.4 MG capsule 24 hr capsule  Commonly known as:  FLOMAX   2 capsules, Oral, Daily      traZODone 50 MG tablet  Commonly known as:  DESYREL   50 mg, Oral, Nightly      zinc oxide 20 % ointment   Topical, As Needed         ASK your doctor about these medications      Instructions Start Date   amoxicillin-clavulanate 875-125 MG per tablet  Commonly known as:  AUGMENTIN  Ask about: Should I take this medication?   1 tablet, Oral, 2 Times Daily With Meals             Allergies   Allergen Reactions   • Sulfa Antibiotics Unknown - High Severity         Discharge Disposition:  Home-Health Care Svc    Diet:  Hospital:  Diet Order   Procedures   • Diet Cardiac; Healthy Heart         Discharge Activity:         CODE STATUS:    Code Status and Medical Interventions:   Ordered at: 08/23/20 0559     Code Status:    CPR     Medical Interventions (Level of Support Prior to Arrest):    Full         Follow-up Appointments  No future appointments.    Additional Instructions for the Follow-ups that You Need to Schedule     Ambulatory Referral to Home Health   As directed      Face to Face Visit Date:  8/27/2020    Follow-up provider for Plan of Care?:  I treated the patient in an acute care facility and will not continue treatment after discharge.    Follow-up provider:  SHERRILL HOLLINS [[id number]]    Reason/Clinical Findings:  pneumonia due to Covid 19    Describe mobility limitations that make leaving home difficult:  weakness    Nursing/Therapeutic Services Requested:  Skilled Nursing (HHC to eval and treat)    Skilled nursing orders:  Other (HHC to eval and treat)    Frequency:  1 Week  1                 Condition on Discharge:      Stable      This patient has been examined wearing appropriate Personal Protective Equipment       Electronically signed by Tone Dia MD, 09/11/20, 1:40 PM.      Time: I spent  40  minutes on this discharge activity which included face-to-face encounter with the patient/reviewing the data in the system/coordination of the care with the nursing staff as well as consultants/documentation/entering orders.

## 2020-09-11 NOTE — PLAN OF CARE
Problem: Patient Care Overview  Goal: Plan of Care Review  Outcome: Ongoing (interventions implemented as appropriate)  Flowsheets  Taken 9/11/2020 1618  Plan of Care Reviewed With: patient  Taken 9/11/2020 1330  Outcome Summary: Pt found supine with HOB elevated upon entry. Pt participated well with min-mod encouragement this date compared to previous sessions. Pt transferred supine to EOB with mod A x 2. Pt performed facial hygiene with supervision for thoroughness after setup. Pt performed oral hygiene with min A for setup 2/2 impaired functional use of LUE. Pt able to open/close ADL items using one hand technique after visual demonstration per OT. Pt tolerated 2 sit to stands with PT x 10 seconds each (non billed). Pt returned to supine. Co-treatment completed due to high medical complexity and low endurance of patient, limiting tolerance of 2 separate treatment sessions. Pt is limited by acuity of illness, low endurance, impaired baseline LUE functional use and balance. Pt will benefit from skilled OT services 5x/week and discharge to inpatient rehab. PPE worn: [id number], face shield, bouffant, booties, gown, and gloves.

## 2020-09-11 NOTE — PROGRESS NOTES
Continued Stay Note   Chon     Patient Name: Ivan Maradiaga  MRN: [id number]  Today's Date: 9/11/2020    Admit Date: 8/23/2020    Discharge Plan     Row Name 09/11/20 6083       Plan    Plan  [facility] - pt will go to [address] [place] [address], Bed #6 . Call report to [phone].    Row Name 09/11/20 1310       Plan    Plan  DC Plan: Pt has been accepted per [facility]. Will call room # when available. to CM or to PEDS for report. Will need transporation arranged.    Row Name 09/11/20 1254       Plan    Plan  DC Plan: Notified the discharge has been upheld by NIYA. The pt has until noon tomorrow 9/12/20 to discharge or will start incurring llabilitiy. [facility]        Discharge Codes    No documentation.       Expected Discharge Date and Time     Expected Discharge Date Expected Discharge Time    Aug 28, 2020             Karuna Garza RN

## 2020-09-11 NOTE — NURSING NOTE
"pts daughter called. Requested to talk to MD. Daughter states \"[facility].\" MD notified. Will continue to monitor.   "

## 2020-09-11 NOTE — NURSING NOTE
[facility] Report given to Jt. Nurse requests we leave IV in. IV left in RFA. Will continue to monitor.

## 2020-09-11 NOTE — PROGRESS NOTES
Continued Stay Note  MITCH Chon     Patient Name: Ivan Maradiaga  MRN: [id number]  Today's Date: 9/11/2020    Admit Date: 8/23/2020    Discharge Plan     Row Name 09/11/20 1256       Plan    Plan  DC Plan: Notified the discharge has been upheld by NIYA. The pt has until noon tomorrow 9/12/20 to discharge or will start incurring llabilitiy. [facility]               Expected Discharge Date and Time     Expected Discharge Date Expected Discharge Time    Aug 28, 2020             Karuna Garza RN

## 2020-09-13 NOTE — PROGRESS NOTES
Continued Stay Note  MITCH Givens     Patient Name: Ivan Maradiaga  MRN: [id number]  Today's Date: 9/13/2020    Admit Date: 8/23/2020    Discharge Plan     Row Name 09/13/20 1700       Plan    Final Discharge Disposition Code  43 - [facility] [place] 2]          Expected Discharge Date and Time     Expected Discharge Date Expected Discharge Time    Sep 11, 2020             Nishi Terrazas RN

## 2022-01-01 ENCOUNTER — APPOINTMENT (OUTPATIENT)
Dept: CT IMAGING | Facility: HOSPITAL | Age: 72
End: 2022-01-01

## 2022-01-01 ENCOUNTER — APPOINTMENT (OUTPATIENT)
Dept: CARDIOLOGY | Facility: HOSPITAL | Age: 72
End: 2022-01-01

## 2022-01-01 ENCOUNTER — APPOINTMENT (OUTPATIENT)
Dept: ULTRASOUND IMAGING | Facility: HOSPITAL | Age: 72
End: 2022-01-01

## 2022-01-01 ENCOUNTER — APPOINTMENT (OUTPATIENT)
Dept: GENERAL RADIOLOGY | Facility: HOSPITAL | Age: 72
End: 2022-01-01

## 2022-01-01 ENCOUNTER — HOSPITAL ENCOUNTER (INPATIENT)
Facility: HOSPITAL | Age: 72
LOS: 1 days | End: 2022-10-24
Attending: EMERGENCY MEDICINE | Admitting: INTERNAL MEDICINE

## 2022-01-01 VITALS
DIASTOLIC BLOOD PRESSURE: 50 MMHG | HEART RATE: 120 BPM | TEMPERATURE: 98.7 F | RESPIRATION RATE: 28 BRPM | OXYGEN SATURATION: 91 % | SYSTOLIC BLOOD PRESSURE: 82 MMHG | HEIGHT: 74 IN | BODY MASS INDEX: 35.2 KG/M2 | WEIGHT: 274.25 LBS

## 2022-01-01 DIAGNOSIS — J96.01 ACUTE RESPIRATORY FAILURE WITH HYPOXIA: ICD-10-CM

## 2022-01-01 DIAGNOSIS — R65.21 SEPTIC SHOCK: ICD-10-CM

## 2022-01-01 DIAGNOSIS — A41.9 SEPTIC SHOCK: ICD-10-CM

## 2022-01-01 DIAGNOSIS — J96.01 ACUTE HYPOXEMIC RESPIRATORY FAILURE: Primary | ICD-10-CM

## 2022-01-01 DIAGNOSIS — J18.9 PNEUMONIA DUE TO INFECTIOUS ORGANISM, UNSPECIFIED LATERALITY, UNSPECIFIED PART OF LUNG: ICD-10-CM

## 2022-01-01 DIAGNOSIS — I21.4 NSTEMI (NON-ST ELEVATED MYOCARDIAL INFARCTION): ICD-10-CM

## 2022-01-01 LAB
ACT BLD: 161 SECONDS (ref 89–137)
ALBUMIN SERPL-MCNC: 3 G/DL (ref 3.5–5.2)
ALBUMIN SERPL-MCNC: 3.3 G/DL (ref 3.5–5.2)
ALBUMIN/GLOB SERPL: 0.9 G/DL
ALBUMIN/GLOB SERPL: 1.1 G/DL
ALP SERPL-CCNC: 61 U/L (ref 39–117)
ALP SERPL-CCNC: 69 U/L (ref 39–117)
ALT SERPL W P-5'-P-CCNC: 25 U/L (ref 1–41)
ALT SERPL W P-5'-P-CCNC: 46 U/L (ref 1–41)
ANION GAP SERPL CALCULATED.3IONS-SCNC: 10 MMOL/L (ref 5–15)
ANION GAP SERPL CALCULATED.3IONS-SCNC: 16 MMOL/L (ref 5–15)
APTT PPP: 27.7 SECONDS (ref 61–76.5)
APTT PPP: 58.4 SECONDS (ref 61–76.5)
APTT PPP: 77.7 SECONDS (ref 61–76.5)
APTT PPP: 78.6 SECONDS (ref 61–76.5)
ARTERIAL PATENCY WRIST A: POSITIVE
AST SERPL-CCNC: 134 U/L (ref 1–40)
AST SERPL-CCNC: 347 U/L (ref 1–40)
ATMOSPHERIC PRESS: ABNORMAL MM[HG]
ATMOSPHERIC PRESS: ABNORMAL MM[HG]
B PARAPERT DNA SPEC QL NAA+PROBE: NOT DETECTED
B PERT DNA SPEC QL NAA+PROBE: NOT DETECTED
BACTERIA UR QL AUTO: ABNORMAL /HPF
BASE EXCESS BLDA CALC-SCNC: -2.3 MMOL/L (ref 0–3)
BASE EXCESS BLDV CALC-SCNC: 0 MMOL/L (ref -2–2)
BASOPHILS # BLD AUTO: 0.1 10*3/MM3 (ref 0–0.2)
BASOPHILS # BLD AUTO: 0.1 10*3/MM3 (ref 0–0.2)
BASOPHILS NFR BLD AUTO: 0.3 % (ref 0–1.5)
BASOPHILS NFR BLD AUTO: 0.4 % (ref 0–1.5)
BDY SITE: ABNORMAL
BDY SITE: ABNORMAL
BH CV ECHO MEAS - AO MAX PG: 22.8 MMHG
BH CV ECHO MEAS - AO MEAN PG: 16.2 MMHG
BH CV ECHO MEAS - AO ROOT DIAM: 3.6 CM
BH CV ECHO MEAS - AO V2 MAX: 238.6 CM/SEC
BH CV ECHO MEAS - AO V2 VTI: 48.5 CM
BH CV ECHO MEAS - AVA(I,D): 0.79 CM2
BH CV ECHO MEAS - EDV(CUBED): 123.2 ML
BH CV ECHO MEAS - ESV(CUBED): 99.1 ML
BH CV ECHO MEAS - FS: 7 %
BH CV ECHO MEAS - IVS/LVPW: 1.07 CM
BH CV ECHO MEAS - IVSD: 1.38 CM
BH CV ECHO MEAS - LA DIMENSION: 4.4 CM
BH CV ECHO MEAS - LV MASS(C)D: 269.6 GRAMS
BH CV ECHO MEAS - LV MAX PG: 2.37 MMHG
BH CV ECHO MEAS - LV MEAN PG: 1.51 MMHG
BH CV ECHO MEAS - LV V1 MAX: 76.9 CM/SEC
BH CV ECHO MEAS - LV V1 VTI: 15.5 CM
BH CV ECHO MEAS - LVIDD: 5 CM
BH CV ECHO MEAS - LVIDS: 4.6 CM
BH CV ECHO MEAS - LVOT AREA: 2.47 CM2
BH CV ECHO MEAS - LVOT DIAM: 1.77 CM
BH CV ECHO MEAS - LVPWD: 1.29 CM
BH CV ECHO MEAS - MV A MAX VEL: 94.4 CM/SEC
BH CV ECHO MEAS - MV DEC SLOPE: 690.4 CM/SEC2
BH CV ECHO MEAS - MV DEC TIME: 0.14 MSEC
BH CV ECHO MEAS - MV E MAX VEL: 99.4 CM/SEC
BH CV ECHO MEAS - MV E/A: 1.05
BH CV ECHO MEAS - MV MAX PG: 7.8 MMHG
BH CV ECHO MEAS - MV MEAN PG: 2.14 MMHG
BH CV ECHO MEAS - MV V2 VTI: 38.8 CM
BH CV ECHO MEAS - MVA(VTI): 0.99 CM2
BH CV ECHO MEAS - PA V2 MAX: 92.2 CM/SEC
BH CV ECHO MEAS - RV MAX PG: 2.6 MMHG
BH CV ECHO MEAS - RV V1 MAX: 81.3 CM/SEC
BH CV ECHO MEAS - RV V1 VTI: 16.3 CM
BH CV ECHO MEAS - RVDD: 3.8 CM
BH CV ECHO MEAS - SV(LVOT): 38.2 ML
BILIRUB SERPL-MCNC: 0.6 MG/DL (ref 0–1.2)
BILIRUB SERPL-MCNC: 0.6 MG/DL (ref 0–1.2)
BILIRUB UR QL STRIP: NEGATIVE
BUN SERPL-MCNC: 22 MG/DL (ref 8–23)
BUN SERPL-MCNC: 32 MG/DL (ref 8–23)
BUN/CREAT SERPL: 13.7 (ref 7–25)
BUN/CREAT SERPL: 18 (ref 7–25)
C PNEUM DNA NPH QL NAA+NON-PROBE: NOT DETECTED
CALCIUM SPEC-SCNC: 8.2 MG/DL (ref 8.6–10.5)
CALCIUM SPEC-SCNC: 8.3 MG/DL (ref 8.6–10.5)
CHLORIDE SERPL-SCNC: 106 MMOL/L (ref 98–107)
CHLORIDE SERPL-SCNC: 107 MMOL/L (ref 98–107)
CLARITY UR: CLEAR
CO2 BLDA-SCNC: 22.4 MMOL/L (ref 22–29)
CO2 BLDA-SCNC: 27 MMOL/L (ref 22–29)
CO2 SERPL-SCNC: 21 MMOL/L (ref 22–29)
CO2 SERPL-SCNC: 25 MMOL/L (ref 22–29)
COLOR UR: ABNORMAL
CREAT SERPL-MCNC: 1.61 MG/DL (ref 0.76–1.27)
CREAT SERPL-MCNC: 1.78 MG/DL (ref 0.76–1.27)
CRP SERPL-MCNC: 7.36 MG/DL (ref 0–0.5)
D DIMER PPP FEU-MCNC: 1.8 MG/L (FEU) (ref 0–0.59)
D-LACTATE SERPL-SCNC: 1.8 MMOL/L (ref 0.5–2)
DEPRECATED RDW RBC AUTO: 51.6 FL (ref 37–54)
DEPRECATED RDW RBC AUTO: 52.9 FL (ref 37–54)
EGFRCR SERPLBLD CKD-EPI 2021: 39.8 ML/MIN/1.73
EGFRCR SERPLBLD CKD-EPI 2021: 44.9 ML/MIN/1.73
EOSINOPHIL # BLD AUTO: 0 10*3/MM3 (ref 0–0.4)
EOSINOPHIL # BLD AUTO: 0 10*3/MM3 (ref 0–0.4)
EOSINOPHIL NFR BLD AUTO: 0 % (ref 0.3–6.2)
EOSINOPHIL NFR BLD AUTO: 0 % (ref 0.3–6.2)
ERYTHROCYTE [DISTWIDTH] IN BLOOD BY AUTOMATED COUNT: 15.5 % (ref 12.3–15.4)
ERYTHROCYTE [DISTWIDTH] IN BLOOD BY AUTOMATED COUNT: 15.7 % (ref 12.3–15.4)
FLUAV SUBTYP SPEC NAA+PROBE: NOT DETECTED
FLUBV RNA ISLT QL NAA+PROBE: NOT DETECTED
GLOBULIN UR ELPH-MCNC: 3 GM/DL
GLOBULIN UR ELPH-MCNC: 3.4 GM/DL
GLUCOSE BLDC GLUCOMTR-MCNC: 114 MG/DL (ref 70–105)
GLUCOSE SERPL-MCNC: 126 MG/DL (ref 65–99)
GLUCOSE SERPL-MCNC: 147 MG/DL (ref 65–99)
GLUCOSE UR STRIP-MCNC: NEGATIVE MG/DL
HADV DNA SPEC NAA+PROBE: NOT DETECTED
HCO3 BLDA-SCNC: 21.4 MMOL/L (ref 21–28)
HCO3 BLDV-SCNC: 25.6 MMOL/L (ref 22–26)
HCOV 229E RNA SPEC QL NAA+PROBE: NOT DETECTED
HCOV HKU1 RNA SPEC QL NAA+PROBE: NOT DETECTED
HCOV NL63 RNA SPEC QL NAA+PROBE: NOT DETECTED
HCOV OC43 RNA SPEC QL NAA+PROBE: NOT DETECTED
HCT VFR BLD AUTO: 32.7 % (ref 37.5–51)
HCT VFR BLD AUTO: 34.3 % (ref 37.5–51)
HEMODILUTION: NO
HGB BLD-MCNC: 10.3 G/DL (ref 13–17.7)
HGB BLD-MCNC: 11.1 G/DL (ref 13–17.7)
HGB UR QL STRIP.AUTO: NEGATIVE
HMPV RNA NPH QL NAA+NON-PROBE: NOT DETECTED
HOLD SPECIMEN: NORMAL
HOLD SPECIMEN: NORMAL
HPIV1 RNA ISLT QL NAA+PROBE: NOT DETECTED
HPIV2 RNA SPEC QL NAA+PROBE: NOT DETECTED
HPIV3 RNA NPH QL NAA+PROBE: NOT DETECTED
HPIV4 P GENE NPH QL NAA+PROBE: NOT DETECTED
HYALINE CASTS UR QL AUTO: ABNORMAL /LPF
INHALED O2 CONCENTRATION: 44 %
INHALED O2 CONCENTRATION: 80 %
INR PPP: 1.07 (ref 0.93–1.1)
KETONES UR QL STRIP: ABNORMAL
LEUKOCYTE ESTERASE UR QL STRIP.AUTO: ABNORMAL
LV EF 2D ECHO EST: 37 %
LYMPHOCYTES # BLD AUTO: 1.6 10*3/MM3 (ref 0.7–3.1)
LYMPHOCYTES # BLD AUTO: 1.9 10*3/MM3 (ref 0.7–3.1)
LYMPHOCYTES NFR BLD AUTO: 10.3 % (ref 19.6–45.3)
LYMPHOCYTES NFR BLD AUTO: 7.5 % (ref 19.6–45.3)
M PNEUMO IGG SER IA-ACNC: NOT DETECTED
MAGNESIUM SERPL-MCNC: 1.9 MG/DL (ref 1.6–2.4)
MAXIMAL PREDICTED HEART RATE: 147 BPM
MCH RBC QN AUTO: 29.4 PG (ref 26.6–33)
MCH RBC QN AUTO: 29.5 PG (ref 26.6–33)
MCHC RBC AUTO-ENTMCNC: 31.5 G/DL (ref 31.5–35.7)
MCHC RBC AUTO-ENTMCNC: 32.3 G/DL (ref 31.5–35.7)
MCV RBC AUTO: 91.2 FL (ref 79–97)
MCV RBC AUTO: 93.4 FL (ref 79–97)
MODALITY: ABNORMAL
MODALITY: ABNORMAL
MONOCYTES # BLD AUTO: 1.1 10*3/MM3 (ref 0.1–0.9)
MONOCYTES # BLD AUTO: 1.2 10*3/MM3 (ref 0.1–0.9)
MONOCYTES NFR BLD AUTO: 5.9 % (ref 5–12)
MONOCYTES NFR BLD AUTO: 5.9 % (ref 5–12)
MRSA DNA SPEC QL NAA+PROBE: NORMAL
NEUTROPHILS NFR BLD AUTO: 15.2 10*3/MM3 (ref 1.7–7)
NEUTROPHILS NFR BLD AUTO: 18.2 10*3/MM3 (ref 1.7–7)
NEUTROPHILS NFR BLD AUTO: 83.5 % (ref 42.7–76)
NEUTROPHILS NFR BLD AUTO: 86.2 % (ref 42.7–76)
NITRITE UR QL STRIP: NEGATIVE
NRBC BLD AUTO-RTO: 0 /100 WBC (ref 0–0.2)
NRBC BLD AUTO-RTO: 0 /100 WBC (ref 0–0.2)
NT-PROBNP SERPL-MCNC: 6135 PG/ML (ref 0–900)
PCO2 BLDA: 32.5 MM HG (ref 35–48)
PCO2 BLDV: 44.8 MM HG (ref 42–51)
PEEP RESPIRATORY: 8 CM[H2O]
PH BLDA: 7.43 PH UNITS (ref 7.35–7.45)
PH BLDV: 7.37 PH UNITS (ref 7.32–7.43)
PH UR STRIP.AUTO: <=5 [PH] (ref 5–8)
PHOSPHATE SERPL-MCNC: 2.4 MG/DL (ref 2.5–4.5)
PLATELET # BLD AUTO: 278 10*3/MM3 (ref 140–450)
PLATELET # BLD AUTO: 280 10*3/MM3 (ref 140–450)
PMV BLD AUTO: 9.6 FL (ref 6–12)
PMV BLD AUTO: 9.8 FL (ref 6–12)
PO2 BLDA: 56.6 MM HG (ref 83–108)
PO2 BLDV: 32 MM HG (ref 40–42)
POTASSIUM SERPL-SCNC: 3.5 MMOL/L (ref 3.5–5.2)
POTASSIUM SERPL-SCNC: 3.8 MMOL/L (ref 3.5–5.2)
PROCALCITONIN SERPL-MCNC: 5.11 NG/ML (ref 0–0.25)
PROT SERPL-MCNC: 6.3 G/DL (ref 6–8.5)
PROT SERPL-MCNC: 6.4 G/DL (ref 6–8.5)
PROT UR QL STRIP: ABNORMAL
PROTHROMBIN TIME: 11 SECONDS (ref 9.6–11.7)
QT INTERVAL: 460 MS
RBC # BLD AUTO: 3.5 10*6/MM3 (ref 4.14–5.8)
RBC # BLD AUTO: 3.76 10*6/MM3 (ref 4.14–5.8)
RBC # UR STRIP: ABNORMAL /HPF
REF LAB TEST METHOD: ABNORMAL
RHINOVIRUS RNA SPEC NAA+PROBE: NOT DETECTED
RSV RNA NPH QL NAA+NON-PROBE: NOT DETECTED
SAO2 % BLDCOA: 90.1 % (ref 94–98)
SAO2 % BLDCOV: 59 % (ref 45–75)
SARS-COV-2 RNA NPH QL NAA+NON-PROBE: NOT DETECTED
SODIUM SERPL-SCNC: 142 MMOL/L (ref 136–145)
SODIUM SERPL-SCNC: 143 MMOL/L (ref 136–145)
SP GR UR STRIP: 1.02 (ref 1–1.03)
SQUAMOUS #/AREA URNS HPF: ABNORMAL /HPF
STRESS TARGET HR: 125 BPM
TOTAL RATE: 37 BREATHS/MINUTE
TROPONIN T SERPL-MCNC: 0.96 NG/ML (ref 0–0.03)
TROPONIN T SERPL-MCNC: 1.43 NG/ML (ref 0–0.03)
TROPONIN T SERPL-MCNC: 3.89 NG/ML (ref 0–0.03)
TROPONIN T SERPL-MCNC: 4.28 NG/ML (ref 0–0.03)
TSH SERPL DL<=0.05 MIU/L-ACNC: 0.86 UIU/ML (ref 0.27–4.2)
UROBILINOGEN UR QL STRIP: ABNORMAL
WBC # UR STRIP: ABNORMAL /HPF
WBC CASTS #/AREA URNS LPF: ABNORMAL /LPF
WBC NRBC COR # BLD: 18.2 10*3/MM3 (ref 3.4–10.8)
WBC NRBC COR # BLD: 21.1 10*3/MM3 (ref 3.4–10.8)
WHOLE BLOOD HOLD COAG: NORMAL
WHOLE BLOOD HOLD SPECIMEN: NORMAL

## 2022-01-01 PROCEDURE — C1894 INTRO/SHEATH, NON-LASER: HCPCS | Performed by: INTERNAL MEDICINE

## 2022-01-01 PROCEDURE — 25010000002 SULFUR HEXAFLUORIDE MICROSPH 60.7-25 MG RECONSTITUTED SUSPENSION: Performed by: INTERNAL MEDICINE

## 2022-01-01 PROCEDURE — 83880 ASSAY OF NATRIURETIC PEPTIDE: CPT | Performed by: EMERGENCY MEDICINE

## 2022-01-01 PROCEDURE — 25010000002 PHENYLEPHRINE 10 MG/ML SOLUTION: Performed by: INTERNAL MEDICINE

## 2022-01-01 PROCEDURE — 4A023N7 MEASUREMENT OF CARDIAC SAMPLING AND PRESSURE, LEFT HEART, PERCUTANEOUS APPROACH: ICD-10-PCS | Performed by: INTERNAL MEDICINE

## 2022-01-01 PROCEDURE — 25010000002 FUROSEMIDE PER 20 MG: Performed by: NURSE PRACTITIONER

## 2022-01-01 PROCEDURE — 93005 ELECTROCARDIOGRAM TRACING: CPT | Performed by: EMERGENCY MEDICINE

## 2022-01-01 PROCEDURE — 94799 UNLISTED PULMONARY SVC/PX: CPT

## 2022-01-01 PROCEDURE — 76705 ECHO EXAM OF ABDOMEN: CPT

## 2022-01-01 PROCEDURE — 99292 CRITICAL CARE ADDL 30 MIN: CPT

## 2022-01-01 PROCEDURE — 87102 FUNGUS ISOLATION CULTURE: CPT | Performed by: INTERNAL MEDICINE

## 2022-01-01 PROCEDURE — 25010000002 FENTANYL CITRATE (PF) 50 MCG/ML SOLUTION

## 2022-01-01 PROCEDURE — 84100 ASSAY OF PHOSPHORUS: CPT | Performed by: NURSE PRACTITIONER

## 2022-01-01 PROCEDURE — 25010000002 MIDAZOLAM PER 1 MG

## 2022-01-01 PROCEDURE — 84484 ASSAY OF TROPONIN QUANT: CPT | Performed by: NURSE PRACTITIONER

## 2022-01-01 PROCEDURE — C1725 CATH, TRANSLUMIN NON-LASER: HCPCS | Performed by: INTERNAL MEDICINE

## 2022-01-01 PROCEDURE — 85025 COMPLETE CBC W/AUTO DIFF WBC: CPT | Performed by: EMERGENCY MEDICINE

## 2022-01-01 PROCEDURE — 82962 GLUCOSE BLOOD TEST: CPT

## 2022-01-01 PROCEDURE — B2111ZZ FLUOROSCOPY OF MULTIPLE CORONARY ARTERIES USING LOW OSMOLAR CONTRAST: ICD-10-PCS | Performed by: INTERNAL MEDICINE

## 2022-01-01 PROCEDURE — 25010000002 ONDANSETRON PER 1 MG: Performed by: NURSE PRACTITIONER

## 2022-01-01 PROCEDURE — 25010000002 PHENYLEPHRINE 10 MG/ML SOLUTION 5 ML VIAL: Performed by: INTERNAL MEDICINE

## 2022-01-01 PROCEDURE — 85730 THROMBOPLASTIN TIME PARTIAL: CPT | Performed by: EMERGENCY MEDICINE

## 2022-01-01 PROCEDURE — 92920 PRQ TRLUML C ANGIOP 1ART&/BR: CPT | Performed by: INTERNAL MEDICINE

## 2022-01-01 PROCEDURE — 93010 ELECTROCARDIOGRAM REPORT: CPT | Performed by: INTERNAL MEDICINE

## 2022-01-01 PROCEDURE — 84484 ASSAY OF TROPONIN QUANT: CPT | Performed by: EMERGENCY MEDICINE

## 2022-01-01 PROCEDURE — 85379 FIBRIN DEGRADATION QUANT: CPT | Performed by: EMERGENCY MEDICINE

## 2022-01-01 PROCEDURE — 83605 ASSAY OF LACTIC ACID: CPT

## 2022-01-01 PROCEDURE — 0 MILRINONE LACTATE IN DEXTROSE 20-5 MG/100ML-% SOLUTION: Performed by: NURSE PRACTITIONER

## 2022-01-01 PROCEDURE — 99223 1ST HOSP IP/OBS HIGH 75: CPT | Performed by: INTERNAL MEDICINE

## 2022-01-01 PROCEDURE — 93306 TTE W/DOPPLER COMPLETE: CPT

## 2022-01-01 PROCEDURE — 71275 CT ANGIOGRAPHY CHEST: CPT

## 2022-01-01 PROCEDURE — 02703ZZ DILATION OF CORONARY ARTERY, ONE ARTERY, PERCUTANEOUS APPROACH: ICD-10-PCS | Performed by: INTERNAL MEDICINE

## 2022-01-01 PROCEDURE — B2151ZZ FLUOROSCOPY OF LEFT HEART USING LOW OSMOLAR CONTRAST: ICD-10-PCS | Performed by: INTERNAL MEDICINE

## 2022-01-01 PROCEDURE — 92950 HEART/LUNG RESUSCITATION CPR: CPT | Performed by: INTERNAL MEDICINE

## 2022-01-01 PROCEDURE — 80053 COMPREHEN METABOLIC PANEL: CPT | Performed by: NURSE PRACTITIONER

## 2022-01-01 PROCEDURE — 82803 BLOOD GASES ANY COMBINATION: CPT

## 2022-01-01 PROCEDURE — 71045 X-RAY EXAM CHEST 1 VIEW: CPT

## 2022-01-01 PROCEDURE — 84145 PROCALCITONIN (PCT): CPT | Performed by: EMERGENCY MEDICINE

## 2022-01-01 PROCEDURE — 25010000002 PIPERACILLIN SOD-TAZOBACTAM PER 1 G: Performed by: EMERGENCY MEDICINE

## 2022-01-01 PROCEDURE — 02HV33Z INSERTION OF INFUSION DEVICE INTO SUPERIOR VENA CAVA, PERCUTANEOUS APPROACH: ICD-10-PCS | Performed by: EMERGENCY MEDICINE

## 2022-01-01 PROCEDURE — C1887 CATHETER, GUIDING: HCPCS | Performed by: INTERNAL MEDICINE

## 2022-01-01 PROCEDURE — P9047 ALBUMIN (HUMAN), 25%, 50ML: HCPCS | Performed by: NURSE PRACTITIONER

## 2022-01-01 PROCEDURE — 85025 COMPLETE CBC W/AUTO DIFF WBC: CPT | Performed by: NURSE PRACTITIONER

## 2022-01-01 PROCEDURE — 84484 ASSAY OF TROPONIN QUANT: CPT | Performed by: INTERNAL MEDICINE

## 2022-01-01 PROCEDURE — 25010000002 PHENYLEPHRINE 10 MG/ML SOLUTION 5 ML VIAL: Performed by: NURSE PRACTITIONER

## 2022-01-01 PROCEDURE — 25010000002 CALCIUM GLUCONATE 2-0.675 GM/100ML-% SOLUTION: Performed by: NURSE PRACTITIONER

## 2022-01-01 PROCEDURE — C1751 CATH, INF, PER/CENT/MIDLINE: HCPCS

## 2022-01-01 PROCEDURE — 93306 TTE W/DOPPLER COMPLETE: CPT | Performed by: INTERNAL MEDICINE

## 2022-01-01 PROCEDURE — 25010000002 ALBUMIN HUMAN 25% PER 50 ML: Performed by: NURSE PRACTITIONER

## 2022-01-01 PROCEDURE — 25010000002 CEFEPIME PER 500 MG: Performed by: NURSE PRACTITIONER

## 2022-01-01 PROCEDURE — 85730 THROMBOPLASTIN TIME PARTIAL: CPT | Performed by: INTERNAL MEDICINE

## 2022-01-01 PROCEDURE — 25010000002 FENTANYL CITRATE (PF) 100 MCG/2ML SOLUTION: Performed by: INTERNAL MEDICINE

## 2022-01-01 PROCEDURE — 0 IOPAMIDOL PER 1 ML: Performed by: EMERGENCY MEDICINE

## 2022-01-01 PROCEDURE — 25010000002 MAGNESIUM SULFATE IN D5W 1G/100ML (PREMIX) 1-5 GM/100ML-% SOLUTION: Performed by: INTERNAL MEDICINE

## 2022-01-01 PROCEDURE — 86140 C-REACTIVE PROTEIN: CPT | Performed by: NURSE PRACTITIONER

## 2022-01-01 PROCEDURE — 5A12012 PERFORMANCE OF CARDIAC OUTPUT, SINGLE, MANUAL: ICD-10-PCS | Performed by: INTERNAL MEDICINE

## 2022-01-01 PROCEDURE — 85347 COAGULATION TIME ACTIVATED: CPT

## 2022-01-01 PROCEDURE — 25010000002 VANCOMYCIN 10 G RECONSTITUTED SOLUTION: Performed by: EMERGENCY MEDICINE

## 2022-01-01 PROCEDURE — 80053 COMPREHEN METABOLIC PANEL: CPT | Performed by: EMERGENCY MEDICINE

## 2022-01-01 PROCEDURE — 04HY32Z INSERTION OF MONITORING DEVICE INTO LOWER ARTERY, PERCUTANEOUS APPROACH: ICD-10-PCS | Performed by: INTERNAL MEDICINE

## 2022-01-01 PROCEDURE — 99291 CRITICAL CARE FIRST HOUR: CPT

## 2022-01-01 PROCEDURE — 0 IOPAMIDOL PER 1 ML: Performed by: INTERNAL MEDICINE

## 2022-01-01 PROCEDURE — 93458 L HRT ARTERY/VENTRICLE ANGIO: CPT | Performed by: INTERNAL MEDICINE

## 2022-01-01 PROCEDURE — 0BH17EZ INSERTION OF ENDOTRACHEAL AIRWAY INTO TRACHEA, VIA NATURAL OR ARTIFICIAL OPENING: ICD-10-PCS | Performed by: INTERNAL MEDICINE

## 2022-01-01 PROCEDURE — 25010000002 MIDAZOLAM PER 1 MG: Performed by: INTERNAL MEDICINE

## 2022-01-01 PROCEDURE — 25010000002 HEPARIN (PORCINE) 25000-0.45 UT/250ML-% SOLUTION: Performed by: EMERGENCY MEDICINE

## 2022-01-01 PROCEDURE — C1769 GUIDE WIRE: HCPCS | Performed by: INTERNAL MEDICINE

## 2022-01-01 PROCEDURE — 93005 ELECTROCARDIOGRAM TRACING: CPT

## 2022-01-01 PROCEDURE — 94660 CPAP INITIATION&MGMT: CPT

## 2022-01-01 PROCEDURE — 99233 SBSQ HOSP IP/OBS HIGH 50: CPT | Performed by: INTERNAL MEDICINE

## 2022-01-01 PROCEDURE — 83735 ASSAY OF MAGNESIUM: CPT | Performed by: NURSE PRACTITIONER

## 2022-01-01 PROCEDURE — 0202U NFCT DS 22 TRGT SARS-COV-2: CPT | Performed by: EMERGENCY MEDICINE

## 2022-01-01 PROCEDURE — 93005 ELECTROCARDIOGRAM TRACING: CPT | Performed by: NURSE PRACTITIONER

## 2022-01-01 PROCEDURE — 84443 ASSAY THYROID STIM HORMONE: CPT | Performed by: EMERGENCY MEDICINE

## 2022-01-01 PROCEDURE — 87040 BLOOD CULTURE FOR BACTERIA: CPT | Performed by: EMERGENCY MEDICINE

## 2022-01-01 PROCEDURE — 31500 INSERT EMERGENCY AIRWAY: CPT | Performed by: NURSE PRACTITIONER

## 2022-01-01 PROCEDURE — 87641 MR-STAPH DNA AMP PROBE: CPT | Performed by: NURSE PRACTITIONER

## 2022-01-01 PROCEDURE — 92950 HEART/LUNG RESUSCITATION CPR: CPT

## 2022-01-01 PROCEDURE — 36600 WITHDRAWAL OF ARTERIAL BLOOD: CPT

## 2022-01-01 PROCEDURE — 25010000002 HEPARIN (PORCINE) PER 1000 UNITS: Performed by: INTERNAL MEDICINE

## 2022-01-01 PROCEDURE — 25010000002 EPINEPHRINE 1 MG/ML SOLUTION: Performed by: NURSE PRACTITIONER

## 2022-01-01 PROCEDURE — 81001 URINALYSIS AUTO W/SCOPE: CPT | Performed by: EMERGENCY MEDICINE

## 2022-01-01 PROCEDURE — 85610 PROTHROMBIN TIME: CPT | Performed by: EMERGENCY MEDICINE

## 2022-01-01 PROCEDURE — 25010000002 EPINEPHRINE 1 MG/10ML SOLUTION PREFILLED SYRINGE: Performed by: INTERNAL MEDICINE

## 2022-01-01 RX ORDER — ALBUMIN (HUMAN) 12.5 G/50ML
25 SOLUTION INTRAVENOUS ONCE
Status: DISCONTINUED | OUTPATIENT
Start: 2022-01-01 | End: 2022-01-01 | Stop reason: HOSPADM

## 2022-01-01 RX ORDER — ONDANSETRON 2 MG/ML
4 INJECTION INTRAMUSCULAR; INTRAVENOUS EVERY 6 HOURS PRN
Status: DISCONTINUED | OUTPATIENT
Start: 2022-01-01 | End: 2022-01-01 | Stop reason: HOSPADM

## 2022-01-01 RX ORDER — SODIUM CHLORIDE 0.9 % (FLUSH) 0.9 %
10 SYRINGE (ML) INJECTION AS NEEDED
Status: DISCONTINUED | OUTPATIENT
Start: 2022-01-01 | End: 2022-01-01 | Stop reason: HOSPADM

## 2022-01-01 RX ORDER — NOREPINEPHRINE BIT/0.9 % NACL 8 MG/250ML
.02-.3 INFUSION BOTTLE (ML) INTRAVENOUS
Status: DISCONTINUED | OUTPATIENT
Start: 2022-01-01 | End: 2022-01-01

## 2022-01-01 RX ORDER — FENTANYL CITRATE 50 UG/ML
INJECTION, SOLUTION INTRAMUSCULAR; INTRAVENOUS
Status: DISCONTINUED | OUTPATIENT
Start: 2022-01-01 | End: 2022-01-01 | Stop reason: HOSPADM

## 2022-01-01 RX ORDER — PHENYLEPHRINE HYDROCHLORIDE 10 MG/ML
INJECTION INTRAVENOUS
Status: DISCONTINUED | OUTPATIENT
Start: 2022-01-01 | End: 2022-01-01 | Stop reason: HOSPADM

## 2022-01-01 RX ORDER — FENTANYL CITRATE 50 UG/ML
100 INJECTION, SOLUTION INTRAMUSCULAR; INTRAVENOUS
Status: DISCONTINUED | OUTPATIENT
Start: 2022-01-01 | End: 2022-01-01 | Stop reason: HOSPADM

## 2022-01-01 RX ORDER — CALCIUM GLUCONATE 20 MG/ML
2 INJECTION, SOLUTION INTRAVENOUS ONCE
Status: COMPLETED | OUTPATIENT
Start: 2022-01-01 | End: 2022-01-01

## 2022-01-01 RX ORDER — MILRINONE LACTATE 0.2 MG/ML
.25-.75 INJECTION, SOLUTION INTRAVENOUS
Status: DISCONTINUED | OUTPATIENT
Start: 2022-01-01 | End: 2022-01-01

## 2022-01-01 RX ORDER — CHOLECALCIFEROL (VITAMIN D3) 125 MCG
1 CAPSULE ORAL DAILY
COMMUNITY

## 2022-01-01 RX ORDER — DEXLANSOPRAZOLE 30 MG/1
30 CAPSULE, DELAYED RELEASE ORAL DAILY
COMMUNITY

## 2022-01-01 RX ORDER — MIDAZOLAM HYDROCHLORIDE 1 MG/ML
2 INJECTION INTRAMUSCULAR; INTRAVENOUS ONCE
Status: COMPLETED | OUTPATIENT
Start: 2022-01-01 | End: 2022-01-01

## 2022-01-01 RX ORDER — ASPIRIN 325 MG
325 TABLET, DELAYED RELEASE (ENTERIC COATED) ORAL DAILY
Status: DISCONTINUED | OUTPATIENT
Start: 2022-01-01 | End: 2022-01-01 | Stop reason: HOSPADM

## 2022-01-01 RX ORDER — HEPARIN SODIUM 1000 [USP'U]/ML
INJECTION, SOLUTION INTRAVENOUS; SUBCUTANEOUS
Status: DISCONTINUED | OUTPATIENT
Start: 2022-01-01 | End: 2022-01-01 | Stop reason: HOSPADM

## 2022-01-01 RX ORDER — EPINEPHRINE 0.1 MG/ML
SYRINGE (ML) INJECTION
Status: DISCONTINUED | OUTPATIENT
Start: 2022-01-01 | End: 2022-01-01 | Stop reason: HOSPADM

## 2022-01-01 RX ORDER — MAGNESIUM SULFATE 1 G/100ML
1 INJECTION INTRAVENOUS ONCE
Status: COMPLETED | OUTPATIENT
Start: 2022-01-01 | End: 2022-01-01

## 2022-01-01 RX ORDER — ACETAMINOPHEN 325 MG/1
650 TABLET ORAL EVERY 4 HOURS PRN
Status: DISCONTINUED | OUTPATIENT
Start: 2022-01-01 | End: 2022-01-01 | Stop reason: HOSPADM

## 2022-01-01 RX ORDER — CARVEDILOL 12.5 MG/1
12.5 TABLET ORAL 2 TIMES DAILY WITH MEALS
COMMUNITY

## 2022-01-01 RX ORDER — CHLORHEXIDINE GLUCONATE 0.12 MG/ML
15 RINSE ORAL EVERY 12 HOURS SCHEDULED
Status: DISCONTINUED | OUTPATIENT
Start: 2022-01-01 | End: 2022-01-01 | Stop reason: HOSPADM

## 2022-01-01 RX ORDER — HEPARIN SODIUM 10000 [USP'U]/100ML
11 INJECTION, SOLUTION INTRAVENOUS
Status: DISCONTINUED | OUTPATIENT
Start: 2022-01-01 | End: 2022-01-01 | Stop reason: HOSPADM

## 2022-01-01 RX ORDER — PANTOPRAZOLE SODIUM 40 MG/10ML
40 INJECTION, POWDER, LYOPHILIZED, FOR SOLUTION INTRAVENOUS
Status: DISCONTINUED | OUTPATIENT
Start: 2022-01-01 | End: 2022-01-01

## 2022-01-01 RX ORDER — ASCORBIC ACID 500 MG
500 TABLET ORAL 2 TIMES DAILY
COMMUNITY

## 2022-01-01 RX ORDER — VANCOMYCIN 1.75 GRAM/500 ML IN 0.9 % SODIUM CHLORIDE INTRAVENOUS
20 ONCE
Status: COMPLETED | OUTPATIENT
Start: 2022-01-01 | End: 2022-01-01

## 2022-01-01 RX ORDER — ONDANSETRON 4 MG/1
4 TABLET, FILM COATED ORAL EVERY 6 HOURS PRN
Status: DISCONTINUED | OUTPATIENT
Start: 2022-01-01 | End: 2022-01-01 | Stop reason: HOSPADM

## 2022-01-01 RX ORDER — SODIUM CHLORIDE 9 MG/ML
250 INJECTION, SOLUTION INTRAVENOUS ONCE AS NEEDED
Status: CANCELLED | OUTPATIENT
Start: 2022-01-01

## 2022-01-01 RX ORDER — FENOFIBRATE 48 MG/1
48 TABLET, COATED ORAL EVERY MORNING
COMMUNITY

## 2022-01-01 RX ORDER — SODIUM CHLORIDE 0.9 % (FLUSH) 0.9 %
10 SYRINGE (ML) INJECTION EVERY 12 HOURS SCHEDULED
Status: DISCONTINUED | OUTPATIENT
Start: 2022-01-01 | End: 2022-01-01 | Stop reason: HOSPADM

## 2022-01-01 RX ORDER — DEXTROMETHORPHAN HYDROBROMIDE AND GUAIFENESIN 20; 400 MG/20ML; MG/20ML
10 SOLUTION ORAL 2 TIMES DAILY PRN
COMMUNITY

## 2022-01-01 RX ORDER — FUROSEMIDE 40 MG/1
40 TABLET ORAL 2 TIMES DAILY
COMMUNITY
Start: 2022-01-01 | End: 2022-10-30

## 2022-01-01 RX ORDER — ETOMIDATE 2 MG/ML
INJECTION INTRAVENOUS
Status: COMPLETED
Start: 2022-01-01 | End: 2022-01-01

## 2022-01-01 RX ORDER — DOCUSATE SODIUM 100 MG/1
100 CAPSULE, LIQUID FILLED ORAL 2 TIMES DAILY
COMMUNITY

## 2022-01-01 RX ORDER — LORATADINE 10 MG/1
10 TABLET ORAL DAILY
COMMUNITY

## 2022-01-01 RX ORDER — FUROSEMIDE 10 MG/ML
40 INJECTION INTRAMUSCULAR; INTRAVENOUS ONCE
Status: COMPLETED | OUTPATIENT
Start: 2022-01-01 | End: 2022-01-01

## 2022-01-01 RX ORDER — ALBUMIN (HUMAN) 12.5 G/50ML
25 SOLUTION INTRAVENOUS ONCE
Status: COMPLETED | OUTPATIENT
Start: 2022-01-01 | End: 2022-01-01

## 2022-01-01 RX ORDER — ETOMIDATE 2 MG/ML
20 INJECTION INTRAVENOUS ONCE
Status: COMPLETED | OUTPATIENT
Start: 2022-01-01 | End: 2022-01-01

## 2022-01-01 RX ORDER — MIDAZOLAM HYDROCHLORIDE 1 MG/ML
INJECTION INTRAMUSCULAR; INTRAVENOUS
Status: DISCONTINUED | OUTPATIENT
Start: 2022-01-01 | End: 2022-01-01 | Stop reason: HOSPADM

## 2022-01-01 RX ORDER — PANTOPRAZOLE SODIUM 40 MG/10ML
40 INJECTION, POWDER, LYOPHILIZED, FOR SOLUTION INTRAVENOUS
Status: DISCONTINUED | OUTPATIENT
Start: 2022-01-01 | End: 2022-01-01 | Stop reason: HOSPADM

## 2022-01-01 RX ORDER — ATORVASTATIN CALCIUM 40 MG/1
80 TABLET, FILM COATED ORAL NIGHTLY
Status: DISCONTINUED | OUTPATIENT
Start: 2022-01-01 | End: 2022-01-01 | Stop reason: HOSPADM

## 2022-01-01 RX ORDER — FENTANYL CITRATE 50 UG/ML
INJECTION, SOLUTION INTRAMUSCULAR; INTRAVENOUS
Status: COMPLETED
Start: 2022-01-01 | End: 2022-01-01

## 2022-01-01 RX ORDER — ATORVASTATIN CALCIUM 80 MG/1
80 TABLET, FILM COATED ORAL NIGHTLY
COMMUNITY

## 2022-01-01 RX ORDER — LIDOCAINE HYDROCHLORIDE 20 MG/ML
INJECTION, SOLUTION INFILTRATION; PERINEURAL
Status: DISCONTINUED | OUTPATIENT
Start: 2022-01-01 | End: 2022-01-01 | Stop reason: HOSPADM

## 2022-01-01 RX ORDER — MILRINONE LACTATE 0.2 MG/ML
0.5 INJECTION, SOLUTION INTRAVENOUS CONTINUOUS
Status: DISCONTINUED | OUTPATIENT
Start: 2022-01-01 | End: 2022-01-01 | Stop reason: HOSPADM

## 2022-01-01 RX ORDER — FUROSEMIDE 40 MG/1
40 TABLET ORAL EVERY MORNING
COMMUNITY

## 2022-01-01 RX ORDER — PAROXETINE 10 MG/1
10 TABLET, FILM COATED ORAL EVERY MORNING
COMMUNITY

## 2022-01-01 RX ORDER — MIDAZOLAM HYDROCHLORIDE 1 MG/ML
INJECTION INTRAMUSCULAR; INTRAVENOUS
Status: COMPLETED
Start: 2022-01-01 | End: 2022-01-01

## 2022-01-01 RX ORDER — ACETAMINOPHEN 650 MG/1
650 SUPPOSITORY RECTAL EVERY 4 HOURS PRN
Status: DISCONTINUED | OUTPATIENT
Start: 2022-01-01 | End: 2022-01-01 | Stop reason: HOSPADM

## 2022-01-01 RX ORDER — FUROSEMIDE 10 MG/ML
20 INJECTION INTRAMUSCULAR; INTRAVENOUS EVERY 6 HOURS
Status: DISCONTINUED | OUTPATIENT
Start: 2022-01-01 | End: 2022-01-01

## 2022-01-01 RX ORDER — CARBOXYMETHYLCELLULOSE SODIUM 5 MG/ML
2 SOLUTION/ DROPS OPHTHALMIC 2 TIMES DAILY PRN
COMMUNITY

## 2022-01-01 RX ADMIN — Medication 0.02 MCG/KG/MIN: at 12:53

## 2022-01-01 RX ADMIN — CALCIUM GLUCONATE 2 G: 20 INJECTION, SOLUTION INTRAVENOUS at 10:21

## 2022-01-01 RX ADMIN — Medication 10 ML: at 08:09

## 2022-01-01 RX ADMIN — MILRINONE LACTATE IN DEXTROSE 0.5 MCG/KG/MIN: 200 INJECTION, SOLUTION INTRAVENOUS at 12:24

## 2022-01-01 RX ADMIN — HEPARIN SODIUM 11 UNITS/KG/HR: 10000 INJECTION, SOLUTION INTRAVENOUS at 04:56

## 2022-01-01 RX ADMIN — PANTOPRAZOLE SODIUM 40 MG: 40 INJECTION, POWDER, FOR SOLUTION INTRAVENOUS at 12:18

## 2022-01-01 RX ADMIN — CEFEPIME 2 G: 2 INJECTION, POWDER, FOR SOLUTION INTRAVENOUS at 20:04

## 2022-01-01 RX ADMIN — ASPIRIN 325 MG: 325 TABLET, COATED ORAL at 16:33

## 2022-01-01 RX ADMIN — IOPAMIDOL 100 ML: 755 INJECTION, SOLUTION INTRAVENOUS at 02:34

## 2022-01-01 RX ADMIN — FUROSEMIDE 20 MG: 10 INJECTION, SOLUTION INTRAMUSCULAR; INTRAVENOUS at 20:05

## 2022-01-01 RX ADMIN — POTASSIUM PHOSPHATE, MONOBASIC AND POTASSIUM PHOSPHATE, DIBASIC 20 MMOL: 224; 236 INJECTION, SOLUTION, CONCENTRATE INTRAVENOUS at 10:21

## 2022-01-01 RX ADMIN — PHENYLEPHRINE HYDROCHLORIDE 0.5 MCG/KG/MIN: 10 INJECTION INTRAVENOUS at 08:56

## 2022-01-01 RX ADMIN — SODIUM CHLORIDE 500 ML: 9 INJECTION, SOLUTION INTRAVENOUS at 00:45

## 2022-01-01 RX ADMIN — Medication 0.05 MCG/KG/MIN: at 01:08

## 2022-01-01 RX ADMIN — ETOMIDATE INJECTION 20 MG: 2 SOLUTION INTRAVENOUS at 11:27

## 2022-01-01 RX ADMIN — MAGNESIUM SULFATE IN DEXTROSE 1 G: 10 INJECTION, SOLUTION INTRAVENOUS at 10:22

## 2022-01-01 RX ADMIN — MILRINONE LACTATE 0.75 MCG/KG/MIN: 0.2 INJECTION, SOLUTION INTRAVENOUS at 04:55

## 2022-01-01 RX ADMIN — MILRINONE LACTATE 0.25 MCG/KG/MIN: 0.2 INJECTION, SOLUTION INTRAVENOUS at 20:04

## 2022-01-01 RX ADMIN — FENTANYL CITRATE 100 MCG: 50 INJECTION, SOLUTION INTRAMUSCULAR; INTRAVENOUS at 11:35

## 2022-01-01 RX ADMIN — FUROSEMIDE 40 MG: 10 INJECTION, SOLUTION INTRAMUSCULAR; INTRAVENOUS at 10:22

## 2022-01-01 RX ADMIN — MIDAZOLAM HYDROCHLORIDE 2 MG: 1 INJECTION, SOLUTION INTRAMUSCULAR; INTRAVENOUS at 11:32

## 2022-01-01 RX ADMIN — ATORVASTATIN CALCIUM 80 MG: 40 TABLET, FILM COATED ORAL at 20:05

## 2022-01-01 RX ADMIN — FUROSEMIDE 20 MG: 10 INJECTION, SOLUTION INTRAMUSCULAR; INTRAVENOUS at 02:01

## 2022-01-01 RX ADMIN — PANTOPRAZOLE SODIUM 40 MG: 40 INJECTION, POWDER, FOR SOLUTION INTRAVENOUS at 10:37

## 2022-01-01 RX ADMIN — MIDAZOLAM HYDROCHLORIDE 2 MG: 1 INJECTION INTRAMUSCULAR; INTRAVENOUS at 11:32

## 2022-01-01 RX ADMIN — VASOPRESSIN 0.03 UNITS/MIN: 0.2 INJECTION INTRAVENOUS at 12:17

## 2022-01-01 RX ADMIN — FUROSEMIDE 20 MG: 10 INJECTION, SOLUTION INTRAMUSCULAR; INTRAVENOUS at 07:58

## 2022-01-01 RX ADMIN — Medication 0.1 MCG/KG/MIN: at 04:56

## 2022-01-01 RX ADMIN — ETOMIDATE 20 MG: 2 INJECTION INTRAVENOUS at 11:27

## 2022-01-01 RX ADMIN — HEPARIN SODIUM 9.92 UNITS/KG/HR: 10000 INJECTION, SOLUTION INTRAVENOUS at 00:30

## 2022-01-01 RX ADMIN — CEFEPIME 2 G: 2 INJECTION, POWDER, FOR SOLUTION INTRAVENOUS at 07:29

## 2022-01-01 RX ADMIN — PIPERACILLIN AND TAZOBACTAM 3.38 G: 3; .375 INJECTION, POWDER, FOR SOLUTION INTRAVENOUS at 01:05

## 2022-01-01 RX ADMIN — CEFEPIME 2 G: 2 INJECTION, POWDER, FOR SOLUTION INTRAVENOUS at 07:58

## 2022-01-01 RX ADMIN — FUROSEMIDE 10 MG/HR: 10 INJECTION, SOLUTION INTRAMUSCULAR; INTRAVENOUS at 12:16

## 2022-01-01 RX ADMIN — SULFUR HEXAFLUORIDE 2 ML: KIT at 10:45

## 2022-01-01 RX ADMIN — Medication 10 ML: at 10:37

## 2022-01-01 RX ADMIN — NOREPINEPHRINE BITARTRATE 0.3 MCG/KG/MIN: 1 INJECTION, SOLUTION, CONCENTRATE INTRAVENOUS at 10:22

## 2022-01-01 RX ADMIN — ALBUMIN (HUMAN) 25 G: 0.25 INJECTION, SOLUTION INTRAVENOUS at 08:26

## 2022-01-01 RX ADMIN — ONDANSETRON 4 MG: 2 INJECTION INTRAMUSCULAR; INTRAVENOUS at 01:34

## 2022-01-01 RX ADMIN — VANCOMYCIN HYDROCHLORIDE 1750 MG: 10 INJECTION, POWDER, LYOPHILIZED, FOR SOLUTION INTRAVENOUS at 04:52

## 2022-01-01 RX ADMIN — Medication 10 ML: at 07:30

## 2022-01-01 RX ADMIN — Medication 10 ML: at 20:04

## 2022-10-23 PROBLEM — I10 ESSENTIAL (PRIMARY) HYPERTENSION: Status: ACTIVE | Noted: 2020-11-27

## 2022-10-23 PROBLEM — E87.6 HYPOKALEMIA: Status: ACTIVE | Noted: 2020-11-27

## 2022-10-23 PROBLEM — R41.0 DISORIENTATION, UNSPECIFIED: Status: ACTIVE | Noted: 2020-11-27

## 2022-10-23 PROBLEM — R53.1 WEAKNESS: Status: ACTIVE | Noted: 2020-11-27

## 2022-10-23 PROBLEM — I69.954 HEMIPLEGIA AND HEMIPARESIS FOLLOWING UNSPECIFIED CEREBROVASCULAR DISEASE AFFECTING LEFT NON-DOMINANT SIDE (HCC): Status: ACTIVE | Noted: 2020-11-27

## 2022-10-23 PROBLEM — E55.9 VITAMIN D DEFICIENCY, UNSPECIFIED: Status: ACTIVE | Noted: 2020-11-27

## 2022-10-23 PROBLEM — K59.00 CONSTIPATION, UNSPECIFIED: Status: ACTIVE | Noted: 2020-11-27

## 2022-10-23 PROBLEM — N40.0 BENIGN PROSTATIC HYPERPLASIA WITHOUT LOWER URINARY TRACT SYMPTOMS: Status: ACTIVE | Noted: 2020-11-27

## 2022-10-23 PROBLEM — L30.9 DERMATITIS, UNSPECIFIED: Status: ACTIVE | Noted: 2020-11-27

## 2022-10-23 PROBLEM — Z91.81 HISTORY OF FALLING: Status: ACTIVE | Noted: 2020-11-27

## 2022-10-23 PROBLEM — R53.81 OTHER MALAISE: Status: ACTIVE | Noted: 2020-11-27

## 2022-10-23 PROBLEM — J30.2 OTHER SEASONAL ALLERGIC RHINITIS: Status: ACTIVE | Noted: 2020-11-27

## 2022-10-23 PROBLEM — R29.6 REPEATED FALLS: Status: ACTIVE | Noted: 2020-11-27

## 2022-10-23 PROBLEM — J30.9 ALLERGIC RHINITIS, UNSPECIFIED: Status: ACTIVE | Noted: 2020-11-27

## 2022-10-23 PROBLEM — F25.0 SCHIZOAFFECTIVE DISORDER, BIPOLAR TYPE: Status: ACTIVE | Noted: 2020-12-03

## 2022-10-23 PROBLEM — R00.0 TACHYCARDIA, UNSPECIFIED: Status: ACTIVE | Noted: 2020-11-27

## 2022-10-23 PROBLEM — E78.2 MIXED HYPERLIPIDEMIA: Status: ACTIVE | Noted: 2020-11-27

## 2022-10-23 PROBLEM — F33.9 MAJOR DEPRESSIVE DISORDER, RECURRENT, UNSPECIFIED (HCC): Status: ACTIVE | Noted: 2020-12-03

## 2022-10-23 PROBLEM — G40.909 EPILEPSY, UNSPECIFIED, NOT INTRACTABLE, WITHOUT STATUS EPILEPTICUS (HCC): Status: ACTIVE | Noted: 2020-11-27

## 2022-10-23 PROBLEM — K21.9 GASTRO-ESOPHAGEAL REFLUX DISEASE WITHOUT ESOPHAGITIS: Status: ACTIVE | Noted: 2020-11-27

## 2022-10-24 PROBLEM — A41.9 SEPTIC SHOCK (HCC): Status: ACTIVE | Noted: 2022-01-01

## 2022-10-24 PROBLEM — R65.21 SEPTIC SHOCK (HCC): Status: ACTIVE | Noted: 2022-01-01

## 2022-10-24 PROBLEM — J96.01 ACUTE HYPOXEMIC RESPIRATORY FAILURE (HCC): Status: ACTIVE | Noted: 2022-01-01

## 2022-10-24 PROBLEM — I21.4 NSTEMI (NON-ST ELEVATED MYOCARDIAL INFARCTION): Status: ACTIVE | Noted: 2022-01-01

## 2022-10-25 LAB — QT INTERVAL: 375 MS

## 2022-10-28 LAB
BACTERIA ISLT: NORMAL
BACTERIA SPEC AEROBE CULT: NORMAL
BACTERIA SPEC AEROBE CULT: NORMAL

## (undated) DEVICE — CATH DIAG IMPULSE FL5 6F 100CM

## (undated) DEVICE — PINNACLE INTRODUCER SHEATH: Brand: PINNACLE

## (undated) DEVICE — GW PTFE EMERALD HEPCOAT FC J TIP STD .035 3MM 150CM

## (undated) DEVICE — CATH DIAG IMPULSE FL4 6F 100CM

## (undated) DEVICE — ST ACC MICROPUNCTURE STFF/CANN PLAT/TP 4F 21G 40CM

## (undated) DEVICE — HI-TORQUE BALANCE MIDDLEWEIGHT UNIVERSAL II GUIDE WIRE J TIP PAK 190 CM: Brand: HI-TORQUE BALANCE MIDDLEWEIGHT UNIVERSAL II

## (undated) DEVICE — BALN EUPHORA 2.5X12MM

## (undated) DEVICE — 6F .070 XB 3.5 100CM: Brand: VISTA BRITE TIP

## (undated) DEVICE — TRY INTRO PERC 6F

## (undated) DEVICE — PK TRY HEART CATH 50

## (undated) DEVICE — SWAN-GANZ TRUE SIZE THERMODILUTION "S" TIP: Brand: SWAN-GANZ TRUE SIZE

## (undated) DEVICE — CATH DIAG IMPULSE PIG .056 6F 110CM

## (undated) DEVICE — CATH DIAG IMPULSE FR4 6F 100CM

## (undated) DEVICE — ELECTRD DEFIB M/FUNC PROPADZ RADIOL 2PK